# Patient Record
Sex: MALE | Race: WHITE | NOT HISPANIC OR LATINO | Employment: OTHER | URBAN - METROPOLITAN AREA
[De-identification: names, ages, dates, MRNs, and addresses within clinical notes are randomized per-mention and may not be internally consistent; named-entity substitution may affect disease eponyms.]

---

## 2017-09-08 PROBLEM — K21.9 GASTROESOPHAGEAL REFLUX DISEASE WITHOUT ESOPHAGITIS: Status: ACTIVE | Noted: 2017-09-08

## 2017-09-08 PROBLEM — M81.0 AGE-RELATED OSTEOPOROSIS WITHOUT CURRENT PATHOLOGICAL FRACTURE: Status: ACTIVE | Noted: 2017-09-08

## 2017-09-08 PROBLEM — H04.129 DRY EYE: Status: ACTIVE | Noted: 2017-09-08

## 2017-09-08 PROBLEM — E78.00 PURE HYPERCHOLESTEROLEMIA: Status: ACTIVE | Noted: 2017-09-08

## 2017-10-06 PROBLEM — R03.0 WHITE COAT SYNDROME WITHOUT DIAGNOSIS OF HYPERTENSION: Status: ACTIVE | Noted: 2017-10-06

## 2018-10-18 ENCOUNTER — APPOINTMENT (OUTPATIENT)
Dept: RADIOLOGY | Facility: HOSPITAL | Age: 68
End: 2018-10-18
Attending: FAMILY MEDICINE
Payer: MEDICARE

## 2018-10-18 DIAGNOSIS — M81.0 OSTEOPOROSIS, UNSPECIFIED OSTEOPOROSIS TYPE, UNSPECIFIED PATHOLOGICAL FRACTURE PRESENCE: ICD-10-CM

## 2018-10-18 PROCEDURE — 77080 DXA BONE DENSITY AXIAL: CPT | Mod: TC

## 2018-10-18 PROCEDURE — 77080 DXA BONE DENSITY AXIAL: CPT | Mod: 26,,, | Performed by: RADIOLOGY

## 2018-11-07 ENCOUNTER — OFFICE VISIT (OUTPATIENT)
Dept: RHEUMATOLOGY | Facility: CLINIC | Age: 68
End: 2018-11-07
Payer: MEDICARE

## 2018-11-07 VITALS — HEART RATE: 59 BPM | BODY MASS INDEX: 21.08 KG/M2 | HEIGHT: 74 IN | WEIGHT: 164.25 LBS

## 2018-11-07 DIAGNOSIS — M81.0 OSTEOPOROSIS, UNSPECIFIED OSTEOPOROSIS TYPE, UNSPECIFIED PATHOLOGICAL FRACTURE PRESENCE: Primary | ICD-10-CM

## 2018-11-07 PROCEDURE — 99999 PR PBB SHADOW E&M-EST. PATIENT-LVL III: CPT | Mod: PBBFAC,,, | Performed by: INTERNAL MEDICINE

## 2018-11-07 PROCEDURE — 99204 OFFICE O/P NEW MOD 45 MIN: CPT | Mod: S$GLB,,, | Performed by: INTERNAL MEDICINE

## 2018-11-07 NOTE — PATIENT INSTRUCTIONS
Osteoporosis: Understanding Bone Loss     A balanced system keeps building and resorbing bone.   The body has a natural system for maintaining bone. Understanding this system can help you learn how to maintain your bones.  A balanced system supports the body  The body is always losing (resorbing) and making bone. This process is called remodeling. Bone-resorbing cells take bone apart. They do this so the minerals can be used to repair an injury or make new bone. Bone-making cells form new bone using calcium and other minerals. These minerals come from the food you eat. When this bone-making system is in balance, the same amount of bone is built and resorbed.        An unbalanced system cant give support     An unblalanced system builds too little bone and resorbs too much.   Changes in hormone levels, activity, medications, or diet can affect the bone-making system. When the system gets out of balance, the amount of bone lost is greater than the amount of bone made. This can cause osteopenia (when bone starts to become less dense). Left untreated, bone loss gets worse, leading to osteoporosis. Weak bones cant support the body. In fact, they can fracture just from the weight of your body. This often happens in vertebrae (bones of the spine). When vertebrae fracture, parts of the spine compress. This causes the back to bend or hump over, and it can also cause back pain.  Date Last Reviewed: 10/11/2015  © 3701-7438 [employer]. [address] [place] [address]. All rights reserved. This information is not intended as a substitute for professional medical care. Always follow your healthcare professional's instructions.        Preventing Osteoporosis: Avoiding Bone Loss  Certain factors can speed up bone loss or decrease bone growth. For example, alcohol, cigarettes, and certain medicines reduce bone mass. Some foods make it hard for your body to absorb calcium.    Things to avoid  Here are  things to avoid to help prevent osteoporosis:  · Alcohol is toxic to bones. It is a major cause of bone loss. Heavy drinking can cause osteoporosis even if you have no other risk factors.  · Smoking reduces bone mass. Smoking may also interfere with estrogen levels and cause early menopause.  · Inactivity makes your bones lose strength and become thinner. Over time, thin bones may break. Women who aren't active are at a high risk for osteoporosis.  · Certain medicines, such as cortisone, increase bone loss. They also decrease bone growth. Ask your healthcare provider about any side effects of your medicines, and how to prevent them.  · Protein-rich or salty foods eaten in large amounts may deplete calcium.  · Caffeine increases calcium loss. People who drink a lot of coffee, tea, or valorie lose more calcium than those who don't.  Date Last Reviewed: 10/17/2015  © 9796-2181 [employer]. [address] [place] [address]. All rights reserved. This information is not intended as a substitute for professional medical care. Always follow your healthcare professional's instructions.        Preventing Osteoporosis: Meeting Your Calcium Needs    Your body needs calcium to build and repair bones. But it can't make calcium on its own. That's why it's important to eat calcium-rich foods. Some foods are naturally rich in calcium. Others have calcium added (fortified). It's best to get calcium from the foods you eat. But if you can't get enough, you may want to take calcium supplements. To meet your daily calcium needs, try the foods listed below.  Dairy Fish & beans Other sources      Source   Calcium (mg) per serving   Source   Calcium (mg) per serving   Source   Calcium (mg) per serving      Low-fat yogurt, plain   415 mg/8 oz.   Sardines, Atlantic, canned, with bones   351 mg/3 oz.   Oatmeal, instant, fortified   215 mg/1 cup   Nonfat milk   302 mg/1 cup   Buckner, sockeye, canned, with bones   239 mg/3  oz.   Tofu made with calcium sulfate   204 mg/3 oz.   Low-fat milk   297 mg/1 cup   Soybeans, fresh, boiled   131 mg/1/2 cup   Collards   179 mg/1/2 cup   Swiss cheese   272 mg/1 oz.   White beans, cooked   81 mg/1/2 cup   English muffin, whole wheat   175 mg/1 muffin   Cheddar cheese   205 mg/1 oz.   Navy beans, cooked   79 mg/1/2 cup   Kale   90 mg/1/2 cup   Ice cream strawberry   79 mg/1/2 cup           Orange, navel   56 mg/1 medium   Note: Calcium levels may vary depending on brand and size.  Daily calcium needs  14-18 years old: 1,300 mg  19-30 years old: 1,000 mg  31-50 years old: 1,000 mg  51-70 years old, women: 1,200 mg  51-70 years old, men: 1,000 mg  Pregnant or nursin-28 years old: 1,300 mg, 19-50 years old: 1,000 mg  Older than 70 (women and men): 1,200 mg   Date Last Reviewed: 10/17/2015  © 0450-6373 [employer]. [address] [place] [address]. All rights reserved. This information is not intended as a substitute for professional medical care. Always follow your healthcare professional's instructions.        Living with Osteoporosis: Preventing Fractures  If you have osteoporosis, you can do a lot to reduce its effect on your life. Knowing how to prevent fractures and spinal curvature can help you live more comfortably and safely with this disease.    Reducing your risk for fractures  The most common fracture sites in people with osteoporosis are the wrist, spine, and hip. These fractures are often caused by accidents and falls. All fractures are painful and may limit what you can do. But hip fractures are very serious. They need surgery, and it can take months to recover. To reduce your risk for fractures:  · Get regular exercise. Try walking, swimming, or weight training.  · Eat foods that are rich in calcium, or take calcium supplements.  · Make your home safe to help avoid accidents.  · Take extra precautions not to fall in risky areas, such as icy  sidewalks.  Understanding spinal fractures  Your spine is made up of many bones called vertebrae. Osteoporosis can cause the vertebrae in your spine to collapse. As a result, your upper back may arch forward, creating a curvature. Spine fractures may also result from back strain and bad posture. You will also lose height. Your lower spine must then adjust to keep your body balanced. This can cause back pain. To prevent or lessen these spinal changes:  · Practice good posture.  · Use proper techniques if you need to lift heavy objects.  · Do back exercises to help your posture.  · Lie on your back when you have pain.  · Ask your healthcare provider about these and other ways to help your spine.  Date Last Reviewed: 10/17/2015  © 6484-8324 [employer]. [address] [place] [address], [place] [address]. All rights reserved. This information is not intended as a substitute for professional medical care. Always follow your healthcare professional's instructions.        Living with Osteoporosis: Regular Exercise  If you have osteoporosis, exercise is vital for your health. It can prevent bone fractures and spine changes. It will slow bone loss. Exercise will strengthen your body. It can also be fun. A variety of exercises is best. See below for exercises that can help you. Before you start, though, talk to your healthcare provider to be sure these exercises are right for you.    Resistance exercises. These build muscle strength and maintain bone mass. They also make you less prone to injury. Exercises include lifting small weights, doing push-ups and sit-ups, using elastic exercise bands, and using weight machines.  Weight-bearing activities. These help your whole body. They also help you maintain bone mass. Activities include walking, dancing, and housework.  Nonweight-bearing exercises. These help prevent back strain and pain. They do this by building the trunk and leg muscles. Exercises that help with flexibility  can prevent falls. Examples include swimming, water exercise, and stretching.  Staying safe  Here are tips to stay safe:   · Always check with your healthcare provider before starting any new exercise program.  · Use weights only as instructed.  · Stop any exercise that causes pain.   Date Last Reviewed: 10/17/2015  © 8951-4419 [employer]. [address] [place] [address], [place] [address]. All rights reserved. This information is not intended as a substitute for professional medical care. Always follow your healthcare professional's instructions.        Denosumab injection  What is this medicine?  DENOSUMAB (den oh ayleen mab) slows bone breakdown. Prolia is used to treat osteoporosis in women after menopause and in men. Xgeva is used to prevent bone fractures and other bone problems caused by cancer bone metastases. Xgeva is also used to treat giant cell tumor of the bone.  How should I use this medicine?  This medicine is for injection under the skin. It is given by a health care professional in a hospital or clinic setting.  If you are getting Prolia, a special MedGuide will be given to you by the pharmacist with each prescription and refill. Be sure to read this information carefully each time.  For Prolia, talk to your pediatrician regarding the use of this medicine in children. Special care may be needed. For Xgeva, talk to your pediatrician regarding the use of this medicine in children. While this drug may be prescribed for children as young as 13 years for selected conditions, precautions do apply.  What side effects may I notice from receiving this medicine?  Side effects that you should report to your doctor or health care professional as soon as possible:  · allergic reactions like skin rash, itching or hives, swelling of the face, lips, or tongue  · breathing problems  · chest pain  · fast, irregular heartbeat  · feeling faint or lightheaded, falls  · fever, chills, or any other sign of  infection  · muscle spasms, tightening, or twitches  · numbness or tingling  · skin blisters or bumps, or is dry, peels, or red  · slow healing or unexplained pain in the mouth or jaw  · unusual bleeding or bruising  Side effects that usually do not require medical attention (Report these to your doctor or health care professional if they continue or are bothersome.):  · muscle pain  · stomach upset, gas  What may interact with this medicine?  Do not take this medicine with any of the following medications:  · other medicines containing denosumab  This medicine may also interact with the following medications:  · medicines that suppress the immune system  · medicines that treat cancer  · steroid medicines like prednisone or cortisone  What if I miss a dose?  It is important not to miss your dose. Call your doctor or health care professional if you are unable to keep an appointment.  Where should I keep my medicine?  This medicine is only given in a clinic, doctor's office, or other health care setting and will not be stored at home.  What should I tell my health care provider before I take this medicine?  They need to know if you have any of these conditions:  · dental disease  · eczema  · infection or history of infections  · kidney disease or on dialysis  · low blood calcium or vitamin D  · malabsorption syndrome  · scheduled to have surgery or tooth extraction  · taking medicine that contains denosumab  · thyroid or parathyroid disease  · an unusual reaction to denosumab, other medicines, foods, dyes, or preservatives  · pregnant or trying to get pregnant  · breast-feeding  What should I watch for while using this medicine?  Visit your doctor or health care professional for regular checks on your progress. Your doctor or health care professional may order blood tests and other tests to see how you are doing.  Call your doctor or health care professional if you get a cold or other infection while receiving this  medicine. Do not treat yourself. This medicine may decrease your body's ability to fight infection.  You should make sure you get enough calcium and vitamin D while you are taking this medicine, unless your doctor tells you not to. Discuss the foods you eat and the vitamins you take with your health care professional.  See your dentist regularly. Brush and floss your teeth as directed. Before you have any dental work done, tell your dentist you are receiving this medicine.  Do not become pregnant while taking this medicine or for 5 months after stopping it. Women should inform their doctor if they wish to become pregnant or think they might be pregnant. There is a potential for serious side effects to an unborn child. Talk to your health care professional or pharmacist for more information.  NOTE:This sheet is a summary. It may not cover all possible information. If you have questions about this medicine, talk to your doctor, pharmacist, or health care provider. Copyright© 2017 [employer]

## 2018-11-07 NOTE — LETTER
November 7, 2018      Leonardo Khan MD  [address]  [address]  [place] [address]           [facility] - Rheumatology  [address]  [place] [address]  Phone: [phone]  Fax: [phone]          Patient: Brett Nugent   MR Number: [id number]   YOB: 1950   Date of Visit: 11/7/2018       Dear Dr. Leonardo Khan:    Thank you for referring Brett Nugent to me for evaluation. Attached you will find relevant portions of my assessment and plan of care.    If you have questions, please do not hesitate to call me. I look forward to following Brett Nugent along with you.    Sincerely,    Nicholas Brunson MD    Enclosure  CC:  No Recipients    If you would like to receive this communication electronically, please contact [email or web address] or [phone] to request more information on [employer] access.    For providers and/or their staff who would like to refer a patient to [facility], please contact us through our one-stop-shop provider referral line, [facility] , at [phone].    If you feel you have received this communication in error or would no longer like to receive these types of communications, please e-mail [email or web address]

## 2018-11-07 NOTE — PROGRESS NOTES
RHEUMATOLOGY OUTPATIENT CLINIC NOTE    11/7/2018    Attending Rheumatologist: Nicholas Brunson  Primary Care Provider: Leonardo Khan MD   Physician Requesting Consultation: Leonardo Khan MD  [address]  [address]  [place] [address]  Chief Complaint/Reason For Consultation:  Osteoporosis      Subjective:       HPI  Brett Nugent is a 68 y.o. White male with osteoporosis refer for therapeutic recommendations.  Patient was noted to have osteoporosis during general health screening DEXA back in 2014.  Endorses being on by antiresorptive therapy with Fosamax ever since.  Patient was noted to have a statistically significant decrease in his T scores on on follow-up DXA study done on October 2018.  Voices no acute complaints today.  Denies any episodes of fragility fracture, frequent falls, or back pain with alarm features.  Currently not planned for any dental procedures.    Review of Systems   Constitutional: Negative for fever and weight loss.   HENT:        Denies jaw pain.   Respiratory: Negative for cough and shortness of breath.    Cardiovascular: Negative for chest pain and leg swelling.   Gastrointestinal: Negative for abdominal pain and blood in stool.   Genitourinary: Negative for dysuria and urgency.   Musculoskeletal: Negative for falls and joint pain.   Skin: Negative for rash.   Neurological: Negative for tingling, focal weakness and weakness.   Psychiatric/Behavioral: Negative for substance abuse.       Chronic comorbid conditions affecting medical decision making today:  Past Medical History:   Diagnosis Date    GERD (gastroesophageal reflux disease)     Hyperlipidemia      Past Surgical History:   Procedure Laterality Date    PROSTATE SURGERY      SINUS SURGERY       History reviewed. No pertinent family history.  Social History     Substance and Sexual Activity   Alcohol Use No     Social History     Tobacco Use   Smoking Status Never Smoker   Smokeless Tobacco Never Used     Social  History     Substance and Sexual Activity   Drug Use No       Current Outpatient Medications:     aspirin (ECOTRIN) 81 MG EC tablet, Take 81 mg by mouth once daily., Disp: , Rfl:     cycloSPORINE (RESTASIS) 0.05 % ophthalmic emulsion, Place 1 drop into both eyes 2 (two) times daily., Disp: , Rfl:     esomeprazole (NEXIUM) 40 MG capsule, Take 1 capsule (40 mg total) by mouth once daily., Disp: 30 capsule, Rfl: 11    ORTHO DF 3,775 unit- 1 mg Cap, TAKE ONE CAPSULE BY MOUTH TWICE DAILY, Disp: 60 capsule, Rfl: 5    simvastatin (ZOCOR) 40 MG tablet, Take 1 tablet (40 mg total) by mouth every evening., Disp: 30 tablet, Rfl: 11    Current Facility-Administered Medications:     denosumab (PROLIA) injection 60 mg, 60 mg, Subcutaneous, Q6 Months, Nicholas Brunson MD       Objective:         Vitals:    11/07/18 1313   Pulse: (!) 59     Physical Exam   Nursing note and vitals reviewed.  Constitutional: He is oriented to person, place, and time and well-developed, well-nourished, and in no distress. No distress.   HENT:   Head: Normocephalic.   Eyes: Conjunctivae are normal. Pupils are equal, round, and reactive to light.   Neck: Normal range of motion.   Cardiovascular: Normal rate.    Pulmonary/Chest: Effort normal. No respiratory distress.   Abdominal: Soft. He exhibits no distension.   Neurological: He is alert and oriented to person, place, and time. Gait normal.   Skin: No rash noted.     Musculoskeletal: He exhibits no edema, tenderness or deformity.   Step up and go test less than 12 sec.       Reviewed old and all outside pertinent medical records available.    All lab results personally reviewed and interpreted by me.  Lab Results   Component Value Date    WBC 6.3 10/12/2018    HGB 14.4 10/12/2018    HCT 45.0 10/12/2018    MCV 94 10/12/2018    MCH 30.0 10/12/2018    MCHC 32.0 10/12/2018    RDW 14.2 10/12/2018     10/12/2018    MPV 9.6 05/26/2006    NEUTROABS 4.2 10/12/2018       Lab Results   Component Value  Date     10/12/2018    K 4.5 10/12/2018     10/12/2018    CO2 26 10/12/2018    GLU 71 10/12/2018    BUN 26 10/12/2018    CALCIUM 9.4 10/12/2018    PROT 6.6 10/12/2018    ALBUMIN 4.2 10/12/2018    BILITOT 0.3 10/12/2018    AST 12 10/12/2018    ALKPHOS 46 10/12/2018    ALT 13 10/12/2018       No results found for: COLORU, APPEARANCEUA, SPECGRAV, PHUR, PROTEINUA, GLUCOSEU, KETONESU, BLOODU, LEUKOCYTESUR, NITRITE, UROBILINOGEN    No results found for: CRP    No results found for: SEDRATE, ERYTHROCYTES    No results found for: JAYLIN, RF, SEDRATE    No components found for: 25OHVITDTOT, 93XQXZYA3, 38FYYPUX8, METHODNOTE    No results found for: URICACID    No components found for: TSPOTTB    Rheum Labs:  n/a     Infectious Labs:  HCV: NR     Imaging:  All imaging reviewed and independently  interpreted by me.    DEXA scan  April 2014 October 2018  FN: -2.4 -2.6  LS: -1.6 -4.5     ASSESSMENT / PLAN:     Brett Nugent is a 68 y.o. White male with:      1. Osteoporosis, unspecified osteoporosis type, unspecified pathological fracture presence  - At high risk of fragility fracture.  - reports being on antiresorptive medications Fosamax for several years.  - recommend to continue with alternate antiresorptive medication with Prolia q/6months  - clinical significance side effects of therapy discussed in detail.  - continue with calcium vitamin-D supplementation  - Comprehensive metabolic panel; Future  - Prior Authorization Order    2. Other specified counseling  - Limitation of alcohol consumption.  - Regular exercise:  Aerobic and resistance.  - Medication counseling provided.   - Discussed and verbalized understanding concerning the adverse effects of therapy particularly risk of jaw osteonecrosis   - Avoid immobility, fall prevention        Follow-up in about 6 months (around 5/7/2019).    Method of contact with patient concerns: [facility]      Nicholas Brunson M.D.  Rheumatology Department    [facility] [place] [address], [place] [address]  Phone: [phone]  Fax: [phone]

## 2018-11-28 ENCOUNTER — LAB VISIT (OUTPATIENT)
Dept: LAB | Facility: HOSPITAL | Age: 68
End: 2018-11-28
Attending: INTERNAL MEDICINE
Payer: MEDICARE

## 2018-11-28 ENCOUNTER — CLINICAL SUPPORT (OUTPATIENT)
Dept: RHEUMATOLOGY | Facility: CLINIC | Age: 68
End: 2018-11-28
Payer: MEDICARE

## 2018-11-28 DIAGNOSIS — M81.0 AGE-RELATED OSTEOPOROSIS WITHOUT CURRENT PATHOLOGICAL FRACTURE: Primary | ICD-10-CM

## 2018-11-28 DIAGNOSIS — M81.0 OSTEOPOROSIS, UNSPECIFIED OSTEOPOROSIS TYPE, UNSPECIFIED PATHOLOGICAL FRACTURE PRESENCE: ICD-10-CM

## 2018-11-28 LAB
ALBUMIN SERPL BCP-MCNC: 3.7 G/DL
ALP SERPL-CCNC: 45 U/L
ALT SERPL W/O P-5'-P-CCNC: 31 U/L
ANION GAP SERPL CALC-SCNC: 9 MMOL/L
AST SERPL-CCNC: 23 U/L
BILIRUB SERPL-MCNC: 0.5 MG/DL
BUN SERPL-MCNC: 27 MG/DL
CALCIUM SERPL-MCNC: 9.5 MG/DL
CHLORIDE SERPL-SCNC: 108 MMOL/L
CO2 SERPL-SCNC: 27 MMOL/L
CREAT SERPL-MCNC: 1 MG/DL
EST. GFR  (AFRICAN AMERICAN): >60 ML/MIN/1.73 M^2
EST. GFR  (NON AFRICAN AMERICAN): >60 ML/MIN/1.73 M^2
GLUCOSE SERPL-MCNC: 63 MG/DL
POTASSIUM SERPL-SCNC: 3.8 MMOL/L
PROT SERPL-MCNC: 6.8 G/DL
SODIUM SERPL-SCNC: 144 MMOL/L

## 2018-11-28 PROCEDURE — 99999 PR PBB SHADOW E&M-EST. PATIENT-LVL I: CPT | Mod: PBBFAC,,,

## 2018-11-28 PROCEDURE — 96372 THER/PROPH/DIAG INJ SC/IM: CPT | Mod: S$GLB,,, | Performed by: INTERNAL MEDICINE

## 2018-11-28 PROCEDURE — 80053 COMPREHEN METABOLIC PANEL: CPT | Mod: PO

## 2018-11-28 PROCEDURE — 36415 COLL VENOUS BLD VENIPUNCTURE: CPT | Mod: PO

## 2018-11-28 NOTE — PROGRESS NOTES
Administered 1 cc Prolia 60mg/cc  to LLQ of abdomen. Pt tolerated well. No acute reaction noted to site. Pt instructed on S/S to report. Advised patient to wait in lobby 15 minutes after receiving injection to monitor for any reactions. Pt verbalized understanding.       Calcium: 9.5  Creatinine: 1.0  Lot: [id number]  Exp: 01/21

## 2019-01-28 PROBLEM — H55.00 NYSTAGMUS: Status: ACTIVE | Noted: 2019-01-28

## 2019-01-29 ENCOUNTER — HOSPITAL ENCOUNTER (OUTPATIENT)
Dept: RADIOLOGY | Facility: HOSPITAL | Age: 69
Discharge: HOME | End: 2019-01-29
Attending: FAMILY MEDICINE
Payer: MEDICARE

## 2019-01-29 DIAGNOSIS — R29.818 FOCAL NEUROLOGICAL DEFICIT: ICD-10-CM

## 2019-01-29 PROCEDURE — 70450 CT HEAD/BRAIN W/O DYE: CPT | Mod: 26,,, | Performed by: RADIOLOGY

## 2019-01-29 PROCEDURE — 70450 CT HEAD/BRAIN W/O DYE: CPT | Mod: TC

## 2019-01-29 PROCEDURE — 70450 CT HEAD WITHOUT CONTRAST: ICD-10-PCS | Mod: 26,,, | Performed by: RADIOLOGY

## 2019-02-07 ENCOUNTER — PATIENT MESSAGE (OUTPATIENT)
Dept: NEUROLOGY | Facility: CLINIC | Age: 69
End: 2019-02-07

## 2019-02-20 ENCOUNTER — HOSPITAL ENCOUNTER (OUTPATIENT)
Facility: HOSPITAL | Age: 69
Discharge: HOME HEALTH | End: 2019-02-22
Attending: EMERGENCY MEDICINE | Admitting: INTERNAL MEDICINE
Payer: MEDICARE

## 2019-02-20 DIAGNOSIS — M50.30 DEGENERATIVE DISC DISEASE, CERVICAL: Primary | ICD-10-CM

## 2019-02-20 DIAGNOSIS — R53.1 WEAKNESS: ICD-10-CM

## 2019-02-20 DIAGNOSIS — G45.9 TIA (TRANSIENT ISCHEMIC ATTACK): ICD-10-CM

## 2019-02-20 DIAGNOSIS — R53.1 RIGHT SIDED WEAKNESS: ICD-10-CM

## 2019-02-20 PROBLEM — L03.012 PARONYCHIA OF FINGER, LEFT: Status: ACTIVE | Noted: 2019-02-20

## 2019-02-20 PROBLEM — M54.50 LEFT LOW BACK PAIN: Status: ACTIVE | Noted: 2019-02-20

## 2019-02-20 LAB
ALBUMIN SERPL BCP-MCNC: 3.8 G/DL
ALP SERPL-CCNC: 51 U/L
ALT SERPL W/O P-5'-P-CCNC: 12 U/L
ANION GAP SERPL CALC-SCNC: 11 MMOL/L
APTT BLDCRRT: 26.2 SEC
AST SERPL-CCNC: 17 U/L
BASOPHILS # BLD AUTO: 0.02 K/UL
BASOPHILS NFR BLD: 0.2 %
BILIRUB SERPL-MCNC: 0.6 MG/DL
BILIRUB UR QL STRIP: NEGATIVE
BUN SERPL-MCNC: 29 MG/DL
CALCIUM SERPL-MCNC: 9.9 MG/DL
CHLORIDE SERPL-SCNC: 106 MMOL/L
CHOLEST SERPL-MCNC: 183 MG/DL
CHOLEST/HDLC SERPL: 2.7 {RATIO}
CLARITY UR: CLEAR
CO2 SERPL-SCNC: 27 MMOL/L
COLOR UR: YELLOW
CREAT SERPL-MCNC: 0.8 MG/DL
DIASTOLIC DYSFUNCTION: NO
DIFFERENTIAL METHOD: ABNORMAL
EOSINOPHIL # BLD AUTO: 0.1 K/UL
EOSINOPHIL NFR BLD: 0.9 %
ERYTHROCYTE [DISTWIDTH] IN BLOOD BY AUTOMATED COUNT: 14.3 %
EST. GFR  (AFRICAN AMERICAN): >60 ML/MIN/1.73 M^2
EST. GFR  (NON AFRICAN AMERICAN): >60 ML/MIN/1.73 M^2
ESTIMATED PA SYSTOLIC PRESSURE: 32.38
GLUCOSE SERPL-MCNC: 86 MG/DL
GLUCOSE UR QL STRIP: NEGATIVE
HCT VFR BLD AUTO: 45.6 %
HDLC SERPL-MCNC: 69 MG/DL
HDLC SERPL: 37.7 %
HGB BLD-MCNC: 14.8 G/DL
HGB UR QL STRIP: NEGATIVE
INR PPP: 1
KETONES UR QL STRIP: NEGATIVE
LDLC SERPL CALC-MCNC: 95.8 MG/DL
LEUKOCYTE ESTERASE UR QL STRIP: NEGATIVE
LYMPHOCYTES # BLD AUTO: 1.5 K/UL
LYMPHOCYTES NFR BLD: 13.6 %
MCH RBC QN AUTO: 29.7 PG
MCHC RBC AUTO-ENTMCNC: 32.5 G/DL
MCV RBC AUTO: 91 FL
MONOCYTES # BLD AUTO: 0.6 K/UL
MONOCYTES NFR BLD: 5.5 %
NEUTROPHILS # BLD AUTO: 8.6 K/UL
NEUTROPHILS NFR BLD: 79.8 %
NITRITE UR QL STRIP: NEGATIVE
NONHDLC SERPL-MCNC: 114 MG/DL
PH UR STRIP: 7 [PH] (ref 5–8)
PLATELET # BLD AUTO: 182 K/UL
PMV BLD AUTO: 9.4 FL
POTASSIUM SERPL-SCNC: 4.3 MMOL/L
PROT SERPL-MCNC: 7.1 G/DL
PROT UR QL STRIP: NEGATIVE
PROTHROMBIN TIME: 10.5 SEC
RBC # BLD AUTO: 4.99 M/UL
RETIRED EF AND QEF - SEE NOTES: 60 (ref 55–65)
SODIUM SERPL-SCNC: 144 MMOL/L
SP GR UR STRIP: 1.01 (ref 1–1.03)
TRIGL SERPL-MCNC: 91 MG/DL
TROPONIN I SERPL DL<=0.01 NG/ML-MCNC: <0.006 NG/ML
URN SPEC COLLECT METH UR: NORMAL
UROBILINOGEN UR STRIP-ACNC: NEGATIVE EU/DL
WBC # BLD AUTO: 10.77 K/UL

## 2019-02-20 PROCEDURE — 63600175 PHARM REV CODE 636 W HCPCS: Performed by: PHYSICIAN ASSISTANT

## 2019-02-20 PROCEDURE — 96372 THER/PROPH/DIAG INJ SC/IM: CPT | Mod: 59 | Performed by: EMERGENCY MEDICINE

## 2019-02-20 PROCEDURE — 93306 TTE W/DOPPLER COMPLETE: CPT

## 2019-02-20 PROCEDURE — 93005 ELECTROCARDIOGRAM TRACING: CPT

## 2019-02-20 PROCEDURE — 99285 EMERGENCY DEPT VISIT HI MDM: CPT | Mod: 25

## 2019-02-20 PROCEDURE — 86803 HEPATITIS C AB TEST: CPT

## 2019-02-20 PROCEDURE — 85025 COMPLETE CBC W/AUTO DIFF WBC: CPT

## 2019-02-20 PROCEDURE — 80053 COMPREHEN METABOLIC PANEL: CPT

## 2019-02-20 PROCEDURE — 97162 PT EVAL MOD COMPLEX 30 MIN: CPT | Performed by: PHYSICAL THERAPIST

## 2019-02-20 PROCEDURE — 85610 PROTHROMBIN TIME: CPT

## 2019-02-20 PROCEDURE — 85730 THROMBOPLASTIN TIME PARTIAL: CPT

## 2019-02-20 PROCEDURE — 80061 LIPID PANEL: CPT

## 2019-02-20 PROCEDURE — G0378 HOSPITAL OBSERVATION PER HR: HCPCS

## 2019-02-20 PROCEDURE — 93306 TTE W/DOPPLER COMPLETE: CPT | Mod: 26,,, | Performed by: INTERNAL MEDICINE

## 2019-02-20 PROCEDURE — 36415 COLL VENOUS BLD VENIPUNCTURE: CPT

## 2019-02-20 PROCEDURE — 81003 URINALYSIS AUTO W/O SCOPE: CPT

## 2019-02-20 PROCEDURE — 97166 OT EVAL MOD COMPLEX 45 MIN: CPT | Performed by: PHYSICAL THERAPIST

## 2019-02-20 PROCEDURE — 84484 ASSAY OF TROPONIN QUANT: CPT

## 2019-02-20 PROCEDURE — 93010 EKG 12-LEAD: ICD-10-PCS | Mod: ,,, | Performed by: INTERNAL MEDICINE

## 2019-02-20 PROCEDURE — 93010 ELECTROCARDIOGRAM REPORT: CPT | Mod: ,,, | Performed by: INTERNAL MEDICINE

## 2019-02-20 PROCEDURE — 93306 2D ECHO WITH COLOR FLOW DOPPLER: ICD-10-PCS | Mod: 26,,, | Performed by: INTERNAL MEDICINE

## 2019-02-20 PROCEDURE — 25000003 PHARM REV CODE 250: Performed by: PHYSICIAN ASSISTANT

## 2019-02-20 PROCEDURE — 25000003 PHARM REV CODE 250: Performed by: EMERGENCY MEDICINE

## 2019-02-20 PROCEDURE — 96374 THER/PROPH/DIAG INJ IV PUSH: CPT | Mod: 59 | Performed by: EMERGENCY MEDICINE

## 2019-02-20 RX ORDER — ALPRAZOLAM 0.25 MG/1
0.25 TABLET ORAL
Status: DISCONTINUED | OUTPATIENT
Start: 2019-02-20 | End: 2019-02-22 | Stop reason: HOSPADM

## 2019-02-20 RX ORDER — SIMVASTATIN 20 MG/1
40 TABLET, FILM COATED ORAL NIGHTLY
Status: DISCONTINUED | OUTPATIENT
Start: 2019-02-21 | End: 2019-02-22 | Stop reason: HOSPADM

## 2019-02-20 RX ORDER — HYDROCODONE BITARTRATE AND ACETAMINOPHEN 5; 325 MG/1; MG/1
1 TABLET ORAL EVERY 6 HOURS PRN
Status: DISCONTINUED | OUTPATIENT
Start: 2019-02-20 | End: 2019-02-22 | Stop reason: HOSPADM

## 2019-02-20 RX ORDER — ASPIRIN 81 MG/1
81 TABLET ORAL DAILY
Status: DISCONTINUED | OUTPATIENT
Start: 2019-02-21 | End: 2019-02-22 | Stop reason: HOSPADM

## 2019-02-20 RX ORDER — ASPIRIN 81 MG/1
81 TABLET ORAL DAILY
Status: DISCONTINUED | OUTPATIENT
Start: 2019-02-20 | End: 2019-02-20

## 2019-02-20 RX ORDER — ASPIRIN 325 MG
325 TABLET ORAL DAILY
Status: DISCONTINUED | OUTPATIENT
Start: 2019-02-20 | End: 2019-02-20

## 2019-02-20 RX ORDER — SODIUM CHLORIDE 0.9 % (FLUSH) 0.9 %
5 SYRINGE (ML) INJECTION
Status: DISCONTINUED | OUTPATIENT
Start: 2019-02-20 | End: 2019-02-22 | Stop reason: HOSPADM

## 2019-02-20 RX ORDER — ONDANSETRON 8 MG/1
8 TABLET, ORALLY DISINTEGRATING ORAL EVERY 8 HOURS PRN
Status: DISCONTINUED | OUTPATIENT
Start: 2019-02-20 | End: 2019-02-22 | Stop reason: HOSPADM

## 2019-02-20 RX ORDER — PANTOPRAZOLE SODIUM 40 MG/1
40 TABLET, DELAYED RELEASE ORAL DAILY
Status: DISCONTINUED | OUTPATIENT
Start: 2019-02-20 | End: 2019-02-20

## 2019-02-20 RX ORDER — CEPHALEXIN 500 MG/1
500 CAPSULE ORAL EVERY 8 HOURS
Status: DISCONTINUED | OUTPATIENT
Start: 2019-02-20 | End: 2019-02-22 | Stop reason: HOSPADM

## 2019-02-20 RX ORDER — ENOXAPARIN SODIUM 100 MG/ML
40 INJECTION SUBCUTANEOUS EVERY 24 HOURS
Status: DISCONTINUED | OUTPATIENT
Start: 2019-02-20 | End: 2019-02-22 | Stop reason: HOSPADM

## 2019-02-20 RX ORDER — ACETAMINOPHEN 325 MG/1
650 TABLET ORAL EVERY 6 HOURS PRN
Status: DISCONTINUED | OUTPATIENT
Start: 2019-02-20 | End: 2019-02-22 | Stop reason: HOSPADM

## 2019-02-20 RX ORDER — PANTOPRAZOLE SODIUM 40 MG/1
40 TABLET, DELAYED RELEASE ORAL DAILY
Status: DISCONTINUED | OUTPATIENT
Start: 2019-02-21 | End: 2019-02-22 | Stop reason: HOSPADM

## 2019-02-20 RX ADMIN — CEPHALEXIN 500 MG: 500 CAPSULE ORAL at 03:02

## 2019-02-20 RX ADMIN — ASPIRIN 325 MG ORAL TABLET 325 MG: 325 PILL ORAL at 11:02

## 2019-02-20 RX ADMIN — LORAZEPAM 0.25 MG: 2 INJECTION INTRAMUSCULAR; INTRAVENOUS at 05:02

## 2019-02-20 RX ADMIN — ENOXAPARIN SODIUM 40 MG: 100 INJECTION SUBCUTANEOUS at 04:02

## 2019-02-20 RX ADMIN — CEPHALEXIN 500 MG: 500 CAPSULE ORAL at 09:02

## 2019-02-20 NOTE — PT/OT/SLP EVAL
Physical Therapy Evaluation    Patient Name:  Brett Nugent   MRN:  [id number]    Recommendations:     Discharge Recommendations:  other (see comments)(Home Health)vs Outpatient   Discharge Equipment Recommendations: walker, rolling   Barriers to discharge: None    Assessment:     Brett Nugent is a 68 y.o. male admitted with a medical diagnosis of Right sided weakness.  He presents with the following impairments/functional limitations:  weakness, impaired endurance, gait instability, impaired functional mobilty, impaired self care skills, impaired balance, decreased lower extremity function, decreased upper extremity function, decreased coordination, decreased safety awareness, pain.    Rehab Prognosis: Good; patient would benefit from acute skilled PT services to address these deficits and reach maximum level of function.    Recent Surgery: * No surgery found *      Plan:     During this hospitalization, patient to be seen 5 x/week(Pt to be seen a minimum of 5 out of 7 days a week for skilled PT) to address the identified rehab impairments via gait training, therapeutic activities, therapeutic exercises and progress toward the following goals:    · Plan of Care Expires:  02/27/19    Subjective     Chief Complaint: (R) side weakness  Patient/Family Comments/goals: to be as independent as possible  Pain/Comfort:  · Pain Rating 1: 3/10  · Location - Side 1: Bilateral  · Location 1: back  · Pain Addressed 1: Reposition, Distraction  · Pain Rating Post-Intervention 1: 3/10    Patients cultural, spiritual, Methodist conflicts given the current situation: no    Living Environment:  Lives with wife in 1 story house with no steps.  Prior to admission, patients level of function was (I) with ADLs and has started to ambulate with SPC for the past 3 weeks. Still drives and substitute teaches.  Equipment used at home: cane, straight.  DME owned (not currently used): none.  Upon discharge, patient will have assistance from  wife.    Objective:     Communicated with Nurse Galicia and [facility] chart review prior to session.  Patient found supine in bed telemetry, pulse ox (continuous), peripheral IV, blood pressure cuff  upon PT entry to room.    General Precautions: Standard, fall   Orthopedic Precautions:N/A   Braces: N/A     Exams:  · Cognitive Exam:  Patient is oriented to Person, Place, Time and Situation  · Postural Exam:  Patient presented with the following abnormalities:    · -       Rounded shoulders  · -       Forward head  · Sensation:    · -       Impaired  numbness/ tingling in (R) LE   · RLE ROM: WFL  · RLE Strength: 3+/5 grossly  · LLE ROM: WNL  · LLE Strength: WNL    Functional Mobility:  · Bed Mobility:     · Rolling Left:  stand by assistance  · Rolling Right: stand by assistance  · Scooting: stand by assistance  · Supine to Sit: minimum assistance  · Transfers:     · Sit to Stand:  minimum assistance with rolling walker  · Bed to Chair: minimum assistance with  rolling walker  using  Step Transfer  · Gait: ~50 ft using RW with Min A  · Balance: Fair- dynamic balance     PT Eval completed as listed above. Pt and wife educated in role of PT and POC.     AM-PAC 6 CLICK MOBILITY  Total Score:16     Patient left sitting on side of bed set up to eat lunch with all lines intact, call button in reach, Nurse Frida notified and wife present.    GOALS:   Multidisciplinary Problems     Physical Therapy Goals        Problem: Physical Therapy Goal    Goal Priority Disciplines Outcome Goal Variances Interventions   Physical Therapy Goal     PT, PT/OT      Description:  LTGs to be met by 2/27/19  1. Pt will perform bed mobility with Mod I  2. Pt will perform sit<>stand t/fs with Supervision  3. Pt will ambulate 200ft with/ without AD with Supervision  4. Pt will demo Good dynamic balance during ambulation                    History:     Past Medical History:   Diagnosis Date    GERD (gastroesophageal reflux disease)     Hyperlipidemia      Osteoporosis        Past Surgical History:   Procedure Laterality Date    PROSTATE SURGERY      SINUS SURGERY         Time Tracking:     PT Received On: 02/20/19  PT Start Time: 1145     PT Stop Time: 1200  PT Total Time (min): 15 min     Billable Minutes: Evaluation 15 min      Anabela Pedraza, PT/OT  02/20/2019

## 2019-02-20 NOTE — ASSESSMENT & PLAN NOTE
-Based on clinical course, unlikely related to an acute neurologic event. However, will check MRI to rule out.   -Risk stratify with carotid US, 2D echocardiogram and lipid panel.   -Continue ASA and statin.   -Neurochecks.   -PT and OT evaluations.

## 2019-02-20 NOTE — H&P
[facility] Hospital Medicine  History & Physical    Patient Name: Brett Nugent  MRN: [id number]  Admission Date: 2/20/2019  Attending Physician: Rodger Ellington MD   Primary Care Provider: Leonardo Khan MD         Patient information was obtained from patient, past medical records and ER records.     Subjective:     Principal Problem:Right sided weakness    Chief Complaint:   Chief Complaint   Patient presents with    Extremity Weakness     fall 1 month ago & last night- +weakness +back pain w/ numbness to BLE        HPI: Brett Nugent is a 68 year old male with hyperlipidemia who presented to the ED after a fall at home last night. The patient has multiple complaints including right sided weakness, right sided numbness and decreased right upper extremity fine motor skills that began approximately 1 month ago. In the last two weeks, the patient also notes left lumbar spine pain with associated left lower extremity pain. Additionally, he reports several months of balance difficulties. Despite all of these complaints, he has only experienced 3 falls within the last month. He denies confusion, changes in speech, difficulty swallowing as well as bowel and bladder incontinence. Code status was discussed with the patient and his wife. He is a DNR. His wife, Laura Nugent, is his surrogate medical decision maker.     Past Medical History:   Diagnosis Date    GERD (gastroesophageal reflux disease)     Hyperlipidemia     Osteoporosis        Past Surgical History:   Procedure Laterality Date    PROSTATE SURGERY      SINUS SURGERY         Review of patient's allergies indicates:   Allergen Reactions    Tetanus vaccines and toxoid Other (See Comments)     Always told he had a reaction to it       Current Facility-Administered Medications on File Prior to Encounter   Medication    denosumab (PROLIA) injection 60 mg     Current Outpatient Medications on File Prior to Encounter   Medication Sig     aspirin (ECOTRIN) 81 MG EC tablet Take 81 mg by mouth once daily.    cephALEXin (KEFLEX) 500 MG capsule Take 1 capsule (500 mg total) by mouth every 8 (eight) hours. for 10 days    cycloSPORINE (RESTASIS) 0.05 % ophthalmic emulsion Place 1 drop into both eyes 2 (two) times daily.    esomeprazole (NEXIUM) 40 MG capsule Take 1 capsule (40 mg total) by mouth once daily.    ORTHO DF 3,775 unit- 1 mg Cap TAKE ONE CAPSULE BY MOUTH TWICE DAILY    simvastatin (ZOCOR) 40 MG tablet Take 1 tablet (40 mg total) by mouth every evening.     Family History     None        Tobacco Use    Smoking status: Never Smoker    Smokeless tobacco: Never Used   Substance and Sexual Activity    Alcohol use: No    Drug use: No    Sexual activity: Yes     Partners: Female     Review of Systems   Constitutional: Negative for appetite change, chills, diaphoresis, fatigue and fever.   HENT: Negative for congestion, ear pain, mouth sores, sore throat and trouble swallowing.    Eyes: Negative for pain and visual disturbance.   Respiratory: Negative for cough, chest tightness and shortness of breath.    Cardiovascular: Negative for chest pain, palpitations and leg swelling.   Gastrointestinal: Negative for abdominal pain, constipation, diarrhea and nausea.   Endocrine: Negative for cold intolerance, heat intolerance, polydipsia and polyuria.   Genitourinary: Negative for dysuria, frequency and hematuria.   Musculoskeletal: Positive for back pain (lower left). Negative for arthralgias, myalgias and neck pain.   Skin: Negative for pallor, rash and wound.   Allergic/Immunologic: Negative for environmental allergies and immunocompromised state.   Neurological: Positive for weakness (right upper and lower extremity) and numbness. Negative for dizziness, seizures, syncope and headaches.        Positive for falls and changes in equilibrium.    Hematological: Negative for adenopathy. Does not bruise/bleed easily.   Psychiatric/Behavioral: Negative  for agitation, confusion and sleep disturbance.     Objective:     Vital Signs (Most Recent):  Temp: 98.7 °F (37.1 °C) (02/20/19 0905)  Pulse: 82 (02/20/19 1206)  Resp: 20 (02/20/19 1206)  BP: (!) 142/81 (02/20/19 1206)  SpO2: 97 % (02/20/19 1206) Vital Signs (24h Range):  Temp:  [98.7 °F (37.1 °C)] 98.7 °F (37.1 °C)  Pulse:  [61-82] 82  Resp:  [14-20] 20  SpO2:  [96 %-98 %] 97 %  BP: (142-172)/(81-96) 142/81     Weight: 78.3 kg (172 lb 9.9 oz)  Body mass index is 22.16 kg/m².    Physical Exam   Constitutional: He is oriented to person, place, and time. He appears well-developed and well-nourished.   HENT:   Head: Normocephalic and atraumatic.   Eyes: Conjunctivae are normal.   Neck: Neck supple. No JVD present.   Cardiovascular: Normal rate, regular rhythm and normal heart sounds.   Pulmonary/Chest: Effort normal and breath sounds normal. He has no wheezes.   Abdominal: Soft. Bowel sounds are normal. He exhibits no distension. There is no tenderness.   Musculoskeletal: Normal range of motion.   Left 2nd distal finger deformity consistent with paronychia.    Neurological: He is alert and oriented to person, place, and time.   Slight decreased strength right upper extremity.    Skin: Skin is warm and dry. No rash noted.   Psychiatric: He has a normal mood and affect. His behavior is normal. Thought content normal.   Nursing note and vitals reviewed.          Significant Labs:   CBC:   Recent Labs   Lab 02/20/19  0934   WBC 10.77   HGB 14.8   HCT 45.6        CMP:   Recent Labs   Lab 02/20/19  0934      K 4.3      CO2 27   GLU 86   BUN 29*   CREATININE 0.8   CALCIUM 9.9   PROT 7.1   ALBUMIN 3.8   BILITOT 0.6   ALKPHOS 51*   AST 17   ALT 12   ANIONGAP 11   EGFRNONAA >60     All pertinent labs within the past 24 hours have been reviewed.    Significant Imaging: I have reviewed all pertinent imaging results/findings within the past 24 hours.   Imaging Results          US Carotid Bilateral (Final result)   Result time 02/20/19 13:05:14    Final result by Brett Whalen MD (02/20/19 13:05:14)                 Impression:      No significant stenosis.    **Categories of stenosis (0-15%, 16-49%, 50-69% and 70-99% ) are based on published criteria that have been internally validated.  Degree of stenosis is based on NASCET criteria and refers to the degree of luminal diameter narrowing as a percentage of the normal ICA distal to the stenosis and any area of post stenotic dilation.      Electronically signed by: Brett hWalen MD  Date:    02/20/2019  Time:    13:05             Narrative:    EXAMINATION:  US CAROTID BILATERAL    CLINICAL HISTORY:  right sided weakness;    COMPARISON:  None    FINDINGS:  Right common carotid:    Peak systolic velocity 84 cm/sec.    Right internal carotid artery: Mild plaque is seen in the bulb    Peak systolic velocity: 81 cm/sec.    IC/CC ratio:  0.96.    Left common carotid:    Peak systolic velocity 113 cm/sec.    Left internal carotid artery: Mild plaque is seen in the bulb    Peak systolic velocity 102 cm/sec.    IC/CC ratio 0.9.    Both vertebral arteries demonstrate antegrade flow.                               CT Head Without Contrast (Final result)  Result time 02/20/19 09:57:49    Final result by PAOLA Lewis Sr., MD (02/20/19 09:57:49)                 Impression:      1. There is no evidence of an acute ischemic event.  2. There is no intracranial hemorrhage.  3. The visualized portion of the paranasal sinuses is clear.  There are findings characteristic of a prior right maxillary antrostomy.  4. There is mild partial opacification of the right mastoid air cells.  All CT scans at this facility use dose modulation, iterative reconstruction, and/or weight base dosing when appropriate to reduce radiation dose when appropriate to reduce radiation dose to as low as reasonably achievable.      Electronically signed by: Domenic Lewis MD  Date:    02/20/2019  Time:    09:57              Narrative:    EXAMINATION:  CT HEAD WITHOUT CONTRAST    CLINICAL HISTORY:  Focal neuro deficit, new, fixed or worsening, >6 hours;    TECHNIQUE:  Standard brain CT protocol without IV contrast was performed.    COMPARISON:  01/29/2019    FINDINGS:  There is no evidence of an acute ischemic event.  There is no intracranial hemorrhage.  There is no skull fracture.  The visualized portion of the paranasal sinuses is clear.  There are findings characteristic of a prior right maxillary antrostomy.  There is mild partial opacification of the right mastoid air cells.                                  Assessment/Plan:     * Right sided weakness    -Based on clinical course, unlikely related to an acute neurologic event. However, will check MRI to rule out.   -Risk stratify with carotid US, 2D echocardiogram and lipid panel.   -Continue ASA and statin.   -Neurochecks.   -PT and OT evaluations.        Left low back pain    -No signs of cord compression.   -Check MRI to further evaluate.   -PT and OT evaluations.        Paronychia of finger, left    Continue Keflex.        Pure hypercholesterolemia    Continue statin.          VTE Risk Mitigation (From admission, onward)        Ordered     enoxaparin injection 40 mg  Daily      02/20/19 1323     IP VTE LOW RISK PATIENT  Once      02/20/19 1323             LUCILA Vera  Department of Hospital Medicine   [facility]

## 2019-02-20 NOTE — PLAN OF CARE
Sw met with patient and family at the bedside. Sw explained role/purpose of visit. Patient states he is independent with ADL's with the use of a cane and lives with spouse. Pt denies having HH at this time. Pt currently as a cane.  Patient denies any post hospital needs or services at this time.  Updated white board with 's name and number. Transitional Care Folder, Discharge Planning Begins on Admission pamphlet, [facility], Advance Directive information given to patient along with the contact information given.Instructed patient or family to call with any questions or concerns.    Transportation: spouse  Ph[facility]: [facility] [address]  DME: cane     02/20/19 0623   Discharge Assessment   Assessment Type Discharge Planning Assessment   Confirmed/corrected address and phone number on facesheet? Yes   Assessment information obtained from? Patient   Prior to hospitilization cognitive status: Alert/Oriented   Prior to hospitalization functional status: Independent;Assistive Equipment   Current cognitive status: Alert/Oriented   Current Functional Status: Independent   Lives With spouse   Able to Return to Prior Arrangements yes   Is patient able to care for self after discharge? Yes   Who are your caregiver(s) and their phone number(s)? Laura Iversonsoco; spouse [phone]   Equipment Currently Used at Home cane, straight   Does the patient have transportation home? Yes   Transportation Anticipated family or friend will provide   Does the patient receive services at the [facility]? No   Discharge Plan A Home with family   Discharge Plan B Home   Patient/Family in Agreement with Plan yes

## 2019-02-20 NOTE — ED NOTES
Pt lying in bed comfortably. AAO x 4. Resp even and unlabored with equal chest rise and fall. Skin warm and dry. 20G PIV noted to R AC. Flushes well, drg CDI. Pt reports mild lower back pain rated 3/10 at this time. Side rails up x 2. Call light within reach. Pt denies any needs or assist at this time.

## 2019-02-20 NOTE — ED PROVIDER NOTES
SCRIBE #1 NOTE: I, Verna Martinez, am scribing for, and in the presence of, Bernardino Jones Jr., MD. I have scribed the entire note.      History      Chief Complaint   Patient presents with    Extremity Weakness     fall 1 month ago & last night- +weakness +back pain w/ numbness to BLE       Review of patient's allergies indicates:   Allergen Reactions    Tetanus vaccines and toxoid Other (See Comments)     Always told he had a reaction to it        HPI   HPI    2/20/2019, 9:19 AM   History obtained from the patient      History of Present Illness: Brett Nugent is a 68 y.o. male patient who presents to the Emergency Department for BLE (R>L) weakness which onset gradually 1 month ago. Pt states sxs have been progressively worsening the past couple of weeks and causing frequent falls. Pt reports fall last night with increased RLE weakness. Symptoms are intermittent and moderate in severity. No mitigating or exacerbating factors reported. Associated sxs include intermittent RLE numbness/tinging and RUE weakness/numbness/tingling. Patient denies any CP, SOB, diaphoresis, palpitations, leg pain/swelling, dizziness, cough, n/v/d, abd pain, fever, chills, HA, neck pain, dysuria, hematuria, speech difficulty, facial asymmetry, and all other sxs at this time. No further complaints or concerns at this time.         Arrival mode: Ambulance Service    PCP: Leonardo Khan MD       Past Medical History:  Past Medical History:   Diagnosis Date    GERD (gastroesophageal reflux disease)     Hyperlipidemia     Osteoporosis        Past Surgical History:  Past Surgical History:   Procedure Laterality Date    PROSTATE SURGERY      SINUS SURGERY           Family History:  History reviewed. No pertinent family history.    Social History:  Social History     Tobacco Use    Smoking status: Never Smoker    Smokeless tobacco: Never Used   Substance and Sexual Activity    Alcohol use: No    Drug use: No    Sexual activity: Yes      Partners: Female       ROS   Review of Systems   Constitutional: Negative for chills, diaphoresis and fever.   HENT: Negative for sore throat.    Respiratory: Negative for cough and shortness of breath.    Cardiovascular: Negative for chest pain, palpitations and leg swelling.   Gastrointestinal: Negative for abdominal pain, diarrhea, nausea and vomiting.   Genitourinary: Negative for dysuria, frequency and hematuria.   Musculoskeletal: Negative for back pain and neck pain.   Skin: Negative for rash.   Neurological: Positive for weakness (BLE (R>L), RUE) and numbness (RLE/RUE). Negative for dizziness, facial asymmetry, speech difficulty and headaches.   Hematological: Does not bruise/bleed easily.   All other systems reviewed and are negative.      Physical Exam      Initial Vitals [02/20/19 0905]   BP Pulse Resp Temp SpO2   (!) 172/96 64 14 98.7 °F (37.1 °C) 98 %      MAP       --          Physical Exam  Nursing Notes and Vital Signs Reviewed.  Constitutional: Patient is in no acute distress. Well-developed and well-nourished.  Head: Atraumatic. Normocephalic.  Eyes: PERRL. EOM intact. Conjunctivae are not pale. No scleral icterus.  ENT: Mucous membranes are moist. Oropharynx is clear and symmetric.    Neck: Supple. Full ROM. No lymphadenopathy.  Cardiovascular: Regular rate. Regular rhythm. No murmurs, rubs, or gallops. Distal pulses are 2+ and symmetric.  Pulmonary/Chest: No respiratory distress. Clear to auscultation bilaterally. No wheezing or rales.  Abdominal: Soft and non-distended.  There is no tenderness.  No rebound, guarding, or rigidity.   Musculoskeletal: Moves all extremities. No obvious deformities. No edema. No calf tenderness.  Skin: Warm and dry.  Neurological: Patient is alert and oriented to person, place and time. Pupils ERRL and EOM normal. Cranial nerves II-XII are intact. Strength is full bilaterally; it is equal and 5/5 in bilateral upper and lower extremities. There is no pronator  "drift of outstretched arms. Light touch sense is intact. Speech is clear and normal. No acute focal neurological deficits noted.  Psychiatric: Normal affect. Good eye contact. Appropriate in content.    ED Course    Procedures  ED Vital Signs:  Vitals:    02/20/19 0905 02/20/19 0907 02/20/19 0923 02/20/19 0924   BP: (!) 172/96   (!) 167/88   Pulse: 64   61   Resp: 14 20    Temp: 98.7 °F (37.1 °C)      TempSrc: Oral      SpO2: 98%   97%   Weight:  78.3 kg (172 lb 9.9 oz)     Height:  6' 2" (1.88 m)      02/20/19 0945 02/20/19 1002 02/20/19 1102   BP:  (!) 159/85 (!) 147/85   Pulse: 66 80 64   Resp:  20 19   Temp:      TempSrc:      SpO2:  96% 97%   Weight:      Height:          Abnormal Lab Results:  Labs Reviewed   CBC W/ AUTO DIFFERENTIAL - Abnormal; Notable for the following components:       Result Value    Gran # (ANC) 8.6 (*)     Gran% 79.8 (*)     Lymph% 13.6 (*)     All other components within normal limits   COMPREHENSIVE METABOLIC PANEL - Abnormal; Notable for the following components:    BUN, Bld 29 (*)     Alkaline Phosphatase 51 (*)     All other components within normal limits   PROTIME-INR   APTT   URINALYSIS   TROPONIN I   LIPID PANEL   HEPATITIS C ANTIBODY        All Lab Results:  Results for orders placed or performed during the hospital encounter of 02/20/19   CBC auto differential   Result Value Ref Range    WBC 10.77 3.90 - 12.70 K/uL    RBC 4.99 4.60 - 6.20 M/uL    Hemoglobin 14.8 14.0 - 18.0 g/dL    Hematocrit 45.6 40.0 - 54.0 %    MCV 91 82 - 98 fL    MCH 29.7 27.0 - 31.0 pg    MCHC 32.5 32.0 - 36.0 g/dL    RDW 14.3 11.5 - 14.5 %    Platelets 182 150 - 350 K/uL    MPV 9.4 9.2 - 12.9 fL    Gran # (ANC) 8.6 (H) 1.8 - 7.7 K/uL    Lymph # 1.5 1.0 - 4.8 K/uL    Mono # 0.6 0.3 - 1.0 K/uL    Eos # 0.1 0.0 - 0.5 K/uL    Baso # 0.02 0.00 - 0.20 K/uL    Gran% 79.8 (H) 38.0 - 73.0 %    Lymph% 13.6 (L) 18.0 - 48.0 %    Mono% 5.5 4.0 - 15.0 %    Eosinophil% 0.9 0.0 - 8.0 %    Basophil% 0.2 0.0 - 1.9 %    " Differential Method Automated    Comprehensive metabolic panel   Result Value Ref Range    Sodium 144 136 - 145 mmol/L    Potassium 4.3 3.5 - 5.1 mmol/L    Chloride 106 95 - 110 mmol/L    CO2 27 23 - 29 mmol/L    Glucose 86 70 - 110 mg/dL    BUN, Bld 29 (H) 8 - 23 mg/dL    Creatinine 0.8 0.5 - 1.4 mg/dL    Calcium 9.9 8.7 - 10.5 mg/dL    Total Protein 7.1 6.0 - 8.4 g/dL    Albumin 3.8 3.5 - 5.2 g/dL    Total Bilirubin 0.6 0.1 - 1.0 mg/dL    Alkaline Phosphatase 51 (L) 55 - 135 U/L    AST 17 10 - 40 U/L    ALT 12 10 - 44 U/L    Anion Gap 11 8 - 16 mmol/L    eGFR if African American >60 >60 mL/min/1.73 m^2    eGFR if non African American >60 >60 mL/min/1.73 m^2   Protime-INR   Result Value Ref Range    Prothrombin Time 10.5 9.0 - 12.5 sec    INR 1.0 0.8 - 1.2   APTT   Result Value Ref Range    aPTT 26.2 21.0 - 32.0 sec   Urinalysis   Result Value Ref Range    Specimen UA Urine, Clean Catch     Color, UA Yellow Yellow, Straw, Kisha    Appearance, UA Clear Clear    pH, UA 7.0 5.0 - 8.0    Specific Gravity, UA 1.015 1.005 - 1.030    Protein, UA Negative Negative    Glucose, UA Negative Negative    Ketones, UA Negative Negative    Bilirubin (UA) Negative Negative    Occult Blood UA Negative Negative    Nitrite, UA Negative Negative    Urobilinogen, UA Negative <2.0 EU/dL    Leukocytes, UA Negative Negative   Troponin I   Result Value Ref Range    Troponin I <0.006 0.000 - 0.026 ng/mL         Imaging Results:  Imaging Results          CT Head Without Contrast (Final result)  Result time 02/20/19 09:57:49    Final result by PAOLA Lewis Sr., MD (02/20/19 09:57:49)                 Impression:      1. There is no evidence of an acute ischemic event.  2. There is no intracranial hemorrhage.  3. The visualized portion of the paranasal sinuses is clear.  There are findings characteristic of a prior right maxillary antrostomy.  4. There is mild partial opacification of the right mastoid air cells.  All CT scans at this  facility use dose modulation, iterative reconstruction, and/or weight base dosing when appropriate to reduce radiation dose when appropriate to reduce radiation dose to as low as reasonably achievable.      Electronically signed by: Domenic Lewis MD  Date:    02/20/2019  Time:    09:57             Narrative:    EXAMINATION:  CT HEAD WITHOUT CONTRAST    CLINICAL HISTORY:  Focal neuro deficit, new, fixed or worsening, >6 hours;    TECHNIQUE:  Standard brain CT protocol without IV contrast was performed.    COMPARISON:  01/29/2019    FINDINGS:  There is no evidence of an acute ischemic event.  There is no intracranial hemorrhage.  There is no skull fracture.  The visualized portion of the paranasal sinuses is clear.  There are findings characteristic of a prior right maxillary antrostomy.  There is mild partial opacification of the right mastoid air cells.                               The EKG was ordered, reviewed, and independently interpreted by the ED provider.  Interpretation time: 0925  Rate: 61 BPM  Rhythm: normal sinus rhythm  Interpretation: No acute ST changes. No STEMI.             The Emergency Provider reviewed the vital signs and test results, which are outlined above.    ED Discussion     10:43 AM: Discussed case with LUCILA Vera ([facility]). Dr. Ellington agrees with current care and management of pt and accepts admission.   Admitting Service: [facility]   Admitting Physician: Dr. Ellington  Admit to: Obs-Tele    10:49 AM: Re-evaluated pt. I have discussed test results, shared treatment plan, and the need for admission with patient and family at bedside. Pt and family express understanding at this time and agree with all information. All questions answered. Pt and family have no further questions or concerns at this time. Pt is ready for admit.      ED Medication(s):  Medications - No data to display          Medical Decision Making    Medical Decision Making:   Clinical Tests:    Lab Tests: Ordered and Reviewed  Radiological Study: Ordered and Reviewed  Medical Tests: Ordered and Reviewed           Scribe Attestation:   Scribe #1: I performed the above scribed service and the documentation accurately describes the services I performed. I attest to the accuracy of the note.    Attending:   Physician Attestation Statement for Scribe #1: I, Bernardino Jones Jr., MD, personally performed the services described in this documentation, as scribed by Verna Martinez, in my presence, and it is both accurate and complete.          Clinical Impression       ICD-10-CM ICD-9-CM   1. TIA (transient ischemic attack) G45.9 435.9   2. Weakness R53.1 780.79   3. Right sided weakness R53.1 728.87       Disposition:   Disposition: Placed in Observation  Condition: Fair         Bernardino Jones Jr., MD  02/20/19 8711

## 2019-02-20 NOTE — PLAN OF CARE
Problem: Adult Inpatient Plan of Care  Goal: Patient-Specific Goal (Individualization)  Outcome: Ongoing (interventions implemented as appropriate)  Patient awake and alert, in NAD. Patient had uneventful shift. VS stable. Patient denies pain or SOB. Patient on telemetry, NSR. Patient ambulates with stand by assistance. Fall precautions in place. Bed locked and in lowest position. Yellow fall risk band and non-skid socks on patient. Patient free from fall/injury. Plan of care reviewed with patient. All questions answered. Will continue to monitor.

## 2019-02-20 NOTE — ED NOTES
Spoke with Tata on telemetry regarding pt transport upstairs. She stated she spoke with the charge nurse and they were unaware they were getting this pt and that instead of going to room 223 they would be going to 248. She then stated she was transferring me to the nurse. The nurse Ute stated that she was at lunch and she just got to the cafeteria that she needed at least 20 more minutes to be ready. Charge nurse, Jodie ELDRIDGE informed.

## 2019-02-20 NOTE — SUBJECTIVE & OBJECTIVE
Past Medical History:   Diagnosis Date    GERD (gastroesophageal reflux disease)     Hyperlipidemia     Osteoporosis        Past Surgical History:   Procedure Laterality Date    PROSTATE SURGERY      SINUS SURGERY         Review of patient's allergies indicates:   Allergen Reactions    Tetanus vaccines and toxoid Other (See Comments)     Always told he had a reaction to it       Current Facility-Administered Medications on File Prior to Encounter   Medication    denosumab (PROLIA) injection 60 mg     Current Outpatient Medications on File Prior to Encounter   Medication Sig    aspirin (ECOTRIN) 81 MG EC tablet Take 81 mg by mouth once daily.    cephALEXin (KEFLEX) 500 MG capsule Take 1 capsule (500 mg total) by mouth every 8 (eight) hours. for 10 days    cycloSPORINE (RESTASIS) 0.05 % ophthalmic emulsion Place 1 drop into both eyes 2 (two) times daily.    esomeprazole (NEXIUM) 40 MG capsule Take 1 capsule (40 mg total) by mouth once daily.    ORTHO DF 3,775 unit- 1 mg Cap TAKE ONE CAPSULE BY MOUTH TWICE DAILY    simvastatin (ZOCOR) 40 MG tablet Take 1 tablet (40 mg total) by mouth every evening.     Family History     None        Tobacco Use    Smoking status: Never Smoker    Smokeless tobacco: Never Used   Substance and Sexual Activity    Alcohol use: No    Drug use: No    Sexual activity: Yes     Partners: Female     Review of Systems   Constitutional: Negative for appetite change, chills, diaphoresis, fatigue and fever.   HENT: Negative for congestion, ear pain, mouth sores, sore throat and trouble swallowing.    Eyes: Negative for pain and visual disturbance.   Respiratory: Negative for cough, chest tightness and shortness of breath.    Cardiovascular: Negative for chest pain, palpitations and leg swelling.   Gastrointestinal: Negative for abdominal pain, constipation, diarrhea and nausea.   Endocrine: Negative for cold intolerance, heat intolerance, polydipsia and polyuria.   Genitourinary:  Negative for dysuria, frequency and hematuria.   Musculoskeletal: Positive for back pain (lower left). Negative for arthralgias, myalgias and neck pain.   Skin: Negative for pallor, rash and wound.   Allergic/Immunologic: Negative for environmental allergies and immunocompromised state.   Neurological: Positive for weakness (right upper and lower extremity) and numbness. Negative for dizziness, seizures, syncope and headaches.        Positive for falls and changes in equilibrium.    Hematological: Negative for adenopathy. Does not bruise/bleed easily.   Psychiatric/Behavioral: Negative for agitation, confusion and sleep disturbance.     Objective:     Vital Signs (Most Recent):  Temp: 98.7 °F (37.1 °C) (02/20/19 0905)  Pulse: 82 (02/20/19 1206)  Resp: 20 (02/20/19 1206)  BP: (!) 142/81 (02/20/19 1206)  SpO2: 97 % (02/20/19 1206) Vital Signs (24h Range):  Temp:  [98.7 °F (37.1 °C)] 98.7 °F (37.1 °C)  Pulse:  [61-82] 82  Resp:  [14-20] 20  SpO2:  [96 %-98 %] 97 %  BP: (142-172)/(81-96) 142/81     Weight: 78.3 kg (172 lb 9.9 oz)  Body mass index is 22.16 kg/m².    Physical Exam   Constitutional: He is oriented to person, place, and time. He appears well-developed and well-nourished.   HENT:   Head: Normocephalic and atraumatic.   Eyes: Conjunctivae are normal.   Neck: Neck supple. No JVD present.   Cardiovascular: Normal rate, regular rhythm and normal heart sounds.   Pulmonary/Chest: Effort normal and breath sounds normal. He has no wheezes.   Abdominal: Soft. Bowel sounds are normal. He exhibits no distension. There is no tenderness.   Musculoskeletal: Normal range of motion.   Left 2nd distal finger deformity consistent with paronychia.    Neurological: He is alert and oriented to person, place, and time.   Slight decreased strength right upper extremity.    Skin: Skin is warm and dry. No rash noted.   Psychiatric: He has a normal mood and affect. His behavior is normal. Thought content normal.   Nursing note and  vitals reviewed.          Significant Labs:   CBC:   Recent Labs   Lab 02/20/19  0934   WBC 10.77   HGB 14.8   HCT 45.6        CMP:   Recent Labs   Lab 02/20/19  0934      K 4.3      CO2 27   GLU 86   BUN 29*   CREATININE 0.8   CALCIUM 9.9   PROT 7.1   ALBUMIN 3.8   BILITOT 0.6   ALKPHOS 51*   AST 17   ALT 12   ANIONGAP 11   EGFRNONAA >60     All pertinent labs within the past 24 hours have been reviewed.    Significant Imaging: I have reviewed all pertinent imaging results/findings within the past 24 hours.   Imaging Results          US Carotid Bilateral (Final result)  Result time 02/20/19 13:05:14    Final result by Brett Whalen MD (02/20/19 13:05:14)                 Impression:      No significant stenosis.    **Categories of stenosis (0-15%, 16-49%, 50-69% and 70-99% ) are based on published criteria that have been internally validated.  Degree of stenosis is based on NASCET criteria and refers to the degree of luminal diameter narrowing as a percentage of the normal ICA distal to the stenosis and any area of post stenotic dilation.      Electronically signed by: Brett Whalen MD  Date:    02/20/2019  Time:    13:05             Narrative:    EXAMINATION:  US CAROTID BILATERAL    CLINICAL HISTORY:  right sided weakness;    COMPARISON:  None    FINDINGS:  Right common carotid:    Peak systolic velocity 84 cm/sec.    Right internal carotid artery: Mild plaque is seen in the bulb    Peak systolic velocity: 81 cm/sec.    IC/CC ratio:  0.96.    Left common carotid:    Peak systolic velocity 113 cm/sec.    Left internal carotid artery: Mild plaque is seen in the bulb    Peak systolic velocity 102 cm/sec.    IC/CC ratio 0.9.    Both vertebral arteries demonstrate antegrade flow.                               CT Head Without Contrast (Final result)  Result time 02/20/19 09:57:49    Final result by PAOLA Lewis Sr., MD (02/20/19 09:57:49)                 Impression:      1. There is no evidence  of an acute ischemic event.  2. There is no intracranial hemorrhage.  3. The visualized portion of the paranasal sinuses is clear.  There are findings characteristic of a prior right maxillary antrostomy.  4. There is mild partial opacification of the right mastoid air cells.  All CT scans at this facility use dose modulation, iterative reconstruction, and/or weight base dosing when appropriate to reduce radiation dose when appropriate to reduce radiation dose to as low as reasonably achievable.      Electronically signed by: Domenic Lewis MD  Date:    02/20/2019  Time:    09:57             Narrative:    EXAMINATION:  CT HEAD WITHOUT CONTRAST    CLINICAL HISTORY:  Focal neuro deficit, new, fixed or worsening, >6 hours;    TECHNIQUE:  Standard brain CT protocol without IV contrast was performed.    COMPARISON:  01/29/2019    FINDINGS:  There is no evidence of an acute ischemic event.  There is no intracranial hemorrhage.  There is no skull fracture.  The visualized portion of the paranasal sinuses is clear.  There are findings characteristic of a prior right maxillary antrostomy.  There is mild partial opacification of the right mastoid air cells.

## 2019-02-20 NOTE — HPI
Brett Nugent is a 68 year old male with hyperlipidemia who presented to the ED after a fall at home last night. The patient has multiple complaints including right sided weakness, right sided numbness and decreased right upper extremity fine motor skills that began approximately 1 month ago. In the last two weeks, the patient also notes left lumbar spine pain with associated left lower extremity pain. Additionally, he reports several months of balance difficulties. Despite all of these complaints, he has only experienced 3 falls within the last month. He denies confusion, changes in speech, difficulty swallowing as well as bowel and bladder incontinence. Code status was discussed with the patient and his wife. He is a DNR. His wife, Laura Nugent, is his surrogate medical decision maker.

## 2019-02-20 NOTE — PT/OT/SLP EVAL
Occupational Therapy   Evaluation    OT Eval completed per order from Dr. Armas.    Name: Brett Nugent  MRN: [id number]  Admitting Diagnosis:  Right sided weakness      Recommendations:     Discharge Recommendations: (Home health vs outpatient)  Discharge Equipment Recommendations:  walker, rolling  Barriers to discharge:  None    Assessment:     Brett Nugent is a 68 y.o. male with a medical diagnosis of Right sided weakness.  He presents with: weakness, impaired endurance, gait instability, impaired functional mobilty, impaired self care skills, impaired balance, decreased lower extremity function, decreased upper extremity function, decreased coordination, decreased safety awareness, pain.      Rehab Prognosis: Good; patient would benefit from acute skilled OT services to address these deficits and reach maximum level of function.       Plan:     Patient to be seen 3 x/week(Pt to be seen a minimum of 3 out of 7 days a week for skilled OT) to address the above listed problems via self-care/home management, therapeutic activities, therapeutic exercises  · Plan of Care Expires: 02/27/19  · Plan of Care Reviewed with: patient, spouse    Subjective     Chief Complaint: (R) sided weakness  Patient/Family Comments/goals: to be as independent as possible    Occupational Profile:  Living Environment: lives with wife in 1 story house with no steps  Previous level of function: (I) with ADLs and started using SPC for ambulation ~3weeks ago, (I) prior   Roles and Routines: Drives and wors as a   Equipment Used at Home:  cane, straight  Assistance upon Discharge: wife    Pain/Comfort:  · Pain Rating 1: 3/10  · Location - Side 1: Bilateral  · Location 1: back  · Pain Addressed 1: Reposition, Distraction  · Pain Rating Post-Intervention 1: 3/10    Patients cultural, spiritual, Yarsanism conflicts given the current situation: no    Objective:     Communicated with: Nurse Galicia and [facility] chart review prior to  session.  Patient found supine with blood pressure cuff, pulse ox (continuous), telemetry, peripheral IV upon OT entry to room.    General Precautions: Standard, fall   Orthopedic Precautions:N/A   Braces: N/A     Occupational Performance:    Bed Mobility:    · Patient completed Rolling/Turning to Left with  stand by assistance  · Patient completed Rolling/Turning to Right with stand by assistance  · Patient completed Scooting/Bridging with stand by assistance  · Patient completed Supine to Sit with minimum assistance    Functional Mobility/Transfers:  · Patient completed Sit <> Stand Transfer with minimum assistance  with  rolling walker   · Patient completed Bed <> Chair Transfer using Step Transfer technique with minimum assistance with rolling walker  · Functional Mobility: ~50ft using RW with Min A    Activities of Daily Living:  · Feeding:  modified independence .  · Upper Body Dressing: minimum assistance .  · Lower Body Dressing: minimum assistance .    Cognitive/Visual Perceptual:  Cognitive/Psychosocial Skills:     -       Oriented to: Person, Place, Time and Situation   -       Follows Commands/attention:Follows multistep  commands  -       Communication: clear/fluent  -       Memory: No Deficits noted  -       Safety awareness/insight to disability: impaired   Visual/Perceptual:      -Intact .    Physical Exam:  Postural examination/scapula alignment:    -       Rounded shoulders  -       Forward head  Sensation:    -       Impaired  numbness/tingling in (R) LE  Dominant hand:    -       left  Upper Extremity Range of Motion:     -       Right Upper Extremity: decreased at shoulder  -       Left Upper Extremity: WNL  Upper Extremity Strength:    -       Right Upper Extremity: 4/5 grossly  -       Left Upper Extremity: WNL   Strength:    -       Right Upper Extremity: decreased  -       Left Upper Extremity: WNL    AMPAC 6 Click ADL:  AMPAC Total Score: 20    Treatment & Education:  OT eval completed  as listed above. Pt and wife educated in role of OT and POC.   Education:    Patient left sitting EOB and set up to eat lunch with all lines intact, call button in reach, Nurse Frida notified and wife present    GOALS:   Multidisciplinary Problems     Occupational Therapy Goals        Problem: Occupational Therapy Goal    Goal Priority Disciplines Outcome Interventions   Occupational Therapy Goal     OT, PT/OT     Description:  LTGs to be met by 2/27/19  1. Pt will perform bed mobility with Mod I  2. Pt will perform commode t/fs with Supervision  3. Pt will perform LE dressing with Supervision  4. Pt tolerate (B) UE ROM exercises 1 x 20 reps                    History:     Past Medical History:   Diagnosis Date    GERD (gastroesophageal reflux disease)     Hyperlipidemia     Osteoporosis        Past Surgical History:   Procedure Laterality Date    PROSTATE SURGERY      SINUS SURGERY         Time Tracking:     OT Date of Treatment: 02/20/19  OT Start Time: 1200  OT Stop Time: 1215  OT Total Time (min): 15 min    Billable Minutes:Evaluation 15 min    Anabela Pedraza, PT/OT  2/20/2019

## 2019-02-20 NOTE — PLAN OF CARE
02/20/19 1604   BUTTS Message   Medicare Outpatient and Observation Notification regarding financial responsibility Given to patient/caregiver;Explained to patient/caregiver;Signed/date by patient/caregiver   Date BUTTS was signed 02/20/19   Time BUTTS was signed 160

## 2019-02-21 PROBLEM — M48.061 LUMBAR STENOSIS: Status: ACTIVE | Noted: 2019-02-20

## 2019-02-21 LAB
ANION GAP SERPL CALC-SCNC: 7 MMOL/L
BASOPHILS # BLD AUTO: 0.01 K/UL
BASOPHILS NFR BLD: 0.1 %
BUN SERPL-MCNC: 23 MG/DL
CALCIUM SERPL-MCNC: 8.7 MG/DL
CHLORIDE SERPL-SCNC: 107 MMOL/L
CO2 SERPL-SCNC: 27 MMOL/L
CREAT SERPL-MCNC: 0.8 MG/DL
DIFFERENTIAL METHOD: NORMAL
EOSINOPHIL # BLD AUTO: 0.1 K/UL
EOSINOPHIL NFR BLD: 1.6 %
ERYTHROCYTE [DISTWIDTH] IN BLOOD BY AUTOMATED COUNT: 14.4 %
EST. GFR  (AFRICAN AMERICAN): >60 ML/MIN/1.73 M^2
EST. GFR  (NON AFRICAN AMERICAN): >60 ML/MIN/1.73 M^2
GLUCOSE SERPL-MCNC: 95 MG/DL
HCT VFR BLD AUTO: 43.6 %
HCV AB SERPL QL IA: NEGATIVE
HGB BLD-MCNC: 14 G/DL
LYMPHOCYTES # BLD AUTO: 1.4 K/UL
LYMPHOCYTES NFR BLD: 20.1 %
MAGNESIUM SERPL-MCNC: 2 MG/DL
MCH RBC QN AUTO: 29.5 PG
MCHC RBC AUTO-ENTMCNC: 32.1 G/DL
MCV RBC AUTO: 92 FL
MONOCYTES # BLD AUTO: 0.5 K/UL
MONOCYTES NFR BLD: 7.5 %
NEUTROPHILS # BLD AUTO: 4.8 K/UL
NEUTROPHILS NFR BLD: 70.7 %
PHOSPHATE SERPL-MCNC: 3.2 MG/DL
PLATELET # BLD AUTO: 181 K/UL
PMV BLD AUTO: 9.7 FL
POTASSIUM SERPL-SCNC: 4.1 MMOL/L
RBC # BLD AUTO: 4.74 M/UL
SODIUM SERPL-SCNC: 141 MMOL/L
WBC # BLD AUTO: 6.83 K/UL

## 2019-02-21 PROCEDURE — 99204 PR OFFICE/OUTPT VISIT, NEW, LEVL IV, 45-59 MIN: ICD-10-PCS | Mod: ,,, | Performed by: NEUROLOGICAL SURGERY

## 2019-02-21 PROCEDURE — 99204 OFFICE O/P NEW MOD 45 MIN: CPT | Mod: ,,, | Performed by: NEUROLOGICAL SURGERY

## 2019-02-21 PROCEDURE — 83735 ASSAY OF MAGNESIUM: CPT

## 2019-02-21 PROCEDURE — 97110 THERAPEUTIC EXERCISES: CPT

## 2019-02-21 PROCEDURE — 97530 THERAPEUTIC ACTIVITIES: CPT

## 2019-02-21 PROCEDURE — 63600175 PHARM REV CODE 636 W HCPCS: Performed by: PHYSICIAN ASSISTANT

## 2019-02-21 PROCEDURE — 97116 GAIT TRAINING THERAPY: CPT | Mod: 59

## 2019-02-21 PROCEDURE — 25000003 PHARM REV CODE 250: Performed by: PHYSICIAN ASSISTANT

## 2019-02-21 PROCEDURE — 80048 BASIC METABOLIC PNL TOTAL CA: CPT

## 2019-02-21 PROCEDURE — 96372 THER/PROPH/DIAG INJ SC/IM: CPT | Mod: 59 | Performed by: EMERGENCY MEDICINE

## 2019-02-21 PROCEDURE — 85025 COMPLETE CBC W/AUTO DIFF WBC: CPT

## 2019-02-21 PROCEDURE — 36415 COLL VENOUS BLD VENIPUNCTURE: CPT

## 2019-02-21 PROCEDURE — G0378 HOSPITAL OBSERVATION PER HR: HCPCS

## 2019-02-21 PROCEDURE — 25000003 PHARM REV CODE 250: Performed by: INTERNAL MEDICINE

## 2019-02-21 PROCEDURE — 84100 ASSAY OF PHOSPHORUS: CPT

## 2019-02-21 RX ORDER — ALPRAZOLAM 0.5 MG/1
0.5 TABLET ORAL ONCE
Status: COMPLETED | OUTPATIENT
Start: 2019-02-21 | End: 2019-02-21

## 2019-02-21 RX ADMIN — ENOXAPARIN SODIUM 40 MG: 100 INJECTION SUBCUTANEOUS at 04:02

## 2019-02-21 RX ADMIN — SIMVASTATIN 40 MG: 20 TABLET, FILM COATED ORAL at 09:02

## 2019-02-21 RX ADMIN — ASPIRIN 81 MG: 81 TABLET, COATED ORAL at 10:02

## 2019-02-21 RX ADMIN — ALPRAZOLAM 0.5 MG: 0.5 TABLET ORAL at 04:02

## 2019-02-21 RX ADMIN — PANTOPRAZOLE SODIUM 40 MG: 40 TABLET, DELAYED RELEASE ORAL at 10:02

## 2019-02-21 RX ADMIN — CEPHALEXIN 500 MG: 500 CAPSULE ORAL at 09:02

## 2019-02-21 RX ADMIN — CEPHALEXIN 500 MG: 500 CAPSULE ORAL at 05:02

## 2019-02-21 RX ADMIN — CEPHALEXIN 500 MG: 500 CAPSULE ORAL at 01:02

## 2019-02-21 NOTE — CONSULTS
[facility] -   Neurosurgery  Consult Note    Inpatient consult to Neurosurgery  Consult performed by: Helio Wolfe MD  Consult ordered by: LUCILA Vera        Subjective:     Chief Complaint/Reason for Admission: Right Sided Weakness    History of Present Illness: Brett Nugent is a 68 year old male with HLD who presented to the ED after a fall at home 2 days ago. The patient has multiple complaints including right sided weakness and right sided numbness of both the right upper and lower extremity. Patient states he has experienced episodes of his right leg buckling underneath him which have led to his falls. Patient also complains of decreased right upper extremity fine motor skills that began approximately 1 month ago.  In the last four weeks, the patient also notes left lumbar spine pain with associated left lower extremity pain. Patient states he gets shooting pain with weightbearing on left lower extremity. Additionally, he reports several months of balance difficulties.  He does endorse past knowledge of scoliosis of the spine. He denies confusion, changes in speech, difficulty swallowing as well as bowel and bladder incontinence. Patient was admitted for possible TIA. MRI Brain showed no acute inciting factors for focal weakness. MRI Lumbar Spine shows L4-5 spondylolisthesis and foraminal stenosis at L2-3, L3-4, and L4-5. Hospital Medicine placed a Neurosurgery consult at that time.          Facility-Administered Medications Prior to Admission   Medication    denosumab (PROLIA) injection 60 mg     PTA Medications   Medication Sig    aspirin (ECOTRIN) 81 MG EC tablet Take 81 mg by mouth once daily.    cephALEXin (KEFLEX) 500 MG capsule Take 1 capsule (500 mg total) by mouth every 8 (eight) hours. for 10 days    cycloSPORINE (RESTASIS) 0.05 % ophthalmic emulsion Place 1 drop into both eyes 2 (two) times daily.    esomeprazole (NEXIUM) 40 MG capsule Take 1 capsule (40 mg total) by  mouth once daily.    ORTHO DF 3,775 unit- 1 mg Cap TAKE ONE CAPSULE BY MOUTH TWICE DAILY    simvastatin (ZOCOR) 40 MG tablet Take 1 tablet (40 mg total) by mouth every evening.       Review of patient's allergies indicates:   Allergen Reactions    Tetanus vaccines and toxoid Other (See Comments)     Always told he had a reaction to it       Past Medical History:   Diagnosis Date    GERD (gastroesophageal reflux disease)     Hyperlipidemia     Osteoporosis      Past Surgical History:   Procedure Laterality Date    PROSTATE SURGERY      SINUS SURGERY       Family History     None        Tobacco Use    Smoking status: Never Smoker    Smokeless tobacco: Never Used   Substance and Sexual Activity    Alcohol use: No    Drug use: No    Sexual activity: Yes     Partners: Female     Review of Systems   Constitutional: Negative for chills, fatigue and fever.   Respiratory: Negative for cough, shortness of breath and stridor.    Cardiovascular: Negative for chest pain, palpitations and leg swelling.   Gastrointestinal: Negative for abdominal pain.   Genitourinary: Negative for dysuria and enuresis.   Musculoskeletal: Positive for gait problem and neck pain. Negative for joint swelling.   Neurological: Positive for weakness and numbness. Negative for syncope.     Objective:     Weight: 78.3 kg (172 lb 9.9 oz)  Body mass index is 22.16 kg/m².  Vital Signs (Most Recent):  Temp: 98 °F (36.7 °C) (02/21/19 1116)  Pulse: 70 (02/21/19 1116)  Resp: 18 (02/21/19 1116)  BP: 124/80 (02/21/19 1116)  SpO2: 97 % (02/21/19 1116) Vital Signs (24h Range):  Temp:  [97.7 °F (36.5 °C)-99 °F (37.2 °C)] 98 °F (36.7 °C)  Pulse:  [56-82] 70  Resp:  [15-20] 18  SpO2:  [94 %-98 %] 97 %  BP: (122-157)/(72-84) 124/80                           Physical Exam:  Nursing note and vitals reviewed.    Constitutional: He appears well-developed and well-nourished. He is not diaphoretic. No distress.     Eyes: Pupils are equal, round, and reactive to  light. EOM are normal.     Cardiovascular: Normal pulses, intact distal pulses, normal distal pulses, intact carotid pulses, normal carotid pulses and no edema.     Abdominal: Soft.     Skin: Skin displays no rash on trunk.     Psych/Behavior: He is alert. He is oriented to person, place, and time. He has a normal mood and affect.     Musculoskeletal: Gait is abnormal.        Neck: Range of motion is limited. There is tenderness. Muscle strength is 4/5. Tone is normal.        Back: Range of motion is limited. There is tenderness. Muscle strength is 4/5. Tone is normal.        Right Upper Extremities: Range of motion is full. There is no tenderness. Muscle strength is 4/5. Tone is normal.        Left Upper Extremities: Range of motion is full. There is no tenderness. Muscle strength is 5/5. Tone is normal.       Right Lower Extremities: Range of motion is full. There is no tenderness. Muscle strength is 4/5. Tone is normal.        Left Lower Extremities: Range of motion is full. There is tenderness. Muscle strength is 5/5. Tone is normal.     Neurological:        Coordination: He has a normal Romberg Test, normal finger to nose coordination, normal heel to shin coordination and normal tandem walking coordination.        Sensory: There is sensory deficit in the trunk. Sensory deficit is located Right-sided upper and lower. There is sensory deficit in the extremities.        DTRs: DTRs are DTRS NORMAL AND SYMMETRICnormal and symmetric. Tricep reflexes are 2+ on the right side and 2+ on the left side. Bicep reflexes are 2+ on the right side and 2+ on the left side. Brachioradialis reflexes are 2+ on the right side and 2+ on the left side. Patellar reflexes are 2+ on the right side and 2+ on the left side. Achilles reflexes are 2+ on the right side and 2+ on the left side. He displays no Babinski's sign on the right side. He displays no Babinski's sign on the left side.        Cranial nerves: Cranial nerve(s) II, III,  IV, V, VI, VII, VIII, IX, X, XI and XII are intact.       Significant Labs:  Recent Labs   Lab 02/20/19  0934 02/21/19  0359   GLU 86 95    141   K 4.3 4.1    107   CO2 27 27   BUN 29* 23   CREATININE 0.8 0.8   CALCIUM 9.9 8.7   MG  --  2.0     Recent Labs   Lab 02/20/19  0934 02/21/19  0359   WBC 10.77 6.83   HGB 14.8 14.0   HCT 45.6 43.6    181     Recent Labs   Lab 02/20/19 0934   INR 1.0   APTT 26.2     Microbiology Results (last 7 days)     ** No results found for the last 168 hours. **        CMP:   Recent Labs   Lab 02/20/19  0934 02/21/19  0359   GLU 86 95   CALCIUM 9.9 8.7   ALBUMIN 3.8  --    PROT 7.1  --     141   K 4.3 4.1   CO2 27 27    107   BUN 29* 23   CREATININE 0.8 0.8   ALKPHOS 51*  --    ALT 12  --    AST 17  --    BILITOT 0.6  --      All pertinent labs from the last 24 hours have been reviewed.  Significant Diagnostics:  MRI: Mri Brain Without Contrast    Result Date: 2/20/2019  No acute abnormality.  Chronic changes of small vessel disease are noted.  Some patchy fluid in the right mastoid air cells is present. Electronically signed by: Amaury Ortiz MD Date:    02/20/2019 Time:    19:00    MRI Lumbar Spine:   Thoracolumbar scoliosis.  L4-5 spondylolisthesis.  Multilevel degenerative disc disease with facet arthritis resulting in central and high-grade foraminal stenosis as described above in detail.  L2-3, L3-4 and L4-5 foraminal stenosis with possible nerve root impingement as above.      Assessment/Plan:     Active Diagnoses:    Diagnosis Date Noted POA    PRINCIPAL PROBLEM:  Right sided weakness [R53.1] 02/20/2019 Yes    Left low back pain [M54.5] 02/20/2019 Yes    Paronychia of finger, left [L03.012] 02/20/2019 Yes    Pure hypercholesterolemia [E78.00] 09/08/2017 Yes      Problems Resolved During this Admission:     MRI Cervical Spine for RUE symptoms  Alprazolam 0.5 mg for MRI Claustrophobia    Thank you for your consult. I will follow-up with  patient. Please contact us if you have any additional questions.    Kathy Elizabeth PA-C  Neurosurgery  [facility]

## 2019-02-21 NOTE — PLAN OF CARE
Problem: Adult Inpatient Plan of Care  Goal: Plan of Care Review  Outcome: Ongoing (interventions implemented as appropriate)  Dx nt. Possible tia  poc instructed on dbc 10 x q1h wa. Instructed on turning q2h. Instructed on mri. Instructed on fall risk and precautions. Instructed to call for assist oob. No distress noted. Awaiting mri. Neurosurgery conulted.

## 2019-02-21 NOTE — HOSPITAL COURSE
The patient was placed in Observation for complaints of an approximate 4 week history of right sided weakness, right sided numbness, decreased right upper extremity fine motor skills and falls as well as a 2 week history of primarily left sided low back pain. On 2/21/19, the patient reported that the pain sometimes travels to his right side. He denies symptoms of cord compression including incontinence and saddle anesthesia. CT scan of the head, MRI of the brain, carotid US and echocardiogram were essentially negative. However, MRI of the lumbar spine was significant for multilevel degenerative disc disease with facet arthritis resulting in central and high-grade foraminal stenosis of L2-3, L3-4 and L4-5 with possible nerve root impingement. Neurosurgery was consulted and recommended further imaging with MRI of the cervical spine. The patient was evaluated by PT and OT who recommended a rolling walker and outpatient PT and OT.  Case reviewed with Dr Wolfe, CT of cervical spine and Chest X ray order for today. MRI of cervical spine shows stenosis with cord signal change its subsequent myelomalacia at C3-4, C4-5, stenosis at 5 6 as well,  retrolisthesis at C3-4. Due to pt weakness, Dr Wolfe recommend surgical intervention. He will follow up on Thursday February 28th for surgery. Reviewed follow up surgery with patient and wife. Patient was seen, examined, and deemed suitable for discharge.

## 2019-02-21 NOTE — PT/OT/SLP PROGRESS
Physical Therapy  Treatment    Brett Nugent   MRN: [id number]   Admitting Diagnosis: Right sided weakness    PT Received On: 02/21/19  PT Start Time: 0730     PT Stop Time: 0755    PT Total Time (min): 25 min       Billable Minutes:  Gait Training 15 and Therapeutic Exercise 10    Treatment Type: Treatment  PT/PTA: PT       General Precautions: Standard, fall  Orthopedic Precautions: N/A   Braces: N/A    Subjective:  Communicated with NURSE JUAREZ prior to session.  Pain/Comfort  Pain Rating 1: 0/10    Objective:   Patient found with: telemetry    Functional Mobility:  Therapeutic Activities and Exercises:  PT FOUND SUPINE IN BED UPON ARRIVAL, AGREEABLE TO P.T. , EAGER TO WALK, SBA FOR SUP>SIT TF, SBA FOR SIT>STAND TF, REVIEW RW USE AND SAFETY DURING TF'S AND GAIT, PT ' WITH RW AND SBA, SLOW PACED GAIT, RLE QUICK TO FATIGUE SO PT REPORTS MINIMAL BUCKLING, NO GROSS LOB, F+ DYNAMIC BALANCE, PT RETURN TO ROOM TO BEDSIDE CHAIR, PT EDUCATED IN AND PERFORMED BLE THEREX X 20 REPS AROM WITH REST, PT ENCOURAGED TO PERFORM THEREX THROUGHOUT THE DAY, PT AGREEABLE    AM-PAC 6 CLICK MOBILITY  How much help from another person does this patient currently need?   1 = Unable, Total/Dependent Assistance  2 = A lot, Maximum/Moderate Assistance  3 = A little, Minimum/Contact Guard/Supervision  4 = None, Modified [place]/Independent    Turning over in bed (including adjusting bedclothes, sheets and blankets)?: 4  Sitting down on and standing up from a chair with arms (e.g., wheelchair, bedside commode, etc.): 4  Moving from lying on back to sitting on the side of the bed?: 4  Moving to and from a bed to a chair (including a wheelchair)?: 4  Need to walk in hospital room?: 4  Climbing 3-5 steps with a railing?: 1  Basic Mobility Total Score: 21    AM-PAC Raw Score CMS G-Code Modifier Level of Impairment Assistance   6 % Total / Unable   7 - 9 CM 80 - 100% Maximal Assist   10 - 14 CL 60 - 80% Moderate Assist   15 - 19  CK 40 - 60% Moderate Assist   20 - 22 CJ 20 - 40% Minimal Assist   23 CI 1-20% SBA / CGA   24 CH 0% Independent/ Mod I     Patient left up in chair with all lines intact, call button in reach and NURSE notified.    Assessment:  Brett Nugent is a 68 y.o. male with a medical diagnosis of Right sided weakness and presents with IMPAIRED FUNCTIONAL MOBILITY. PT WILL BENEFIT FROM CONT. SKILLED P.T. TO ADDRESS IMPAIRMENTS    Rehab identified problem list/impairments: Rehab identified problem list/impairments: weakness, impaired endurance, impaired functional mobilty, gait instability, impaired balance, decreased coordination, decreased safety awareness, decreased lower extremity function    Rehab potential is good.    Activity tolerance: Good    Discharge recommendations: Discharge Facility/Level of Care Needs: other (see comments)(OUTPATIENT P.T.)     Barriers to discharge:      Equipment recommendations: Equipment Needed After Discharge: walker, rolling     GOALS:   Multidisciplinary Problems     Physical Therapy Goals        Problem: Physical Therapy Goal    Goal Priority Disciplines Outcome Goal Variances Interventions   Physical Therapy Goal     PT, PT/OT Ongoing (interventions implemented as appropriate)     Description:  LTGs to be met by 2/27/19  1. Pt will perform bed mobility with Mod I  2. Pt will perform sit<>stand t/fs with Supervision  3. Pt will ambulate 200ft with/ without AD with Supervision  4. Pt will demo Good dynamic balance during ambulation                    PLAN:    Patient to be seen 5 x/week  to address the above listed problems via gait training, therapeutic activities, therapeutic exercises  Plan of Care expires: 02/27/19  Plan of Care reviewed with: patient    PT ENCOURAGED TO CALL FOR ASSISTANCE WITH ALL NEEDS DUE TO FALL RISK STATUS, PT AGREEABLE    Shu Ledesma, PT  02/21/2019

## 2019-02-21 NOTE — PT/OT/SLP PROGRESS
Occupational Therapy  Treatment    Brett Nugent   MRN: [id number]   Admitting Diagnosis: Right sided weakness    OT Date of Treatment: 02/21/19   OT Start Time: 1445  OT Stop Time: 1515  OT Total Time (min): 30 min    Billable Minutes:  Therapeutic Activity 20 minutes and Therapeutic Exercise 10 minutes    General Precautions: Standard, fall  Orthopedic Precautions: N/A  Braces: N/A         Subjective:  Communicated with nurse Carver and epic chart review prior to session.    Pain/Comfort  Pain Rating 1: 0/10    Objective:  Patient found with: telemetry     Functional Mobility:  Bed Mobility:na       Transfers:   mod (I) with rw    Functional Ambulation: pt ambulated approx 250 feet with slight right side weakness with right knee buckling at times , however , pt is able to self correct.    Activities of Daily Living:     Feeding adaptive equipment: na     UE adaptive equipment: s     LE adaptive equipment: s     Bathing adaptive equipment: na  Balance:   Static Sit: GOOD: Takes MODERATE challenges from all directions  Dynamic Sit: GOOD: Maintains balance through MODERATE excursions of active trunk movement  Static Stand: FAIR+: Takes MINIMAL challenges from all directions  Dynamic stand: FAIR+: Needs CLOSE SUPERVISION during gait and is able to right self with minor LOB    Therapeutic Activities and Exercises:  Pt seen in room sitting on sofa. Pt req mod (I) with functional sit<>stand t/f's and pt ambulated 250 feet with rw and s . Pt with right knee buckling . Pt educated hep. Pt performed 1 set x 20 reps b ue rom exercise in all available planes and ranges seated in bed side chair. Pt s with chong /doff socks with s. Pt left sitting in bed side chair with all needs and call button in reach and nurse notified.     AM-PAC 6 CLICK ADL   How much help from another person does this patient currently need?   1 = Unable, Total/Dependent Assistance  2 = A lot, Maximum/Moderate Assistance  3 = A little, Minimum/Contact  "Guard/Supervision  4 = None, Modified [place]/Independent    Putting on and taking off regular lower body clothing? : 4  Bathing (including washing, rinsing, drying)?: 3  Toileting, which includes using toilet, bedpan, or urinal? : 4  Putting on and taking off regular upper body clothing?: 4  Taking care of personal grooming such as brushing teeth?: 4  Eating meals?: 4  Daily Activity Total Score: 23     AM-PAC Raw Score CMS "G-Code Modifier Level of Impairment Assistance   6 % Total / Unable   7 - 8 CM 80 - 100% Maximal Assist   9-13 CL 60 - 80% Moderate Assist   14 - 19 CK 40 - 60% Moderate Assist   20 - 22 CJ 20 - 40% Minimal Assist   23 CI 1-20% SBA / CGA   24 CH 0% Independent/ Mod I       Patient left up in chair with all lines intact, call button in reach and nurse notified    ASSESSMENT:  Brett Nugent is a 68 y.o. male with a medical diagnosis of Right sided weakness and presents with s to mod (I) with functional mobility and simple adl's. Pt discharged from skilled ot and placed on people 's program..    Rehab identified problem list/impairments:      Rehab potential is good.    Activity tolerance: Good    Discharge recommendations: Discharge Facility/Level of Care Needs: (out patient PT)     Barriers to discharge: Barriers to Discharge: None    Equipment recommendations: none     GOALS:   Multidisciplinary Problems     Occupational Therapy Goals        Problem: Occupational Therapy Goal    Goal Priority Disciplines Outcome Interventions   Occupational Therapy Goal     OT, PT/OT     Description:  LTGs to be met by 2/27/19  1. Pt will perform bed mobility with Mod I  2. Pt will perform commode t/fs with Supervision  3. Pt will perform LE dressing with Supervision  4. Pt tolerate (B) UE ROM exercises 1 x 20 reps                    Plan:  Patient to be seen   to address the above listed problems via    Plan of Care expires: 02/21/19  Plan of Care reviewed with: patient         Ivy " Den, OT  02/21/2019

## 2019-02-21 NOTE — ASSESSMENT & PLAN NOTE
-Acute CVA and TIA ruled out.   -Carotid US, 2D echocardiogram essentially negative.   -Lipid panel showed LDL at goal.   -Continue PT and OT.   -Follow up MRI of the cervical spine.   -Neurosurgery following.

## 2019-02-21 NOTE — PROGRESS NOTES
avtar Williamson in room to see pt. Instructed that pt rt pupil slightly larger. But equal and reactive.

## 2019-02-21 NOTE — CONSULTS
DME: Rolling walker delivered to bedside    OP PT and OT: CM met with patient and patient would like [facility]. CM contacted facility and was advised an order is needed to schedule patient for therapy. Patient will need to call facility after d/c to schedule PT appointment.     CM made request to LUCILA Vera via Secure Chat.

## 2019-02-21 NOTE — SUBJECTIVE & OBJECTIVE
Interval History: The patient denies change in symptoms. Discussed carotid US, echocardiogram, MRI of the brain and MRI of the lumbar spine results.     Review of Systems   Constitutional: Negative for appetite change, chills, diaphoresis, fatigue and fever.   HENT: Negative for congestion, ear pain, mouth sores, sore throat and trouble swallowing.    Eyes: Negative for pain and visual disturbance.   Respiratory: Negative for cough, chest tightness and shortness of breath.    Cardiovascular: Negative for chest pain, palpitations and leg swelling.   Gastrointestinal: Negative for abdominal pain, constipation, diarrhea and nausea.   Endocrine: Negative for cold intolerance, heat intolerance, polydipsia and polyuria.   Genitourinary: Negative for dysuria, frequency and hematuria.   Musculoskeletal: Positive for back pain (lower left). Negative for arthralgias, myalgias and neck pain.   Skin: Negative for pallor, rash and wound.   Allergic/Immunologic: Negative for environmental allergies and immunocompromised state.   Neurological: Positive for weakness (right upper and lower extremity) and numbness. Negative for dizziness, seizures, syncope and headaches.        Positive for falls and changes in equilibrium.    Hematological: Negative for adenopathy. Does not bruise/bleed easily.   Psychiatric/Behavioral: Negative for agitation, confusion and sleep disturbance.     Objective:     Vital Signs (Most Recent):  Temp: 98.9 °F (37.2 °C) (02/21/19 1630)  Pulse: 61 (02/21/19 1630)  Resp: 18 (02/21/19 1630)  BP: 127/80 (02/21/19 1630)  SpO2: 95 % (02/21/19 1630) Vital Signs (24h Range):  Temp:  [97.7 °F (36.5 °C)-98.9 °F (37.2 °C)] 98.9 °F (37.2 °C)  Pulse:  [56-80] 61  Resp:  [15-20] 18  SpO2:  [94 %-97 %] 95 %  BP: (122-146)/(72-84) 127/80     Weight: 78.3 kg (172 lb 9.9 oz)  Body mass index is 22.16 kg/m².    Intake/Output Summary (Last 24 hours) at 2/21/2019 1658  Last data filed at 2/21/2019 0436  Gross per 24 hour    Intake 300 ml   Output --   Net 300 ml      Physical Exam   Constitutional: He is oriented to person, place, and time. He appears well-developed and well-nourished.   HENT:   Head: Normocephalic and atraumatic.   Eyes: Conjunctivae are normal.   Neck: Neck supple. No JVD present.   Cardiovascular: Normal rate, regular rhythm and normal heart sounds.   Pulmonary/Chest: Effort normal and breath sounds normal. He has no wheezes.   Abdominal: Soft. Bowel sounds are normal. He exhibits no distension. There is no tenderness.   Musculoskeletal: Normal range of motion.   Left 2nd distal finger deformity consistent with paronychia.    Neurological: He is alert and oriented to person, place, and time.   Slight decreased strength right upper extremity.    Skin: Skin is warm and dry. No rash noted.   Psychiatric: He has a normal mood and affect. His behavior is normal. Thought content normal.   Nursing note and vitals reviewed.      Significant Labs:   CBC:   Recent Labs   Lab 02/20/19  0934 02/21/19  0359   WBC 10.77 6.83   HGB 14.8 14.0   HCT 45.6 43.6    181     CMP:   Recent Labs   Lab 02/20/19  0934 02/21/19  0359    141   K 4.3 4.1    107   CO2 27 27   GLU 86 95   BUN 29* 23   CREATININE 0.8 0.8   CALCIUM 9.9 8.7   PROT 7.1  --    ALBUMIN 3.8  --    BILITOT 0.6  --    ALKPHOS 51*  --    AST 17  --    ALT 12  --    ANIONGAP 11 7*   EGFRNONAA >60 >60     All pertinent labs within the past 24 hours have been reviewed.    Significant Imaging: I have reviewed all pertinent imaging results/findings within the past 24 hours.

## 2019-02-21 NOTE — PROGRESS NOTES
[facility] Hospital Medicine  Progress Note    Patient Name: Brett Nugent  MRN: [id number]  Patient Class: OP- Observation   Admission Date: 2/20/2019  Length of Stay: 0 days  Attending Physician: Jesse Pereira MD  Primary Care Provider: Leonardo Khan MD        Subjective:     Principal Problem:Right sided weakness    HPI:  Brett Nugent is a 68 year old male with hyperlipidemia who presented to the ED after a fall at home last night. The patient has multiple complaints including right sided weakness, right sided numbness and decreased right upper extremity fine motor skills that began approximately 1 month ago. In the last two weeks, the patient also notes left lumbar spine pain with associated left lower extremity pain. Additionally, he reports several months of balance difficulties. Despite all of these complaints, he has only experienced 3 falls within the last month. He denies confusion, changes in speech, difficulty swallowing as well as bowel and bladder incontinence. Code status was discussed with the patient and his wife. He is a DNR. His wife, Laura Nugent, is his surrogate medical decision maker.     Hospital Course:  The patient was placed in Observation for complaints of an approximate 4 week history of right sided weakness, right sided numbness, decreased right upper extremity fine motor skills and falls as well as a 2 week history of primarily left sided low back pain. On 2/21/19, the patient reported that the pain sometimes travels to his right side. He denies symptoms of cord compression including incontinence and saddle anesthesia. CT scan of the head, MRI of the brain, carotid US and echocardiogram were essentially negative. However, MRI of the lumbar spine was significant for multilevel degenerative disc disease with facet arthritis resulting in central and high-grade foraminal stenosis of L2-3, L3-4 and L4-5 with possible nerve root impingement. Neurosurgery was consulted  and recommended further imaging with MRI of the cervical spine. The patient was evaluated by PT and OT who recommended a rolling walker and outpatient PT and OT.      Interval History: The patient denies change in symptoms. Discussed carotid US, echocardiogram, MRI of the brain and MRI of the lumbar spine results.     Review of Systems   Constitutional: Negative for appetite change, chills, diaphoresis, fatigue and fever.   HENT: Negative for congestion, ear pain, mouth sores, sore throat and trouble swallowing.    Eyes: Negative for pain and visual disturbance.   Respiratory: Negative for cough, chest tightness and shortness of breath.    Cardiovascular: Negative for chest pain, palpitations and leg swelling.   Gastrointestinal: Negative for abdominal pain, constipation, diarrhea and nausea.   Endocrine: Negative for cold intolerance, heat intolerance, polydipsia and polyuria.   Genitourinary: Negative for dysuria, frequency and hematuria.   Musculoskeletal: Positive for back pain (lower left). Negative for arthralgias, myalgias and neck pain.   Skin: Negative for pallor, rash and wound.   Allergic/Immunologic: Negative for environmental allergies and immunocompromised state.   Neurological: Positive for weakness (right upper and lower extremity) and numbness. Negative for dizziness, seizures, syncope and headaches.        Positive for falls and changes in equilibrium.    Hematological: Negative for adenopathy. Does not bruise/bleed easily.   Psychiatric/Behavioral: Negative for agitation, confusion and sleep disturbance.     Objective:     Vital Signs (Most Recent):  Temp: 98.9 °F (37.2 °C) (02/21/19 1630)  Pulse: 61 (02/21/19 1630)  Resp: 18 (02/21/19 1630)  BP: 127/80 (02/21/19 1630)  SpO2: 95 % (02/21/19 1630) Vital Signs (24h Range):  Temp:  [97.7 °F (36.5 °C)-98.9 °F (37.2 °C)] 98.9 °F (37.2 °C)  Pulse:  [56-80] 61  Resp:  [15-20] 18  SpO2:  [94 %-97 %] 95 %  BP: (122-146)/(72-84) 127/80     Weight: 78.3 kg  (172 lb 9.9 oz)  Body mass index is 22.16 kg/m².    Intake/Output Summary (Last 24 hours) at 2/21/2019 1651  Last data filed at 2/21/2019 0436  Gross per 24 hour   Intake 300 ml   Output --   Net 300 ml      Physical Exam   Constitutional: He is oriented to person, place, and time. He appears well-developed and well-nourished.   HENT:   Head: Normocephalic and atraumatic.   Eyes: Conjunctivae are normal.   Neck: Neck supple. No JVD present.   Cardiovascular: Normal rate, regular rhythm and normal heart sounds.   Pulmonary/Chest: Effort normal and breath sounds normal. He has no wheezes.   Abdominal: Soft. Bowel sounds are normal. He exhibits no distension. There is no tenderness.   Musculoskeletal: Normal range of motion.   Left 2nd distal finger deformity consistent with paronychia.    Neurological: He is alert and oriented to person, place, and time.   Slight decreased strength right upper extremity.    Skin: Skin is warm and dry. No rash noted.   Psychiatric: He has a normal mood and affect. His behavior is normal. Thought content normal.   Nursing note and vitals reviewed.      Significant Labs:   CBC:   Recent Labs   Lab 02/20/19  0934 02/21/19  0359   WBC 10.77 6.83   HGB 14.8 14.0   HCT 45.6 43.6    181     CMP:   Recent Labs   Lab 02/20/19  0934 02/21/19  0359    141   K 4.3 4.1    107   CO2 27 27   GLU 86 95   BUN 29* 23   CREATININE 0.8 0.8   CALCIUM 9.9 8.7   PROT 7.1  --    ALBUMIN 3.8  --    BILITOT 0.6  --    ALKPHOS 51*  --    AST 17  --    ALT 12  --    ANIONGAP 11 7*   EGFRNONAA >60 >60     All pertinent labs within the past 24 hours have been reviewed.    Significant Imaging: I have reviewed all pertinent imaging results/findings within the past 24 hours.    Assessment/Plan:      * Right sided weakness    -Acute CVA and TIA ruled out.   -Carotid US, 2D echocardiogram essentially negative.   -Lipid panel showed LDL at goal.   -Continue PT and OT.   -Follow up MRI of the cervical  spine.   -Neurosurgery following.         Lumbar stenosis    -No signs of cord compression.   -Neurosurgery following.   -Continue PT and OT.        Paronychia of finger, left    Continue Keflex.        Pure hypercholesterolemia    Continue statin.          VTE Risk Mitigation (From admission, onward)        Ordered     enoxaparin injection 40 mg  Daily      02/20/19 1323     IP VTE LOW RISK PATIENT  Once      02/20/19 1323              LUCILA Vera  Department of Hospital Medicine   [facility]

## 2019-02-21 NOTE — PLAN OF CARE
Problem: Adult Inpatient Plan of Care  Goal: Patient-Specific Goal (Individualization)  Outcome: Ongoing (interventions implemented as appropriate)  Pt in bed AAOx4. Plan of Care reviewed, Verbalized understanding.   Patient remained free from falls, fall precautions in place.  Patient is NSR on monitor.VSS.  Wife at the bedside.  Call bell and personal belongings within reach. Hourly rounding complete. Reminded to call for assistance. No complaints at this time. Will continue to monitor.

## 2019-02-21 NOTE — PLAN OF CARE
Problem: Occupational Therapy Goal  Goal: Occupational Therapy Goal  LTGs to be met by 2/27/19  1. Pt will perform bed mobility with Mod I  2. Pt will perform commode t/fs with Supervision  3. Pt will perform LE dressing with Supervision  4. Pt tolerate (B) UE ROM exercises 1 x 20 reps   Pt has met ltg# 2,3,4. Pt discharged from skilled ot and placed on people 's program

## 2019-02-21 NOTE — PLAN OF CARE
Problem: Physical Therapy Goal  Goal: Physical Therapy Goal  LTGs to be met by 2/27/19  1. Pt will perform bed mobility with Mod I  2. Pt will perform sit<>stand t/fs with Supervision  3. Pt will ambulate 200ft with/ without AD with Supervision  4. Pt will demo Good dynamic balance during ambulation   Outcome: Ongoing (interventions implemented as appropriate)  PT WITH GOOD PARTICIPATION, SBA FOR BED MOBILITY AND TF'S, SBA FOR GAIT 200' WITH RW

## 2019-02-22 ENCOUNTER — HOSPITAL ENCOUNTER (OUTPATIENT)
Dept: RADIOLOGY | Facility: HOSPITAL | Age: 69
Discharge: HOME | End: 2019-02-22
Attending: EMERGENCY MEDICINE
Payer: MEDICARE

## 2019-02-22 VITALS
HEART RATE: 62 BPM | HEIGHT: 74 IN | SYSTOLIC BLOOD PRESSURE: 120 MMHG | RESPIRATION RATE: 17 BRPM | BODY MASS INDEX: 19.65 KG/M2 | DIASTOLIC BLOOD PRESSURE: 81 MMHG | TEMPERATURE: 98 F | WEIGHT: 153.13 LBS | OXYGEN SATURATION: 98 %

## 2019-02-22 PROBLEM — L03.012 PARONYCHIA OF FINGER, LEFT: Status: RESOLVED | Noted: 2019-02-20 | Resolved: 2019-02-22

## 2019-02-22 PROBLEM — M50.30 DEGENERATIVE DISC DISEASE, CERVICAL: Status: ACTIVE | Noted: 2019-02-22

## 2019-02-22 LAB
ANION GAP SERPL CALC-SCNC: 9 MMOL/L
BASOPHILS # BLD AUTO: 0.02 K/UL
BASOPHILS NFR BLD: 0.4 %
BUN SERPL-MCNC: 22 MG/DL
CALCIUM SERPL-MCNC: 8.9 MG/DL
CHLORIDE SERPL-SCNC: 106 MMOL/L
CO2 SERPL-SCNC: 28 MMOL/L
CREAT SERPL-MCNC: 0.8 MG/DL
DIFFERENTIAL METHOD: ABNORMAL
EOSINOPHIL # BLD AUTO: 0.2 K/UL
EOSINOPHIL NFR BLD: 4.6 %
ERYTHROCYTE [DISTWIDTH] IN BLOOD BY AUTOMATED COUNT: 14.4 %
EST. GFR  (AFRICAN AMERICAN): >60 ML/MIN/1.73 M^2
EST. GFR  (NON AFRICAN AMERICAN): >60 ML/MIN/1.73 M^2
GLUCOSE SERPL-MCNC: 92 MG/DL
HCT VFR BLD AUTO: 40.6 %
HGB BLD-MCNC: 13.2 G/DL
LYMPHOCYTES # BLD AUTO: 1.6 K/UL
LYMPHOCYTES NFR BLD: 33.5 %
MAGNESIUM SERPL-MCNC: 2.4 MG/DL
MCH RBC QN AUTO: 29.9 PG
MCHC RBC AUTO-ENTMCNC: 32.5 G/DL
MCV RBC AUTO: 92 FL
MONOCYTES # BLD AUTO: 0.5 K/UL
MONOCYTES NFR BLD: 10 %
NEUTROPHILS # BLD AUTO: 2.5 K/UL
NEUTROPHILS NFR BLD: 51.5 %
PHOSPHATE SERPL-MCNC: 2.9 MG/DL
PLATELET # BLD AUTO: 173 K/UL
PMV BLD AUTO: 9.5 FL
POTASSIUM SERPL-SCNC: 4.4 MMOL/L
RBC # BLD AUTO: 4.41 M/UL
SODIUM SERPL-SCNC: 143 MMOL/L
WBC # BLD AUTO: 4.81 K/UL

## 2019-02-22 PROCEDURE — G0378 HOSPITAL OBSERVATION PER HR: HCPCS

## 2019-02-22 PROCEDURE — 99214 PR OFFICE/OUTPT VISIT, EST, LEVL IV, 30-39 MIN: ICD-10-PCS | Mod: ,,, | Performed by: NEUROLOGICAL SURGERY

## 2019-02-22 PROCEDURE — 99214 OFFICE O/P EST MOD 30 MIN: CPT | Mod: ,,, | Performed by: NEUROLOGICAL SURGERY

## 2019-02-22 PROCEDURE — 71045 X-RAY EXAM CHEST 1 VIEW: CPT | Mod: TC

## 2019-02-22 PROCEDURE — 25000003 PHARM REV CODE 250: Performed by: INTERNAL MEDICINE

## 2019-02-22 PROCEDURE — 36415 COLL VENOUS BLD VENIPUNCTURE: CPT

## 2019-02-22 PROCEDURE — 97116 GAIT TRAINING THERAPY: CPT

## 2019-02-22 PROCEDURE — 83735 ASSAY OF MAGNESIUM: CPT

## 2019-02-22 PROCEDURE — 97110 THERAPEUTIC EXERCISES: CPT

## 2019-02-22 PROCEDURE — 80048 BASIC METABOLIC PNL TOTAL CA: CPT

## 2019-02-22 PROCEDURE — 25000003 PHARM REV CODE 250: Performed by: PHYSICIAN ASSISTANT

## 2019-02-22 PROCEDURE — 84100 ASSAY OF PHOSPHORUS: CPT

## 2019-02-22 PROCEDURE — 85025 COMPLETE CBC W/AUTO DIFF WBC: CPT

## 2019-02-22 RX ADMIN — ASPIRIN 81 MG: 81 TABLET, COATED ORAL at 08:02

## 2019-02-22 RX ADMIN — CEPHALEXIN 500 MG: 500 CAPSULE ORAL at 05:02

## 2019-02-22 RX ADMIN — PANTOPRAZOLE SODIUM 40 MG: 40 TABLET, DELAYED RELEASE ORAL at 08:02

## 2019-02-22 NOTE — H&P (VIEW-ONLY)
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  MRI shows stenosis with cord signal change its subsequent myelomalacia at C3-4, C4-5.  There is also stenosis at 5 6 as well.  There is a retrolisthesis at C3-4.  Because the patient is weak I do recommend surgical intervention.  I do think that the patient could be done on an outpatient basis.  Patient wants surgery sooner rather than later will schedule surgery for Thursday February 28th.  We will finish the preoperative laboratory values needed today and will schedule for surgery accordingly.  Patient will have a x-ray as well as a CT scan of the cervical spine for anatomy review.  And will bring the patient back for surgical procedure on Thursday as an outpatient.  We did not sign consents today however the patient is aware of all risks and benefits including pain infection bleeding, paralysis and death failure of the operation to relieve it all symptoms in the need for surgery in the future.  All questions were answered and will provide information about procedures in his AVS on discharge.    Thank you for the consult  Please call with any questions    Helio Wolfe MD  Neurosurgery                 Interval History:  Patient is seen today for follow-up with his MRI of the cervical spine for review.  Patient was having upper as well as lower extremity symptoms as well as difficulty with balance.  He is ambulating with a rolling walker this morning however the upper extremities still remained weak and he has difficulty with balance which is very concerning for him he was very active prior to this hospitalization.  States that he has numbness and tingling in the hands with radiation into the lower extremities.  He has back pain but the upper extremity symptoms and balance issues are his main complaint this morning.  He has been having difficulty with dexterity and fine motor tasks over the past several  weeks.    Medications:  Continuous Infusions:  Scheduled Meds:   aspirin  81 mg Oral Daily    cephALEXin  500 mg Oral Q8H    enoxaparin  40 mg Subcutaneous Daily    pantoprazole  40 mg Oral Daily    simvastatin  40 mg Oral QHS     PRN Meds:acetaminophen, ALPRAZolam, HYDROcodone-acetaminophen, lorazepam, ondansetron, sodium chloride 0.9%     Review of Systems   Constitutional: Negative for activity change, appetite change and chills.   HENT: Negative for congestion and ear pain.    Eyes: Negative for photophobia, redness and visual disturbance.   Respiratory: Negative for apnea and chest tightness.    Cardiovascular: Negative for chest pain.   Gastrointestinal: Negative for abdominal distention and abdominal pain.   Endocrine: Negative for cold intolerance.   Genitourinary: Negative for difficulty urinating.   Musculoskeletal: Positive for myalgias, neck pain and neck stiffness. Negative for arthralgias.   Skin: Negative for color change.   Allergic/Immunologic: Negative for environmental allergies.   Neurological: Positive for weakness and numbness. Negative for dizziness.   Hematological: Negative for adenopathy. Does not bruise/bleed easily.   Psychiatric/Behavioral: Negative for agitation, behavioral problems and confusion.     Objective:     Weight: 69.4 kg (153 lb 1.8 oz)  Body mass index is 19.66 kg/m².  Vital Signs (Most Recent):  Temp: 98.3 °F (36.8 °C) (02/22/19 1113)  Pulse: 62 (02/22/19 1130)  Resp: 17 (02/22/19 1113)  BP: 120/81 (02/22/19 1113)  SpO2: 98 % (02/22/19 1113) Vital Signs (24h Range):  Temp:  [97.4 °F (36.3 °C)-98.9 °F (37.2 °C)] 98.3 °F (36.8 °C)  Pulse:  [50-72] 62  Resp:  [16-18] 17  SpO2:  [94 %-98 %] 98 %  BP: (100-150)/(59-84) 120/81     Date 02/22/19 0700 - 02/23/19 0659   Shift 5931-4303 5012-2524 6005-0360 24 Hour Total   INTAKE   P.O. 594   594   Shift Total(mL/kg) 594(8.6)   594(8.6)   OUTPUT   Shift Total(mL/kg)       Weight (kg) 69.4 69.4 69.4 69.4                         Physical Exam:  Nursing note and vitals reviewed.    Constitutional: He appears well-developed and well-nourished. He is not diaphoretic. No distress.     Eyes: Pupils are equal, round, and reactive to light. EOM are normal.     Cardiovascular: Normal pulses, intact distal pulses, normal distal pulses, intact carotid pulses, normal carotid pulses and no edema.     Psych/Behavior: He is alert. He is oriented to person, place, and time. He has a normal mood and affect.     Musculoskeletal: Gait is abnormal.        Neck: Range of motion is limited. There is no tenderness. Muscle strength is 4/5. Tone is abnormal.        Back: Range of motion is limited. There is no tenderness. Muscle strength is 4/5. Tone is abnormal.        Right Upper Extremities: Range of motion is full. Muscle strength is 4/5. Tone is normal.        Left Upper Extremities: Range of motion is full. There is tenderness. Muscle strength is 4/5. Tone is normal.       Right Lower Extremities: Range of motion is full. There is no tenderness. Muscle strength is 4/5. Tone is normal.        Left Lower Extremities: Range of motion is full. There is no tenderness. Muscle strength is 4/5. Tone is normal.     Neurological:        Coordination: He has abnormal heel to shin coordination.        Sensory: There is (bilateral upper extremities) sensory deficit in the extremities.        DTRs: DTRs are DTRS NORMAL AND SYMMETRICnormal and symmetric. Tricep reflexes are 2+ on the right side and 2+ on the left side. Bicep reflexes are 2+ on the right side and 2+ on the left side. Brachioradialis reflexes are 2+ on the right side and 2+ on the left side. Patellar reflexes are 2+ on the right side and 2+ on the left side. Achilles reflexes are 2+ on the right side and 2+ on the left side. He displays Babinski's sign on the right side. He displays Babinski's sign on the left side.        Cranial nerves: Cranial nerve(s) II, III, IV, V, VI, VII, VIII, IX, X, XI and XII  are intact.       Significant Labs:  Recent Labs   Lab 02/21/19  0359 02/22/19  0353   GLU 95 92    143   K 4.1 4.4    106   CO2 27 28   BUN 23 22   CREATININE 0.8 0.8   CALCIUM 8.7 8.9   MG 2.0 2.4     Recent Labs   Lab 02/21/19  0359 02/22/19  0353   WBC 6.83 4.81   HGB 14.0 13.2*   HCT 43.6 40.6    173     No results for input(s): LABPT, INR, APTT in the last 48 hours.  Microbiology Results (last 7 days)     ** No results found for the last 168 hours. **        All pertinent labs from the last 24 hours have been reviewed.    Significant Diagnostics:  Multilevel advanced degenerative disc disease, spondylosis, and facet arthropathy.  Grade 1 spondylolisthesis with 4 mm anterolisthesis C4 on C5.  Multilevel ligamentum flavum buckling.  There is subsequent multilevel advanced central spinal canal stenosis at C3-4 (7 mm) and more notably C4-5 (5-6 mm) with subsequent effacement of the CSF signal with mild flattening deformity of the cervical spinal cord.  However, there is no abnormal spinal cord signal.  There is also multilevel scattered moderate to advanced neural foraminal stenosis, detailed above.  There is straightening of the of the lower cervical spine.  Degenerative sub endplate marrow edema posterior C4-5.  Negative for occult cervical spine fracture.

## 2019-02-22 NOTE — PT/OT/SLP PROGRESS
Physical Therapy  Treatment    Brett Nugent   MRN: [id number]   Admitting Diagnosis: Right sided weakness    PT Received On: 02/22/19  PT Start Time: 0810     PT Stop Time: 0835    PT Total Time (min): 25 min       Billable Minutes:  Gait Training 15 and Therapeutic Exercise 10    Treatment Type: Treatment  PT/PTA: PT       General Precautions: Standard  Orthopedic Precautions: N/A   Braces: N/A    Subjective:  Communicated with NURSE ROUSE prior to session.  Pain/Comfort  Pain Rating 1: 0/10    Objective:   Patient found with: telemetry    Functional Mobility:  Therapeutic Activities and Exercises:  PT FOUND SUPINE IN BED UPON ARRIVAL, AGREEABLE TO P.T. , EAGER TO WALK, SPV FOR SUP>SIT TF, SPV FOR SIT>STAND TF, REVIEW RW USE AND SAFETY DURING TF'S AND GAIT, MEASURE PT'S NEW RW TO HIS HT, PT ' WITH RW AND SPV, G DYNAMIC BALANCE, PT RETURN TO ROOM TO BEDSIDE CHAIR, PT EDUCATED IN AND PERFORMED BLE THEREX X 20 REPS AROM WITH REST, PT ENCOURAGED TO PERFORM THEREX THROUGHOUT THE DAY, PT AGREEABLE    AM-PAC 6 CLICK MOBILITY  How much help from another person does this patient currently need?   1 = Unable, Total/Dependent Assistance  2 = A lot, Maximum/Moderate Assistance  3 = A little, Minimum/Contact Guard/Supervision  4 = None, Modified [place]/Independent    Turning over in bed (including adjusting bedclothes, sheets and blankets)?: 4  Sitting down on and standing up from a chair with arms (e.g., wheelchair, bedside commode, etc.): 4  Moving from lying on back to sitting on the side of the bed?: 4  Moving to and from a bed to a chair (including a wheelchair)?: 4  Need to walk in hospital room?: 4  Climbing 3-5 steps with a railing?: 1  Basic Mobility Total Score: 21    AM-PAC Raw Score CMS G-Code Modifier Level of Impairment Assistance   6 % Total / Unable   7 - 9 CM 80 - 100% Maximal Assist   10 - 14 CL 60 - 80% Moderate Assist   15 - 19 CK 40 - 60% Moderate Assist   20 - 22 CJ 20 - 40% Minimal  Assist   23 CI 1-20% SBA / CGA   24 CH 0% Independent/ Mod I     Patient left up in chair with all lines intact, call button in reach, NURSE notified and WIFE present.    Assessment:  Brett Nugent is a 68 y.o. male with a medical diagnos.  PT IS NO LONGER A CANDIDATE FOR INPATIENT SKILLED P.T. DUE TO HIGH LEVEL OF FUNCTION, PT WILL BENEFIT FROM PEOPLE [facility] PROGRAM FOR CONT. GAIT/TE    Rehab identified problem list/impairments:     Rehab potential is     Activity tolerance:     Discharge recommendations: Discharge Facility/Level of Care Needs: other (see comments)(OUTPT P.T.)     Barriers to discharge:     Equipment recommendations: Equipment Needed After Discharge: walker, rolling     GOALS:   Multidisciplinary Problems     Physical Therapy Goals     Not on file          Multidisciplinary Problems (Resolved)        Problem: Physical Therapy Goal    Goal Priority Disciplines Outcome Goal Variances Interventions   Physical Therapy Goal   (Resolved)     PT, PT/OT Outcome(s) achieved     Description:  LTGs to be met by 2/27/19  1. Pt will perform bed mobility with Mod I  2. Pt will perform sit<>stand t/fs with Supervision  3. Pt will ambulate 200ft with/ without AD with Supervision  4. Pt will demo Good dynamic balance during ambulation                    PLAN:    D/C PT FROM P.T. SERVICES TO [facility] PROGRAM     Shu Ledesma, PT  02/22/2019

## 2019-02-22 NOTE — DISCHARGE SUMMARY
[facility] Hospital Medicine  Discharge Summary      Patient Name: Brett Nugent  MRN: [id number]  Admission Date: 2/20/2019  Hospital Length of Stay: 0 days  Discharge Date and Time: 2/22/2019  3:08 PM  Attending Physician: Jesse Pereira MD   Discharging Provider: Boo Landrum NP  Primary Care Provider: Leonardo Khan MD      HPI:   Brett Nugent is a 68 year old male with hyperlipidemia who presented to the ED after a fall at home last night. The patient has multiple complaints including right sided weakness, right sided numbness and decreased right upper extremity fine motor skills that began approximately 1 month ago. In the last two weeks, the patient also notes left lumbar spine pain with associated left lower extremity pain. Additionally, he reports several months of balance difficulties. Despite all of these complaints, he has only experienced 3 falls within the last month. He denies confusion, changes in speech, difficulty swallowing as well as bowel and bladder incontinence. Code status was discussed with the patient and his wife. He is a DNR. His wife, Laura Nugent, is his surrogate medical decision maker.     * No surgery found *      Hospital Course:   The patient was placed in Observation for complaints of an approximate 4 week history of right sided weakness, right sided numbness, decreased right upper extremity fine motor skills and falls as well as a 2 week history of primarily left sided low back pain. On 2/21/19, the patient reported that the pain sometimes travels to his right side. He denies symptoms of cord compression including incontinence and saddle anesthesia. CT scan of the head, MRI of the brain, carotid US and echocardiogram were essentially negative. However, MRI of the lumbar spine was significant for multilevel degenerative disc disease with facet arthritis resulting in central and high-grade foraminal stenosis of L2-3, L3-4 and L4-5 with possible nerve root  impingement. Neurosurgery was consulted and recommended further imaging with MRI of the cervical spine. The patient was evaluated by PT and OT who recommended a rolling walker and outpatient PT and OT.  Case reviewed with Dr Wolfe, CT of cervical spine and Chest X ray order for today. MRI of cervical spine shows stenosis with cord signal change its subsequent myelomalacia at C3-4, C4-5 , stenosis at 5 6 as well,  retrolisthesis at C3-4.  Due to pt weakness, Dr Wolfe recommend surgical intervention. He will follow up on Thursday February 28th for surgery. Reviewed follow up surgery with patient and wife. Patient was seen, examined, and deemed suitable for discharge.              Consults:   Consults (From admission, onward)        Status Ordering Provider     Inpatient consult to Neurosurgery  Once     Provider:  Helio Wolfe MD    Completed CAROL ANN CAMERON     Inpatient consult to Social Work  Once     Provider:  (Not yet assigned)    Completed CAROL ANN CAMERON     Inpatient consult to Social Work  Once     Provider:  (Not yet assigned)    Completed CAROL ANN CAMERON     Inpatient consult to Social Work  Once     Provider:  (Not yet assigned)    Completed MARK CASTILLO     IP consult to case management  Once     Provider:  (Not yet assigned)    Completed LANDRY MULTANI          No new Assessment & Plan notes have been filed under this hospital service since the last note was generated.  Service: Hospital Medicine    Final Active Diagnoses:    Diagnosis Date Noted POA    PRINCIPAL PROBLEM:  Degenerative disc disease, cervical [M50.30] 02/22/2019 Unknown    Right sided weakness [R53.1] 02/20/2019 Yes    Lumbar stenosis [M48.061] 02/20/2019 Yes    Pure hypercholesterolemia [E78.00] 09/08/2017 Yes      Problems Resolved During this Admission:    Diagnosis Date Noted Date Resolved POA    Paronychia of finger, left [L03.012] 02/20/2019 02/22/2019 Yes       Discharged Condition: stable    Disposition:  "Home-Health Care [facility]    Follow Up:  Follow-up Information     Leonardo Khan MD In 3 days.    Specialty:  Family Medicine  Contact information:  [address]  [address]  [place] [address]  [phone]             Neurology On 3/6/2019.           [facility] On 2/28/2019.    Specialty:  Emergency Medicine  Why:  Follow up for Surgery   Contact information:  [address]  [place] [address] [phone]           [facility].    Specialty:  Occupational Therapy  Why:  Orders given. Call to schedule.   Contact information:  [address]  [place] [place] [address]  [phone]                 Patient Instructions:      WALKER FOR HOME USE     Order Specific Question Answer Comments   Type of Walker: Adult (5'4"-6'6")    With wheels? Yes    Height: 6' 2" (1.88 m)    Weight: 78.3 kg (172 lb 9.9 oz)    Length of need (1-99 months): 99    Does patient have medical equipment at home? cane, straight    Please check all that apply: Patient's condition impairs ambulation.      Ambulatory Referral to Neurology   Referral Priority: Routine Referral Type: Consultation   Referral Reason: Specialty Services Required   Requested Specialty: Neurology   Number of Visits Requested: 1     Referral to OutPatient  Physical therapy   Referral Priority: Routine Referral Type: Physical Medicine   Referral Reason: Specialty Services Required   Requested Specialty: Physical Therapy   Number of Visits Requested: 1     Referral to Outpatient Occupational therapy   Referral Priority: Routine Referral Type: Occupational Therapy   Referral Reason: Specialty Services Required   Requested Specialty: Occupational Therapy   Number of Visits Requested: 1     Diet Cardiac     Diet Adult Regular     Notify your health care provider if you experience any of the following:  increased confusion or weakness     Activity as tolerated     Activity as tolerated       Significant " Diagnostic Studies: Labs:   CMP   Recent Labs   Lab 02/21/19  0359 02/22/19  0353    143   K 4.1 4.4    106   CO2 27 28   GLU 95 92   BUN 23 22   CREATININE 0.8 0.8   CALCIUM 8.7 8.9   ANIONGAP 7* 9   ESTGFRAFRICA >60 >60   EGFRNONAA >60 >60    and CBC   Recent Labs   Lab 02/21/19  0359 02/22/19  0353   WBC 6.83 4.81   HGB 14.0 13.2*   HCT 43.6 40.6    173       Pending Diagnostic Studies:     None         Medications:  Reconciled Home Medications:      Medication List      CONTINUE taking these medications    aspirin 81 MG EC tablet  Commonly known as:  ECOTRIN  Take 81 mg by mouth once daily.     cephALEXin 500 MG capsule  Commonly known as:  KEFLEX  Take 1 capsule (500 mg total) by mouth every 8 (eight) hours. for 10 days     cycloSPORINE 0.05 % ophthalmic emulsion  Commonly known as:  RESTASIS  Place 1 drop into both eyes 2 (two) times daily.     esomeprazole 40 MG capsule  Commonly known as:  NEXIUM  Take 1 capsule (40 mg total) by mouth once daily.     ORTHO DF 3,775 unit- 1 mg Cap  Generic drug:  vitamin D3-folic acid  TAKE ONE CAPSULE BY MOUTH TWICE DAILY     simvastatin 40 MG tablet  Commonly known as:  ZOCOR  Take 1 tablet (40 mg total) by mouth every evening.            Indwelling Lines/Drains at time of discharge:   Lines/Drains/Airways          None          Time spent on the discharge of patient: 45 minutes  Patient was seen and examined on the date of discharge and determined to be suitable for discharge.         Boo Landrum NP  Department of Hospital Medicine  [facility]

## 2019-02-22 NOTE — PLAN OF CARE
Problem: Physical Therapy Goal  Goal: Physical Therapy Goal  LTGs to be met by 2/27/19  1. Pt will perform bed mobility with Mod I  2. Pt will perform sit<>stand t/fs with Supervision  3. Pt will ambulate 200ft with/ without AD with Supervision  4. Pt will demo Good dynamic balance during ambulation   Outcome: Outcome(s) achieved Date Met: 02/22/19  D/C PT FROM P.T. SERVICES TO PEOPLE [facility] PROGRAM

## 2019-02-22 NOTE — PROGRESS NOTES
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  MRI shows stenosis with cord signal change its subsequent myelomalacia at C3-4, C4-5.  There is also stenosis at 5 6 as well.  There is a retrolisthesis at C3-4.  Because the patient is weak I do recommend surgical intervention.  I do think that the patient could be done on an outpatient basis.  Patient wants surgery sooner rather than later will schedule surgery for Thursday February 28th.  We will finish the preoperative laboratory values needed today and will schedule for surgery accordingly.  Patient will have a x-ray as well as a CT scan of the cervical spine for anatomy review.  And will bring the patient back for surgical procedure on Thursday as an outpatient.  We did not sign consents today however the patient is aware of all risks and benefits including pain infection bleeding, paralysis and death failure of the operation to relieve it all symptoms in the need for surgery in the future.  All questions were answered and will provide information about procedures in his AVS on discharge.    Thank you for the consult  Please call with any questions    Helio Wolfe MD  Neurosurgery                 Interval History:  Patient is seen today for follow-up with his MRI of the cervical spine for review.  Patient was having upper as well as lower extremity symptoms as well as difficulty with balance.  He is ambulating with a rolling walker this morning however the upper extremities still remained weak and he has difficulty with balance which is very concerning for him he was very active prior to this hospitalization.  States that he has numbness and tingling in the hands with radiation into the lower extremities.  He has back pain but the upper extremity symptoms and balance issues are his main complaint this morning.  He has been having difficulty with dexterity and fine motor tasks over the past several  weeks.    Medications:  Continuous Infusions:  Scheduled Meds:   aspirin  81 mg Oral Daily    cephALEXin  500 mg Oral Q8H    enoxaparin  40 mg Subcutaneous Daily    pantoprazole  40 mg Oral Daily    simvastatin  40 mg Oral QHS     PRN Meds:acetaminophen, ALPRAZolam, HYDROcodone-acetaminophen, lorazepam, ondansetron, sodium chloride 0.9%     Review of Systems   Constitutional: Negative for activity change, appetite change and chills.   HENT: Negative for congestion and ear pain.    Eyes: Negative for photophobia, redness and visual disturbance.   Respiratory: Negative for apnea and chest tightness.    Cardiovascular: Negative for chest pain.   Gastrointestinal: Negative for abdominal distention and abdominal pain.   Endocrine: Negative for cold intolerance.   Genitourinary: Negative for difficulty urinating.   Musculoskeletal: Positive for myalgias, neck pain and neck stiffness. Negative for arthralgias.   Skin: Negative for color change.   Allergic/Immunologic: Negative for environmental allergies.   Neurological: Positive for weakness and numbness. Negative for dizziness.   Hematological: Negative for adenopathy. Does not bruise/bleed easily.   Psychiatric/Behavioral: Negative for agitation, behavioral problems and confusion.     Objective:     Weight: 69.4 kg (153 lb 1.8 oz)  Body mass index is 19.66 kg/m².  Vital Signs (Most Recent):  Temp: 98.3 °F (36.8 °C) (02/22/19 1113)  Pulse: 62 (02/22/19 1130)  Resp: 17 (02/22/19 1113)  BP: 120/81 (02/22/19 1113)  SpO2: 98 % (02/22/19 1113) Vital Signs (24h Range):  Temp:  [97.4 °F (36.3 °C)-98.9 °F (37.2 °C)] 98.3 °F (36.8 °C)  Pulse:  [50-72] 62  Resp:  [16-18] 17  SpO2:  [94 %-98 %] 98 %  BP: (100-150)/(59-84) 120/81     Date 02/22/19 0700 - 02/23/19 0659   Shift 4518-9060 2748-4721 4727-6739 24 Hour Total   INTAKE   P.O. 594   594   Shift Total(mL/kg) 594(8.6)   594(8.6)   OUTPUT   Shift Total(mL/kg)       Weight (kg) 69.4 69.4 69.4 69.4                         Physical Exam:  Nursing note and vitals reviewed.    Constitutional: He appears well-developed and well-nourished. He is not diaphoretic. No distress.     Eyes: Pupils are equal, round, and reactive to light. EOM are normal.     Cardiovascular: Normal pulses, intact distal pulses, normal distal pulses, intact carotid pulses, normal carotid pulses and no edema.     Psych/Behavior: He is alert. He is oriented to person, place, and time. He has a normal mood and affect.     Musculoskeletal: Gait is abnormal.        Neck: Range of motion is limited. There is no tenderness. Muscle strength is 4/5. Tone is abnormal.        Back: Range of motion is limited. There is no tenderness. Muscle strength is 4/5. Tone is abnormal.        Right Upper Extremities: Range of motion is full. Muscle strength is 4/5. Tone is normal.        Left Upper Extremities: Range of motion is full. There is tenderness. Muscle strength is 4/5. Tone is normal.       Right Lower Extremities: Range of motion is full. There is no tenderness. Muscle strength is 4/5. Tone is normal.        Left Lower Extremities: Range of motion is full. There is no tenderness. Muscle strength is 4/5. Tone is normal.     Neurological:        Coordination: He has abnormal heel to shin coordination.        Sensory: There is (bilateral upper extremities) sensory deficit in the extremities.        DTRs: DTRs are DTRS NORMAL AND SYMMETRICnormal and symmetric. Tricep reflexes are 2+ on the right side and 2+ on the left side. Bicep reflexes are 2+ on the right side and 2+ on the left side. Brachioradialis reflexes are 2+ on the right side and 2+ on the left side. Patellar reflexes are 2+ on the right side and 2+ on the left side. Achilles reflexes are 2+ on the right side and 2+ on the left side. He displays Babinski's sign on the right side. He displays Babinski's sign on the left side.        Cranial nerves: Cranial nerve(s) II, III, IV, V, VI, VII, VIII, IX, X, XI and XII  are intact.       Significant Labs:  Recent Labs   Lab 02/21/19  0359 02/22/19  0353   GLU 95 92    143   K 4.1 4.4    106   CO2 27 28   BUN 23 22   CREATININE 0.8 0.8   CALCIUM 8.7 8.9   MG 2.0 2.4     Recent Labs   Lab 02/21/19  0359 02/22/19  0353   WBC 6.83 4.81   HGB 14.0 13.2*   HCT 43.6 40.6    173     No results for input(s): LABPT, INR, APTT in the last 48 hours.  Microbiology Results (last 7 days)     ** No results found for the last 168 hours. **        All pertinent labs from the last 24 hours have been reviewed.    Significant Diagnostics:  Multilevel advanced degenerative disc disease, spondylosis, and facet arthropathy.  Grade 1 spondylolisthesis with 4 mm anterolisthesis C4 on C5.  Multilevel ligamentum flavum buckling.  There is subsequent multilevel advanced central spinal canal stenosis at C3-4 (7 mm) and more notably C4-5 (5-6 mm) with subsequent effacement of the CSF signal with mild flattening deformity of the cervical spinal cord.  However, there is no abnormal spinal cord signal.  There is also multilevel scattered moderate to advanced neural foraminal stenosis, detailed above.  There is straightening of the of the lower cervical spine.  Degenerative sub endplate marrow edema posterior C4-5.  Negative for occult cervical spine fracture.

## 2019-02-22 NOTE — PLAN OF CARE
Problem: Adult Inpatient Plan of Care  Goal: Patient-Specific Goal (Individualization)  Outcome: Ongoing (interventions implemented as appropriate)  Pt in bed AAOx4. Plan of Care reviewed, Verbalized understanding.   Patient remained free from falls, fall precautions in place.  Patient is Sinus bradycardia on monitor.VSS.  Call bell and personal belongings within reach. Hourly rounding complete. Reminded to call for assistance. No complaints at this time. Will continue to monitor.

## 2019-02-23 DIAGNOSIS — M48.02 STENOSIS OF CERVICAL SPINE WITH MYELOPATHY: Primary | ICD-10-CM

## 2019-02-23 DIAGNOSIS — G99.2 STENOSIS OF CERVICAL SPINE WITH MYELOPATHY: Primary | ICD-10-CM

## 2019-02-25 PROBLEM — M51.369 DDD (DEGENERATIVE DISC DISEASE), LUMBAR: Status: ACTIVE | Noted: 2019-02-25

## 2019-02-25 PROBLEM — M51.36 DDD (DEGENERATIVE DISC DISEASE), LUMBAR: Status: ACTIVE | Noted: 2019-02-25

## 2019-02-25 NOTE — PRE-PROCEDURE INSTRUCTIONS
Pre op instructions reviewed with patient per phone:    To confirm, Your surgeon has instructed you:  Surgery is scheduled 2/28/19 at 1045.      Please report to [facility] 1st floor main lobby by 0915.   Pre admit office to call afternoon prior to surgery with final arrival time      INSTRUCTIONS IMPORTANT!!!  ¨ Do not eat, drink, or smoke after 12 midnight-including water. OK to brush teeth, no gum, candy or mints!    ¨ Take only these medicines with a small swallow of water-morning of surgery.  Nexium, Simvastatin    ____  Do not wear makeup, including mascara.  ____  No powder, lotions or creams to surgical area.  ____  Please remove all jewelry, including piercings and leave at home.  ____  No money or valuables needed. Please leave at home.  ____  Please bring identification and insurance information to hospital.  ____  If going home the same day, arrange for a ride home. You will not be able to   drive if Anesthesia was used.  ____  Children, under 12 years old, must remain in the waiting room with an adult.  They are not allowed in patient areas.  ____  Wear loose fitting clothing. Allow for dressings, bandages.  ____  Stop Aspirin, Ibuprofen, Motrin and Aleve at least 5-7 days before surgery, unless otherwise instructed by your doctor, or the nurse.   You MAY use Tylenol/acetaminophen until day of surgery.  ____  If you take diabetic medication, do not take am of surgery unless instructed by   Doctor.  ____ Stop taking any Fish Oil supplement or any Vitamins that contain Vitamin E at least 5 days prior to surgery.          Bathing Instructions-- The night before surgery and the morning prior to coming to the hospital:   -Do not shave the surgical area.   -Shower and wash your hair and body as usual with anti-bacterial  soap and shampoo.   -Rinse your hair and body completely.   -Use one packet of hibiclens to wash the surgical site (using your hand) gently for 5 minutes.  Do not  scrub you skin too hard.   -Do not use hibiclens on your head, face, or genitals.   -Do not wash with anti-bacterial soap after you use the hibiclens.   -Rinse your body thoroughly.   -Dry with clean, soft towel.  Do not use lotion, cream, deodorant, or powders on   the surgical site.    Use antibacterial soap in place of hibiclens if your surgery is on the head, face or genitals.         Surgical Site Infection    Prevention of surgical site infections:     -Keep incisions clean and dry.   -Do not soak/submerge incisions in water until completely healed.   -Do not apply lotions, powders, creams, or deodorants to site.   -Always make sure hands are cleaned with antibacterial soap/ alcohol-based   prior to touching the surgical site.  (This includes doctors, nurses, staff, and yourself.)    Signs and symptoms:   -Redness and pain around the area where you had surgery   -Drainage of cloudy fluid from your surgical wound   -Fever over 100.4  I have read or had read and explained to me, and understand the above information.

## 2019-02-27 ENCOUNTER — TELEPHONE (OUTPATIENT)
Dept: NEUROSURGERY | Facility: CLINIC | Age: 69
End: 2019-02-27

## 2019-02-27 NOTE — TELEPHONE ENCOUNTER
Spoke with Kerry, informed her we will change the codes//ns     ----- Message from Salome Palacio sent at 2/27/2019  7:39 AM CST -----  Contact: ms kerry-[facility] preservice  needs callback regarding possibly having surgery changed from inpatient to outpatient...[phone]

## 2019-02-27 NOTE — TELEPHONE ENCOUNTER
Spoke with pt informed him his procedure date has changed to Monday and Wednesday and has an office visit for 3/1/19 with Dr. Wolfe//ns

## 2019-03-01 ENCOUNTER — OFFICE VISIT (OUTPATIENT)
Dept: NEUROSURGERY | Facility: CLINIC | Age: 69
End: 2019-03-01
Payer: MEDICARE

## 2019-03-01 VITALS
HEART RATE: 54 BPM | BODY MASS INDEX: 20.54 KG/M2 | SYSTOLIC BLOOD PRESSURE: 152 MMHG | DIASTOLIC BLOOD PRESSURE: 85 MMHG | HEIGHT: 74 IN | WEIGHT: 160.06 LBS

## 2019-03-01 DIAGNOSIS — M48.02 CERVICAL STENOSIS OF SPINAL CANAL: Primary | ICD-10-CM

## 2019-03-01 DIAGNOSIS — M48.02 STENOSIS OF CERVICAL SPINE WITH MYELOPATHY: Primary | ICD-10-CM

## 2019-03-01 DIAGNOSIS — G99.2 STENOSIS OF CERVICAL SPINE WITH MYELOPATHY: Primary | ICD-10-CM

## 2019-03-01 PROCEDURE — 1100F PTFALLS ASSESS-DOCD GE2>/YR: CPT | Mod: CPTII,S$GLB,, | Performed by: NEUROLOGICAL SURGERY

## 2019-03-01 PROCEDURE — 99999 PR PBB SHADOW E&M-EST. PATIENT-LVL III: ICD-10-PCS | Mod: PBBFAC,,, | Performed by: NEUROLOGICAL SURGERY

## 2019-03-01 PROCEDURE — 3288F PR FALLS RISK ASSESSMENT DOCUMENTED: ICD-10-PCS | Mod: CPTII,S$GLB,, | Performed by: NEUROLOGICAL SURGERY

## 2019-03-01 PROCEDURE — 1100F PR PT FALLS ASSESS DOC 2+ FALLS/FALL W/INJURY/YR: ICD-10-PCS | Mod: CPTII,S$GLB,, | Performed by: NEUROLOGICAL SURGERY

## 2019-03-01 PROCEDURE — 99213 PR OFFICE/OUTPT VISIT, EST, LEVL III, 20-29 MIN: ICD-10-PCS | Mod: S$GLB,,, | Performed by: NEUROLOGICAL SURGERY

## 2019-03-01 PROCEDURE — 99999 PR PBB SHADOW E&M-EST. PATIENT-LVL III: CPT | Mod: PBBFAC,,, | Performed by: NEUROLOGICAL SURGERY

## 2019-03-01 PROCEDURE — 3288F FALL RISK ASSESSMENT DOCD: CPT | Mod: CPTII,S$GLB,, | Performed by: NEUROLOGICAL SURGERY

## 2019-03-01 PROCEDURE — 99213 OFFICE O/P EST LOW 20 MIN: CPT | Mod: S$GLB,,, | Performed by: NEUROLOGICAL SURGERY

## 2019-03-01 RX ORDER — CALCIUM CARBONATE 500(1250)
1 TABLET ORAL DAILY
COMMUNITY

## 2019-03-01 RX ORDER — CHOLECALCIFEROL (VITAMIN D3) 25 MCG
5000 TABLET ORAL DAILY
COMMUNITY

## 2019-03-04 ENCOUNTER — HOSPITAL ENCOUNTER (INPATIENT)
Facility: HOSPITAL | Age: 69
LOS: 5 days | Discharge: HOME HEALTH | DRG: 473 | End: 2019-03-09
Attending: NEUROLOGICAL SURGERY | Admitting: NEUROLOGICAL SURGERY
Payer: MEDICARE

## 2019-03-04 DIAGNOSIS — M50.30 DEGENERATIVE DISC DISEASE, CERVICAL: Primary | ICD-10-CM

## 2019-03-04 LAB
ABO + RH BLD: NORMAL
ANION GAP SERPL CALC-SCNC: 9 MMOL/L
BILIRUB UR QL STRIP: NEGATIVE
BLD GP AB SCN CELLS X3 SERPL QL: NORMAL
BUN SERPL-MCNC: 22 MG/DL
CALCIUM SERPL-MCNC: 8.3 MG/DL
CHLORIDE SERPL-SCNC: 108 MMOL/L
CLARITY UR: CLEAR
CO2 SERPL-SCNC: 23 MMOL/L
COLOR UR: YELLOW
CREAT SERPL-MCNC: 0.8 MG/DL
EST. GFR  (AFRICAN AMERICAN): >60 ML/MIN/1.73 M^2
EST. GFR  (NON AFRICAN AMERICAN): >60 ML/MIN/1.73 M^2
GLUCOSE SERPL-MCNC: 121 MG/DL
GLUCOSE UR QL STRIP: NEGATIVE
HGB UR QL STRIP: NEGATIVE
KETONES UR QL STRIP: NEGATIVE
LEUKOCYTE ESTERASE UR QL STRIP: NEGATIVE
NITRITE UR QL STRIP: NEGATIVE
PH UR STRIP: 5 [PH] (ref 5–8)
POTASSIUM SERPL-SCNC: 4.1 MMOL/L
PROT UR QL STRIP: NEGATIVE
SODIUM SERPL-SCNC: 140 MMOL/L
SP GR UR STRIP: 1.02 (ref 1–1.03)
URN SPEC COLLECT METH UR: NORMAL
UROBILINOGEN UR STRIP-ACNC: NEGATIVE EU/DL

## 2019-03-04 PROCEDURE — 25000003 PHARM REV CODE 250: Performed by: NEUROLOGICAL SURGERY

## 2019-03-04 PROCEDURE — 36000712 HC OR TIME LEV V 1ST 15 MIN: Performed by: NEUROLOGICAL SURGERY

## 2019-03-04 PROCEDURE — 63600175 PHARM REV CODE 636 W HCPCS: Performed by: PHYSICIAN ASSISTANT

## 2019-03-04 PROCEDURE — 27201423 OPTIME MED/SURG SUP & DEVICES STERILE SUPPLY: Performed by: NEUROLOGICAL SURGERY

## 2019-03-04 PROCEDURE — C1729 CATH, DRAINAGE: HCPCS | Performed by: NEUROLOGICAL SURGERY

## 2019-03-04 PROCEDURE — 22842 PR POSTERIOR SEGMENTAL INSTRUMENTATION 3-6 VRT SEG: ICD-10-PCS | Mod: AS,,, | Performed by: PHYSICIAN ASSISTANT

## 2019-03-04 PROCEDURE — 63600175 PHARM REV CODE 636 W HCPCS: Performed by: ANESTHESIOLOGY

## 2019-03-04 PROCEDURE — 22600 ARTHRD PST TQ 1NTRSPC CRV: CPT | Mod: 51,,, | Performed by: NEUROLOGICAL SURGERY

## 2019-03-04 PROCEDURE — 22614 ARTHRD PST TQ 1NTRSPC EA ADD: CPT | Mod: ,,, | Performed by: NEUROLOGICAL SURGERY

## 2019-03-04 PROCEDURE — 20936 PR AUTOGRAFT SPINE SURGERY LOCAL FROM SAME INCISION: ICD-10-PCS | Mod: ,,, | Performed by: NEUROLOGICAL SURGERY

## 2019-03-04 PROCEDURE — 22614 ARTHRD PST TQ 1NTRSPC EA ADD: CPT | Mod: AS,,, | Performed by: PHYSICIAN ASSISTANT

## 2019-03-04 PROCEDURE — 81003 URINALYSIS AUTO W/O SCOPE: CPT

## 2019-03-04 PROCEDURE — 22614 PR ARTHRODESIS, POST/POSTLAT, SNGL INTERSPACE, EA ADDTL: ICD-10-PCS | Mod: ,,, | Performed by: NEUROLOGICAL SURGERY

## 2019-03-04 PROCEDURE — 63045 PR LAMINEC/FACETECT/FORAMIN,CERVICAL 1 SEG: ICD-10-PCS | Mod: AS,,, | Performed by: PHYSICIAN ASSISTANT

## 2019-03-04 PROCEDURE — 11000001 HC ACUTE MED/SURG PRIVATE ROOM

## 2019-03-04 PROCEDURE — 22842 INSERT SPINE FIXATION DEVICE: CPT | Mod: AS,,, | Performed by: PHYSICIAN ASSISTANT

## 2019-03-04 PROCEDURE — 36000713 HC OR TIME LEV V EA ADD 15 MIN: Performed by: NEUROLOGICAL SURGERY

## 2019-03-04 PROCEDURE — 63048 PR LAMINECT/FACETECT/FORAMINOT, EA ADDTL VERTEBRAL SEGM: ICD-10-PCS | Mod: ,,, | Performed by: NEUROLOGICAL SURGERY

## 2019-03-04 PROCEDURE — 63600175 PHARM REV CODE 636 W HCPCS: Performed by: NEUROLOGICAL SURGERY

## 2019-03-04 PROCEDURE — 37000009 HC ANESTHESIA EA ADD 15 MINS: Performed by: NEUROLOGICAL SURGERY

## 2019-03-04 PROCEDURE — 25000003 PHARM REV CODE 250: Performed by: ANESTHESIOLOGY

## 2019-03-04 PROCEDURE — 63048 LAM FACETEC &FORAMOT EA ADDL: CPT | Mod: ,,, | Performed by: NEUROLOGICAL SURGERY

## 2019-03-04 PROCEDURE — 86901 BLOOD TYPING SEROLOGIC RH(D): CPT

## 2019-03-04 PROCEDURE — 71000033 HC RECOVERY, INTIAL HOUR: Performed by: NEUROLOGICAL SURGERY

## 2019-03-04 PROCEDURE — 22842 INSERT SPINE FIXATION DEVICE: CPT | Mod: ,,, | Performed by: NEUROLOGICAL SURGERY

## 2019-03-04 PROCEDURE — 20930 PR ALLOGRAFT FOR SPINE SURGERY ONLY MORSELIZED: ICD-10-PCS | Mod: ,,, | Performed by: NEUROLOGICAL SURGERY

## 2019-03-04 PROCEDURE — 71000039 HC RECOVERY, EACH ADD'L HOUR: Performed by: NEUROLOGICAL SURGERY

## 2019-03-04 PROCEDURE — 63045 LAM FACETEC & FORAMOT CRV: CPT | Mod: ,,, | Performed by: NEUROLOGICAL SURGERY

## 2019-03-04 PROCEDURE — 80048 BASIC METABOLIC PNL TOTAL CA: CPT

## 2019-03-04 PROCEDURE — 63048 LAM FACETEC &FORAMOT EA ADDL: CPT | Mod: AS,,, | Performed by: PHYSICIAN ASSISTANT

## 2019-03-04 PROCEDURE — C9290 INJ, BUPIVACAINE LIPOSOME: HCPCS | Performed by: NEUROLOGICAL SURGERY

## 2019-03-04 PROCEDURE — 25000003 PHARM REV CODE 250: Performed by: PHYSICIAN ASSISTANT

## 2019-03-04 PROCEDURE — 20936 SP BONE AGRFT LOCAL ADD-ON: CPT | Mod: ,,, | Performed by: NEUROLOGICAL SURGERY

## 2019-03-04 PROCEDURE — 63045 LAM FACETEC & FORAMOT CRV: CPT | Mod: AS,,, | Performed by: PHYSICIAN ASSISTANT

## 2019-03-04 PROCEDURE — 27000221 HC OXYGEN, UP TO 24 HOURS

## 2019-03-04 PROCEDURE — 37000008 HC ANESTHESIA 1ST 15 MINUTES: Performed by: NEUROLOGICAL SURGERY

## 2019-03-04 PROCEDURE — 20930 SP BONE ALGRFT MORSEL ADD-ON: CPT | Mod: ,,, | Performed by: NEUROLOGICAL SURGERY

## 2019-03-04 PROCEDURE — 22600 PR ARTHRODESIS, POST/POSTLAT, SNGL INTERSPACE, CERVICAL BELOW C2: ICD-10-PCS | Mod: 51,AS,, | Performed by: PHYSICIAN ASSISTANT

## 2019-03-04 PROCEDURE — 22600 PR ARTHRODESIS, POST/POSTLAT, SNGL INTERSPACE, CERVICAL BELOW C2: ICD-10-PCS | Mod: 51,,, | Performed by: NEUROLOGICAL SURGERY

## 2019-03-04 PROCEDURE — 27800903 OPTIME MED/SURG SUP & DEVICES OTHER IMPLANTS: Performed by: NEUROLOGICAL SURGERY

## 2019-03-04 PROCEDURE — 63048 PR LAMINECT/FACETECT/FORAMINOT, EA ADDTL VERTEBRAL SEGM: ICD-10-PCS | Mod: AS,,, | Performed by: PHYSICIAN ASSISTANT

## 2019-03-04 PROCEDURE — 63045 PR LAMINEC/FACETECT/FORAMIN,CERVICAL 1 SEG: ICD-10-PCS | Mod: ,,, | Performed by: NEUROLOGICAL SURGERY

## 2019-03-04 PROCEDURE — 22614 PR ARTHRODESIS, POST/POSTLAT, SNGL INTERSPACE, EA ADDTL: ICD-10-PCS | Mod: AS,,, | Performed by: PHYSICIAN ASSISTANT

## 2019-03-04 PROCEDURE — 22600 ARTHRD PST TQ 1NTRSPC CRV: CPT | Mod: 51,AS,, | Performed by: PHYSICIAN ASSISTANT

## 2019-03-04 PROCEDURE — 22842 PR POSTERIOR SEGMENTAL INSTRUMENTATION 3-6 VRT SEG: ICD-10-PCS | Mod: ,,, | Performed by: NEUROLOGICAL SURGERY

## 2019-03-04 PROCEDURE — C1713 ANCHOR/SCREW BN/BN,TIS/BN: HCPCS | Performed by: NEUROLOGICAL SURGERY

## 2019-03-04 RX ORDER — ACETAMINOPHEN 325 MG/1
650 TABLET ORAL EVERY 6 HOURS PRN
Status: DISCONTINUED | OUTPATIENT
Start: 2019-03-04 | End: 2019-03-09 | Stop reason: HOSPADM

## 2019-03-04 RX ORDER — HYDRALAZINE HYDROCHLORIDE 20 MG/ML
10 INJECTION INTRAMUSCULAR; INTRAVENOUS EVERY 6 HOURS PRN
Status: DISCONTINUED | OUTPATIENT
Start: 2019-03-04 | End: 2019-03-09 | Stop reason: HOSPADM

## 2019-03-04 RX ORDER — OXYCODONE AND ACETAMINOPHEN 5; 325 MG/1; MG/1
1 TABLET ORAL EVERY 4 HOURS PRN
Status: DISCONTINUED | OUTPATIENT
Start: 2019-03-04 | End: 2019-03-05 | Stop reason: SDUPTHER

## 2019-03-04 RX ORDER — MEPERIDINE HYDROCHLORIDE 50 MG/ML
12.5 INJECTION INTRAMUSCULAR; INTRAVENOUS; SUBCUTANEOUS ONCE AS NEEDED
Status: COMPLETED | OUTPATIENT
Start: 2019-03-04 | End: 2019-03-04

## 2019-03-04 RX ORDER — HYDROMORPHONE HYDROCHLORIDE 1 MG/ML
1 INJECTION, SOLUTION INTRAMUSCULAR; INTRAVENOUS; SUBCUTANEOUS
Status: DISCONTINUED | OUTPATIENT
Start: 2019-03-04 | End: 2019-03-05

## 2019-03-04 RX ORDER — SIMVASTATIN 20 MG/1
40 TABLET, FILM COATED ORAL DAILY
Status: CANCELLED | OUTPATIENT
Start: 2019-03-05

## 2019-03-04 RX ORDER — VANCOMYCIN HCL IN 5 % DEXTROSE 1G/250ML
1000 PLASTIC BAG, INJECTION (ML) INTRAVENOUS
Status: COMPLETED | OUTPATIENT
Start: 2019-03-05 | End: 2019-03-05

## 2019-03-04 RX ORDER — SODIUM CHLORIDE 0.9 % (FLUSH) 0.9 %
3 SYRINGE (ML) INJECTION
Status: DISCONTINUED | OUTPATIENT
Start: 2019-03-04 | End: 2019-03-04 | Stop reason: HOSPADM

## 2019-03-04 RX ORDER — ACETAMINOPHEN 500 MG
1000 TABLET ORAL
Status: COMPLETED | OUTPATIENT
Start: 2019-03-04 | End: 2019-03-04

## 2019-03-04 RX ORDER — DOCUSATE SODIUM 100 MG/1
100 CAPSULE, LIQUID FILLED ORAL DAILY
Status: CANCELLED | OUTPATIENT
Start: 2019-03-05

## 2019-03-04 RX ORDER — VANCOMYCIN HYDROCHLORIDE 1 G/20ML
INJECTION, POWDER, LYOPHILIZED, FOR SOLUTION INTRAVENOUS
Status: DISCONTINUED | OUTPATIENT
Start: 2019-03-04 | End: 2019-03-04 | Stop reason: HOSPADM

## 2019-03-04 RX ORDER — VANCOMYCIN HCL IN 5 % DEXTROSE 1G/250ML
1000 PLASTIC BAG, INJECTION (ML) INTRAVENOUS
Status: COMPLETED | OUTPATIENT
Start: 2019-03-04 | End: 2019-03-04

## 2019-03-04 RX ORDER — BACITRACIN ZINC 500 UNIT/G
OINTMENT (GRAM) TOPICAL
Status: DISCONTINUED | OUTPATIENT
Start: 2019-03-04 | End: 2019-03-04 | Stop reason: HOSPADM

## 2019-03-04 RX ORDER — DIPHENHYDRAMINE HCL 25 MG
50 CAPSULE ORAL EVERY 6 HOURS PRN
Status: CANCELLED | OUTPATIENT
Start: 2019-03-04

## 2019-03-04 RX ORDER — MORPHINE SULFATE 4 MG/ML
2 INJECTION, SOLUTION INTRAMUSCULAR; INTRAVENOUS EVERY 5 MIN PRN
Status: DISCONTINUED | OUTPATIENT
Start: 2019-03-04 | End: 2019-03-04 | Stop reason: HOSPADM

## 2019-03-04 RX ORDER — HYDROMORPHONE HYDROCHLORIDE 2 MG/ML
2 INJECTION, SOLUTION INTRAMUSCULAR; INTRAVENOUS; SUBCUTANEOUS
Status: DISCONTINUED | OUTPATIENT
Start: 2019-03-04 | End: 2019-03-05

## 2019-03-04 RX ORDER — SODIUM CHLORIDE, SODIUM LACTATE, POTASSIUM CHLORIDE, CALCIUM CHLORIDE 600; 310; 30; 20 MG/100ML; MG/100ML; MG/100ML; MG/100ML
INJECTION, SOLUTION INTRAVENOUS CONTINUOUS
Status: DISCONTINUED | OUTPATIENT
Start: 2019-03-04 | End: 2019-03-06

## 2019-03-04 RX ORDER — BACITRACIN 50000 [IU]/1
INJECTION, POWDER, FOR SOLUTION INTRAMUSCULAR
Status: DISCONTINUED | OUTPATIENT
Start: 2019-03-04 | End: 2019-03-04 | Stop reason: HOSPADM

## 2019-03-04 RX ORDER — DIAZEPAM 5 MG/1
5 TABLET ORAL EVERY 6 HOURS PRN
Status: DISCONTINUED | OUTPATIENT
Start: 2019-03-04 | End: 2019-03-05

## 2019-03-04 RX ORDER — PROMETHAZINE HYDROCHLORIDE 25 MG/1
25 TABLET ORAL EVERY 6 HOURS PRN
Status: CANCELLED | OUTPATIENT
Start: 2019-03-04

## 2019-03-04 RX ORDER — LIDOCAINE HYDROCHLORIDE 10 MG/ML
1 INJECTION, SOLUTION EPIDURAL; INFILTRATION; INTRACAUDAL; PERINEURAL ONCE
Status: ACTIVE | OUTPATIENT
Start: 2019-03-04

## 2019-03-04 RX ORDER — AMOXICILLIN 250 MG
2 CAPSULE ORAL NIGHTLY PRN
Status: DISCONTINUED | OUTPATIENT
Start: 2019-03-04 | End: 2019-03-09 | Stop reason: HOSPADM

## 2019-03-04 RX ORDER — BUPIVACAINE HYDROCHLORIDE 2.5 MG/ML
INJECTION, SOLUTION EPIDURAL; INFILTRATION; INTRACAUDAL
Status: DISCONTINUED | OUTPATIENT
Start: 2019-03-04 | End: 2019-03-04 | Stop reason: HOSPADM

## 2019-03-04 RX ORDER — ONDANSETRON 2 MG/ML
4 INJECTION INTRAMUSCULAR; INTRAVENOUS DAILY PRN
Status: DISCONTINUED | OUTPATIENT
Start: 2019-03-04 | End: 2019-03-04 | Stop reason: HOSPADM

## 2019-03-04 RX ORDER — OXYCODONE AND ACETAMINOPHEN 5; 325 MG/1; MG/1
1 TABLET ORAL
Status: DISCONTINUED | OUTPATIENT
Start: 2019-03-04 | End: 2019-03-04 | Stop reason: HOSPADM

## 2019-03-04 RX ORDER — ONDANSETRON 8 MG/1
8 TABLET, ORALLY DISINTEGRATING ORAL EVERY 6 HOURS PRN
Status: CANCELLED | OUTPATIENT
Start: 2019-03-04

## 2019-03-04 RX ORDER — SODIUM CHLORIDE 9 MG/ML
INJECTION, SOLUTION INTRAVENOUS CONTINUOUS
Status: DISCONTINUED | OUTPATIENT
Start: 2019-03-04 | End: 2019-03-06

## 2019-03-04 RX ORDER — CHLORHEXIDINE GLUCONATE ORAL RINSE 1.2 MG/ML
10 SOLUTION DENTAL
Status: ACTIVE | OUTPATIENT
Start: 2019-03-04

## 2019-03-04 RX ORDER — HYDROCODONE BITARTRATE AND ACETAMINOPHEN 10; 325 MG/1; MG/1
1 TABLET ORAL EVERY 4 HOURS PRN
Status: DISCONTINUED | OUTPATIENT
Start: 2019-03-04 | End: 2019-03-05

## 2019-03-04 RX ADMIN — HYDROCODONE BITARTRATE AND ACETAMINOPHEN 1 TABLET: 10; 325 TABLET ORAL at 09:03

## 2019-03-04 RX ADMIN — ACETAMINOPHEN 1000 MG: 500 TABLET ORAL at 12:03

## 2019-03-04 RX ADMIN — MORPHINE SULFATE 2 MG: 4 INJECTION INTRAVENOUS at 06:03

## 2019-03-04 RX ADMIN — MEPERIDINE HYDROCHLORIDE 12.5 MG: 50 INJECTION, SOLUTION INTRAMUSCULAR; INTRAVENOUS; SUBCUTANEOUS at 06:03

## 2019-03-04 RX ADMIN — VANCOMYCIN HYDROCHLORIDE 1000 MG: 1 INJECTION, POWDER, FOR SOLUTION INTRAVENOUS at 01:03

## 2019-03-04 NOTE — INTERVAL H&P NOTE
The patient has been examined and the H&P has been reviewed:    I concur with the findings and no changes have occurred since H&P was written.  To OR for posterior cervical fusion   Anesthesia/Surgery risks, benefits and alternative options discussed and understood by patient/family.          Active Hospital Problems    Diagnosis  POA    Degenerative disc disease, cervical [M50.30]  Yes      Resolved Hospital Problems   No resolved problems to display.

## 2019-03-04 NOTE — BRIEF OP NOTE
[facility] -   Brief Operative Note    SUMMARY     Surgery Date: 3/4/2019     Surgeon(s) and Role:     * Helio Wolfe MD - Primary       Assistant Kathy Elizabeth PA-C    Pre-op Diagnosis:  Stenosis of cervical spine with myelopathy [M47.12]    Post-op Diagnosis:  Post-Op Diagnosis Codes:     * Stenosis of cervical spine with myelopathy [M47.12]    Procedure(s) (LRB):  FUSION, SPINE, POSTERIOR SPINAL COLUMN, CERVICAL, USING COMPUTER-ASSISTED NAVIGATION C3-5 (Bilateral)    Anesthesia: General      Description of the findings of the procedure: cervical stenosis     Estimated Blood Loss: * No values recorded between 3/4/2019  2:42 PM and 3/4/2019  5:53 PM        Specimens:   Specimen (12h ago, onward)    None

## 2019-03-05 LAB
BASOPHILS # BLD AUTO: 0.01 K/UL
BASOPHILS NFR BLD: 0.1 %
DIFFERENTIAL METHOD: ABNORMAL
EOSINOPHIL # BLD AUTO: 0.1 K/UL
EOSINOPHIL NFR BLD: 0.7 %
ERYTHROCYTE [DISTWIDTH] IN BLOOD BY AUTOMATED COUNT: 14.2 %
HCT VFR BLD AUTO: 38.4 %
HGB BLD-MCNC: 12.5 G/DL
LYMPHOCYTES # BLD AUTO: 0.8 K/UL
LYMPHOCYTES NFR BLD: 8.9 %
MCH RBC QN AUTO: 29.7 PG
MCHC RBC AUTO-ENTMCNC: 32.6 G/DL
MCV RBC AUTO: 91 FL
MONOCYTES # BLD AUTO: 0.7 K/UL
MONOCYTES NFR BLD: 7.4 %
NEUTROPHILS # BLD AUTO: 7.4 K/UL
NEUTROPHILS NFR BLD: 82.9 %
PLATELET # BLD AUTO: 155 K/UL
PMV BLD AUTO: 9.6 FL
RBC # BLD AUTO: 4.21 M/UL
WBC # BLD AUTO: 8.95 K/UL

## 2019-03-05 PROCEDURE — 25000003 PHARM REV CODE 250: Performed by: NEUROLOGICAL SURGERY

## 2019-03-05 PROCEDURE — 94799 UNLISTED PULMONARY SVC/PX: CPT

## 2019-03-05 PROCEDURE — 27000221 HC OXYGEN, UP TO 24 HOURS

## 2019-03-05 PROCEDURE — 85025 COMPLETE CBC W/AUTO DIFF WBC: CPT

## 2019-03-05 PROCEDURE — 25000003 PHARM REV CODE 250: Performed by: PHYSICIAN ASSISTANT

## 2019-03-05 PROCEDURE — 97162 PT EVAL MOD COMPLEX 30 MIN: CPT

## 2019-03-05 PROCEDURE — 11000001 HC ACUTE MED/SURG PRIVATE ROOM

## 2019-03-05 PROCEDURE — 94761 N-INVAS EAR/PLS OXIMETRY MLT: CPT

## 2019-03-05 PROCEDURE — 97116 GAIT TRAINING THERAPY: CPT

## 2019-03-05 PROCEDURE — 36415 COLL VENOUS BLD VENIPUNCTURE: CPT

## 2019-03-05 PROCEDURE — 63600175 PHARM REV CODE 636 W HCPCS: Performed by: NEUROLOGICAL SURGERY

## 2019-03-05 RX ORDER — TRAMADOL HYDROCHLORIDE 50 MG/1
50 TABLET ORAL EVERY 6 HOURS PRN
Status: DISCONTINUED | OUTPATIENT
Start: 2019-03-05 | End: 2019-03-09 | Stop reason: HOSPADM

## 2019-03-05 RX ORDER — CHLORHEXIDINE GLUCONATE ORAL RINSE 1.2 MG/ML
10 SOLUTION DENTAL
Status: CANCELLED | OUTPATIENT
Start: 2019-03-05

## 2019-03-05 RX ORDER — HYDROCODONE BITARTRATE AND ACETAMINOPHEN 10; 325 MG/1; MG/1
1 TABLET ORAL EVERY 4 HOURS PRN
Status: DISCONTINUED | OUTPATIENT
Start: 2019-03-05 | End: 2019-03-09 | Stop reason: HOSPADM

## 2019-03-05 RX ORDER — ALPRAZOLAM 0.25 MG/1
0.25 TABLET ORAL 3 TIMES DAILY PRN
Status: DISCONTINUED | OUTPATIENT
Start: 2019-03-05 | End: 2019-03-09 | Stop reason: HOSPADM

## 2019-03-05 RX ORDER — SODIUM CHLORIDE 9 MG/ML
INJECTION, SOLUTION INTRAVENOUS CONTINUOUS
Status: CANCELLED | OUTPATIENT
Start: 2019-03-05

## 2019-03-05 RX ORDER — HYDROMORPHONE HYDROCHLORIDE 2 MG/ML
2 INJECTION, SOLUTION INTRAMUSCULAR; INTRAVENOUS; SUBCUTANEOUS
Status: DISCONTINUED | OUTPATIENT
Start: 2019-03-05 | End: 2019-03-09 | Stop reason: HOSPADM

## 2019-03-05 RX ORDER — VANCOMYCIN HCL IN 5 % DEXTROSE 1G/250ML
1000 PLASTIC BAG, INJECTION (ML) INTRAVENOUS
Status: CANCELLED | OUTPATIENT
Start: 2019-03-05

## 2019-03-05 RX ORDER — METHOCARBAMOL 750 MG/1
750 TABLET, FILM COATED ORAL 3 TIMES DAILY
Status: DISCONTINUED | OUTPATIENT
Start: 2019-03-05 | End: 2019-03-09 | Stop reason: HOSPADM

## 2019-03-05 RX ORDER — HYDROMORPHONE HYDROCHLORIDE 1 MG/ML
1 INJECTION, SOLUTION INTRAMUSCULAR; INTRAVENOUS; SUBCUTANEOUS
Status: DISCONTINUED | OUTPATIENT
Start: 2019-03-05 | End: 2019-03-09 | Stop reason: HOSPADM

## 2019-03-05 RX ORDER — HYDROMORPHONE HYDROCHLORIDE 5 MG/5ML
1 SOLUTION ORAL
Status: DISCONTINUED | OUTPATIENT
Start: 2019-03-05 | End: 2019-03-05 | Stop reason: CLARIF

## 2019-03-05 RX ADMIN — HYDROCODONE BITARTRATE AND ACETAMINOPHEN 1 TABLET: 10; 325 TABLET ORAL at 02:03

## 2019-03-05 RX ADMIN — HYDROCODONE BITARTRATE AND ACETAMINOPHEN 1 TABLET: 10; 325 TABLET ORAL at 06:03

## 2019-03-05 RX ADMIN — HYDROCODONE BITARTRATE AND ACETAMINOPHEN 1 TABLET: 10; 325 TABLET ORAL at 11:03

## 2019-03-05 RX ADMIN — TRAMADOL HYDROCHLORIDE 50 MG: 50 TABLET, COATED ORAL at 02:03

## 2019-03-05 RX ADMIN — ALPRAZOLAM 0.25 MG: 0.25 TABLET ORAL at 03:03

## 2019-03-05 RX ADMIN — VANCOMYCIN HYDROCHLORIDE 1000 MG: 1 INJECTION, POWDER, FOR SOLUTION INTRAVENOUS at 01:03

## 2019-03-05 RX ADMIN — HYDROMORPHONE HYDROCHLORIDE 1 MG: 1 INJECTION, SOLUTION INTRAMUSCULAR; INTRAVENOUS; SUBCUTANEOUS at 08:03

## 2019-03-05 RX ADMIN — HYPROMELLOSE 2910 1 DROP: 5 SOLUTION OPHTHALMIC at 02:03

## 2019-03-05 RX ADMIN — ACETAMINOPHEN 650 MG: 325 TABLET ORAL at 07:03

## 2019-03-05 RX ADMIN — METHOCARBAMOL TABLETS 750 MG: 750 TABLET, COATED ORAL at 09:03

## 2019-03-05 RX ADMIN — ALPRAZOLAM 0.25 MG: 0.25 TABLET ORAL at 11:03

## 2019-03-05 RX ADMIN — HYDROCODONE BITARTRATE AND ACETAMINOPHEN 1 TABLET: 10; 325 TABLET ORAL at 09:03

## 2019-03-05 RX ADMIN — TRAMADOL HYDROCHLORIDE 50 MG: 50 TABLET, COATED ORAL at 11:03

## 2019-03-05 RX ADMIN — HYDROCODONE BITARTRATE AND ACETAMINOPHEN 1 TABLET: 10; 325 TABLET ORAL at 03:03

## 2019-03-05 RX ADMIN — DIAZEPAM 5 MG: 5 TABLET ORAL at 07:03

## 2019-03-05 RX ADMIN — DIAZEPAM 5 MG: 5 TABLET ORAL at 01:03

## 2019-03-05 NOTE — PT/OT/SLP EVAL
Physical Therapy Evaluation    Patient Name:  Brett Nugent   MRN:  [id number]    Recommendations:     Discharge Recommendations:  rehabilitation facility, other (see comments)(VS  P.T. WITH 24 HOUR CARE)   Discharge Equipment Recommendations: none   Barriers to discharge: None    Assessment:     Brett Nugent is a 68 y.o. male admitted with a medical diagnosis of <principal problem not specified>.  He presents with the following impairments/functional limitations:  weakness, gait instability, decreased upper extremity function, decreased lower extremity function, impaired balance, impaired endurance, impaired functional mobilty, pain, decreased ROM .    Rehab Prognosis: Good; patient would benefit from acute skilled PT services to address these deficits and reach maximum level of function.    Recent Surgery: Procedure(s) (LRB):  FUSION, SPINE, POSTERIOR SPINAL COLUMN, CERVICAL, USING COMPUTER-ASSISTED NAVIGATION (Bilateral) 1 Day Post-Op    Plan:     During this hospitalization, patient to be seen   to address the identified rehab impairments via gait training, therapeutic activities, therapeutic exercises and progress toward the following goals:    · Plan of Care Expires:  03/12/19    Subjective     Chief Complaint: PAIN NECK  Patient/Family Comments/goals: INC STRENGTH  Pain/Comfort:  · Pain Rating 1: 4/10  · Location 1: neck  · Pain Rating Post-Intervention 1: 4/10    Patients cultural, spiritual, Rastafari conflicts given the current situation:      Living Environment:  PT LIVES AT HOME WITH WIFE AND HAS NO STEPS TO ENTER A ONE STORY HOME  Prior to admission, patients level of function was MOD I WITH RW.  Equipment used at home: shower chair, walker, rolling.  DME owned (not currently used): single point cane.  Upon discharge, patient will have assistance from WIFE .    Objective:     Communicated with NURSE WHYTE AND [facility] CHART REVIEW prior to session.  Patient found SUP IN BED peripheral IV, cervical  collar  upon PT entry to room.    General Precautions: Standard, fall   Orthopedic Precautions:N/A   Braces: N/A     Exams:  · RLE ROM: LIMITED  · RLE Strength: NA D/T PAIN WITH ROM  · LLE ROM: WFL  · LLE Strength: WFL    Functional Mobility:  PT MET IN RM LOG ROLLED TO RIGHT AND SEATED EOB WITH MOD A AND INC TIME. PT SCOOTED TO EOB WITH SBA. PT STOOD WITH RW AND MIN A FOR GT. PT GT TRAINED X 6' WITH RW AND MIN A AND CUES FOR POSTURE. PT REPORTED KNEES BUCKLING AND DIZZINESS. PT T/F TO CHAIR WITH RW AND MIN A. PT WHEELED NEAR BEDSIDE IN CHAIR. PT EDUCATED ROM EXERCISES AND ROLE OF P.T. PT LEFT SEATED WITH NURSE PRESENT AND CALL BELL IN REACH.     AM-PAC 6 CLICK MOBILITY  Total Score:16     Patient left up in chair with call button in reach.    GOALS:   Multidisciplinary Problems     Physical Therapy Goals        Problem: Physical Therapy Goal    Goal Priority Disciplines Outcome Goal Variances Interventions   Physical Therapy Goal     PT, PT/OT      Description:  PT WILL BE SEEN FOR P.T. FOR A MIN OF 5 OUT OF 7 DAYS A WEEK  LTG: 3/12/19  1. PT WILL COMPLETE BED MOBILITY  WITH SBA.  2. PT WILL T/F TO CHAIR WITH RW AND SBA  3. PT WILL GT TRAIN X 100' WITH RW AND SBA  4. PT WILL COMPLETE B LE TE X 20 REPS                    History:     Past Medical History:   Diagnosis Date    General anesthetics causing adverse effect in therapeutic use     Slow to wake    GERD (gastroesophageal reflux disease)     [place] (hard of hearing)     has hearing aids    Hyperlipidemia     Osteoporosis        Past Surgical History:   Procedure Laterality Date    FUSION, SPINE, POSTERIOR SPINAL COLUMN, CERVICAL, USING COMPUTER-ASSISTED NAVIGATION Bilateral 3/4/2019    Performed by [facility] Wolfe [facility] at [facility]    PROSTATE SURGERY      SINUS SURGERY         Time Tracking:     PT Received On: 03/05/19  PT Start Time: 0925     PT Stop Time: 0950  PT Total Time (min): 25 min     Billable Minutes: Evaluation 15 and Gait Training  10      Mandi Newsome, PT  03/05/2019

## 2019-03-05 NOTE — NURSING
Patient stated Dr. Wolfe asked to keep staley in place. Messaged Dr. Wolfe to clarify. Dr. Wolfe stated to keep staley for today. Will reassess need for staley tomorrow.

## 2019-03-05 NOTE — CHAPLAIN
Initial with patient and family.  Patient and his family were all doing well despite all that is happening with his health.  Patient and his family have strong rimma and have support from their Jewish.  Patient and and his family mentioned that he will be having surgery tomorrow.  I prayed with them before leaving and will follow up a long as patient is in the hospital.  Spiritual care remains available as needed.    Chaplain Robert Sanchez M.Div., BCC

## 2019-03-05 NOTE — NURSING
Upon removal of staley catheter per order, patient requested to keep staley catheter. Informed Dr. Wolfe via secure message. Staley catheter is to remain in place. Education provided. Patient verbalized understanding. Informed day nurseCristela. Will continue to monitor.

## 2019-03-05 NOTE — OP NOTE
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 Bilateral lateral mass screws C3-5  2. Cervical decompression C3-4, 4-5   3. Cervical arthrodesis C3-5    4. Use of stereotactic navigation   5. Use of intraoperative neuro-monitoring     Description of the Findings of the Procedure:  Cervical stenosis C3-5      Complications: No    Estimated Blood Loss (EBL): 25 mL           Specimens:   Specimen (12h ago, onward)    None           Implants:   Implant Name Type Inv. Item Serial No.  Lot No. LRB No. Used   PINS SKULL ADULT SUTTON - [id number]  PINS SKULL ADULT SUTTON  INTE[employer]RA [id number]  1   6cc [facility]   [id number]    1   3.5 x 14mm Screw    [employer]NEURO   6   40mm jaden    [employer]NE[employer]   1   30mm Jaden    [employer]NEURO   1   set screw    [employer]NEURO   6              Condition: Good    Disposition: PACU - hemodynamically stable.    Attestation: I was present and scrubbed for the entire procedure.     Patient is 68-year-old male who presented through the hospital with progressive right-sided weakness.  He was found to have cervical stenosis with myelopathy from C3-5.  He was discuss treatment option and because of the progressive weakness elected to undergo posterior and then anterior decompression and fusion in a two-stage procedure.  The 1st stage procedure being a posterior C3-5 cervical lateral mass fusion.    Surgical Risks:  The patient was well apprised of all  objectives, benefits, risks and potential complications of the procedure, including but not limited to:  Worsening of current status, the possible need for further procedures, the risk of infection, headaches, CSF leak, possible spinal nerve injury resulting in paralysis, infection, injury to major vessels causing hemorrhage, stroke, loss of language function, coma and even death along with difficulty with swallowing. No assurance was given whether the symptoms would improve following the procedure.  Informed consent was obtained and secured to the chart after the patient voiced understanding of these risks and decided to proceed with the operation.     Description of procedure:  The patient was transferred to the operating room and was given preoperative prophylactic IV antibiotics.  The Anesthesia Service sedated and intubated the patient making sure that neck was stable throughout the intubation.  IV steroids were given as well prior to case.  The eyes were taped shut after ointment was applied to prevent corneal abrasion.  A Vinod Hugger was placed over the upper body to maintain control of the core body temperature.  Cervantes catheter was inserted.        The electrophysiology monitoring team inserted needles in their proper locations prior to positioning.  Prior to positioning motor as well as SSEP testing was done.  There were decreased motor responses on Right prior to positioning .  There were strong SSEP readings x4 extremities.  The radiolucent head frame was applied to the patient. The patient was then turned prone on the Thuan table.  All pressure points were carefully padded.  Motor and SSEP readings were stable after turning patient prone.     The patient was prepped and draped in the standard sterile fashion.  Local anesthetic was infiltrated midline C3-C5.  The skin was subsequently opened sharply with a #10.  blade.  Dissection was carried down superiorly and inferiorly in the midline to expose the  supraspinous ligament and the lamina.  The musculature was elevated subperiosteally to expose the lateral masses bilaterally with the Bovie electrocautery as well as the Breaux elevator.  Hemostasis was achieved.  Self retraining retractors were then inserted.     Next the stereotactic reference frame was fastened to the headframe.  Sterile drapes were then prior placed around the operative site.  The O-arm navigation system was then brought into the operative field and intraoperative spin was done.  The O arm was then removed.  Using stereotactic navigation a midas drill was used to place  holes in the lateral masses of C3-C5 bilaterally.  A hand drill was used to drill holes appx 14mm in a direction superior and lateral.  A ball-tip probe was used to feel down the lateral masses  to make sure there was bone all the way around. Next 3.5 x 14mm lateral mass screws were placed bilaterally C3-5     At this point, a high-speed drill was used to drill a lateral trough through the inferior lamina of C3-superior lamina of C6.   Using a Leksell as well as upgoing curettes, lamina was removed 1 piece.  Motor and SSEP readings were stable after performing this maneuver.  Foraminotomies were then performed bilaterally using a Kerrisons.       Once the instrumentation was in place a final AP/ and Lat imaging was obtained to confirm placement.     Next using a Breaux elevator the lateral portion the lateral masses were exposed and decorticated using my assistant's help.  The remaining bone chips from the patient's saved laminectomy bone were placed laterally for the fusion along with the DBM from C3-5.  Next the inter screw distance was measured in the albertina was placed bilaterally into the screws. Inner threaded caps were secured to the polyaxial screws to ensure fixation of the rods.  With the torque wrench we secured the inner threaded caps to the final locked position.     The wound was copiously irrigated with antibiotic  saline solution.  A 1/8 Hemovac drain was placed and brought out through a separate stab incision. 1 g of vancomycin powder was placed to the surgical cavity.  The fascia was subsequently closed utilizing 0 Vicryl sutures.  Exparel was injected into the muscle for postoperative pain control.  The subcuticular layer was closed with a 2 0 Vicryl in an inverted fashion.  The skin was then approximated with staples.  The incision was dressed in a clean and dry dressing.     All sponge counts, needle counts and instrument counts were correct at the end of the case x2.  The patient tolerated the procedure well.  Patient was turned supine on to the preoperative stretcher in the [place] pins were subsequently removed without any complication and was transferred in a stable condition to the recovery room.     There was no motor response in lower extremities prior to and following the operation.  SSEP readings were slightly improved after posterior spinal decompression.

## 2019-03-05 NOTE — PLAN OF CARE
Problem: Adult Inpatient Plan of Care  Goal: Plan of Care Review  Outcome: Ongoing (interventions implemented as appropriate)  POC reviewed, including indications and possible side effects of administered medications. Patient verbalized understanding and teach back. No adverse reactions noted. Patient c/o neck pain and throat discomfort. Administered medications per order. Incision remains CDI. C-collar in place. Neurovascular checks performed. Cervantes catheter and hemovac care performed. Patient tolerating CL diet well. Denies n/v. Will advance to [place] diet. Turning patient every 2 hours using pillows. Patient remains free of falls and injuries during shift. Wife at bedside. Will continue to monitor.     Chart check complete.

## 2019-03-05 NOTE — PLAN OF CARE
Sw met with patient and family at the bedside. Sw explained role/purpose of visit. Pt reports he is independent with ADL's and lives with spouse. Patient denies having HH at this time.  Patient reports he currently has rw and shower chair. Updated white board with 's name and number. Transitional Care Folder, Discharge Planning Begins on Admission pamphlet, [facility], [facility] Bedside Delivery pamphlet, Advance Directive information given to patient along with the contact information given.Instructed patient or family to call with any questions or concerns.    Transportation: family  PCP: Dr. Leonardo Khan  [facility]     03/05/19 2181   Discharge Assessment   Assessment Type Discharge Planning Assessment   Confirmed/corrected address and phone number on facesheet? Yes   Assessment information obtained from? Patient   Prior to hospitilization cognitive status: Alert/Oriented   Prior to hospitalization functional status: Independent   Current cognitive status: Alert/Oriented   Current Functional Status: Independent   Lives With spouse   Able to Return to Prior Arrangements yes   Is patient able to care for self after discharge? Yes   Who are your caregiver(s) and their phone number(s)? Laura Nugent; spouse [phone]   Equipment Currently Used at Home walker, rolling;shower chair   Does the patient have transportation home? Yes   Transportation Anticipated family or friend will provide   Discharge Plan A Home   Discharge Plan B Home with family   Patient/Family in Agreement with Plan yes

## 2019-03-05 NOTE — PLAN OF CARE
03/05/19 1216   Medicare Message   Important Message from Medicare regarding Discharge Appeal Rights Given to patient/caregiver;Explained to patient/caregiver;Signed/date by patient/caregiver   Date IMM was signed 03/05/19   Time IMM was signed 1217

## 2019-03-06 ENCOUNTER — ANESTHESIA EVENT (OUTPATIENT)
Dept: SURGERY | Facility: HOSPITAL | Age: 69
End: 2019-03-06

## 2019-03-06 ENCOUNTER — ANESTHESIA (OUTPATIENT)
Dept: SURGERY | Facility: HOSPITAL | Age: 69
End: 2019-03-06

## 2019-03-06 PROCEDURE — 97530 THERAPEUTIC ACTIVITIES: CPT

## 2019-03-06 PROCEDURE — 97110 THERAPEUTIC EXERCISES: CPT

## 2019-03-06 PROCEDURE — 94799 UNLISTED PULMONARY SVC/PX: CPT

## 2019-03-06 PROCEDURE — 11000001 HC ACUTE MED/SURG PRIVATE ROOM

## 2019-03-06 PROCEDURE — 63600175 PHARM REV CODE 636 W HCPCS: Performed by: NEUROLOGICAL SURGERY

## 2019-03-06 PROCEDURE — 25000003 PHARM REV CODE 250: Performed by: PHYSICIAN ASSISTANT

## 2019-03-06 PROCEDURE — 25000003 PHARM REV CODE 250: Performed by: NEUROLOGICAL SURGERY

## 2019-03-06 RX ORDER — CETIRIZINE HYDROCHLORIDE 10 MG/1
10 TABLET ORAL DAILY
Status: DISCONTINUED | OUTPATIENT
Start: 2019-03-06 | End: 2019-03-09 | Stop reason: HOSPADM

## 2019-03-06 RX ADMIN — HYDROMORPHONE HYDROCHLORIDE 1 MG: 1 INJECTION, SOLUTION INTRAMUSCULAR; INTRAVENOUS; SUBCUTANEOUS at 06:03

## 2019-03-06 RX ADMIN — METHOCARBAMOL TABLETS 750 MG: 750 TABLET, COATED ORAL at 03:03

## 2019-03-06 RX ADMIN — HYDROMORPHONE HYDROCHLORIDE 1 MG: 1 INJECTION, SOLUTION INTRAMUSCULAR; INTRAVENOUS; SUBCUTANEOUS at 12:03

## 2019-03-06 RX ADMIN — ALPRAZOLAM 0.25 MG: 0.25 TABLET ORAL at 01:03

## 2019-03-06 RX ADMIN — HYDROCODONE BITARTRATE AND ACETAMINOPHEN 1 TABLET: 10; 325 TABLET ORAL at 03:03

## 2019-03-06 RX ADMIN — HYDROCODONE BITARTRATE AND ACETAMINOPHEN 1 TABLET: 10; 325 TABLET ORAL at 04:03

## 2019-03-06 RX ADMIN — CETIRIZINE HYDROCHLORIDE 10 MG: 10 TABLET, FILM COATED ORAL at 01:03

## 2019-03-06 RX ADMIN — METHOCARBAMOL TABLETS 750 MG: 750 TABLET, COATED ORAL at 09:03

## 2019-03-06 RX ADMIN — TRAMADOL HYDROCHLORIDE 50 MG: 50 TABLET, COATED ORAL at 03:03

## 2019-03-06 RX ADMIN — PROMETHAZINE HYDROCHLORIDE 6.25 MG: 25 INJECTION INTRAMUSCULAR; INTRAVENOUS at 08:03

## 2019-03-06 NOTE — PT/OT/SLP PROGRESS
Physical Therapy      Patient Name:  Brett Nugent   MRN:  [id number]    ATTEMPTED 2ND P.T. TX. THIS PM, HOLD PER NURSE JULIETH AND CHARGE NURSE ROBBY, PT GOING FOR MRI AT THIS TIME, WILL ASSESS PT NEXT VISIT    Shu Ledesma, PT   3/6/2019  1400

## 2019-03-06 NOTE — PLAN OF CARE
Problem: Adult Inpatient Plan of Care  Goal: Plan of Care Review  Outcome: Ongoing (interventions implemented as appropriate)  Fall precautions maintained. Pt free from falls/injuries.  Patient complains of pain. Pain controlled with PRN meds.  Antibiotics given as prescribed.  Ambulates and repositions with assistance.  Plan of care and medications discussed with patient.  Patient verbalized understanding.  Bed locked and low, call bell within reach.  Chart check done. Will continue to monitor.

## 2019-03-06 NOTE — PLAN OF CARE
Problem: Adult Inpatient Plan of Care  Goal: Plan of Care Review  Outcome: Ongoing (interventions implemented as appropriate)  Incision CDI. Pain controlled with prn meds. Denies n/v.  IVF infusing as ordered. NPO after midnight maintained. C collar in place. Neurovascular checks performed. Turn q2 with use of pillows. VSS. Chart reviewed. Will monitor.

## 2019-03-06 NOTE — CHAPLAIN
Follow up visit with family.  Pt was out for a test but his family was present.  I spent time visiting with them.  They were all doing well and had no spiritual issues. Spiritual care remains available as needed.    Chaplain Robert Sanchez M.Div., BCC

## 2019-03-07 PROCEDURE — 94799 UNLISTED PULMONARY SVC/PX: CPT

## 2019-03-07 PROCEDURE — 25000003 PHARM REV CODE 250: Performed by: NEUROLOGICAL SURGERY

## 2019-03-07 PROCEDURE — 97116 GAIT TRAINING THERAPY: CPT

## 2019-03-07 PROCEDURE — 11000001 HC ACUTE MED/SURG PRIVATE ROOM

## 2019-03-07 PROCEDURE — 97530 THERAPEUTIC ACTIVITIES: CPT

## 2019-03-07 PROCEDURE — 97110 THERAPEUTIC EXERCISES: CPT

## 2019-03-07 RX ORDER — PANTOPRAZOLE SODIUM 40 MG/1
40 TABLET, DELAYED RELEASE ORAL DAILY
Status: DISCONTINUED | OUTPATIENT
Start: 2019-03-07 | End: 2019-03-09 | Stop reason: HOSPADM

## 2019-03-07 RX ORDER — CALCIUM CARBONATE 200(500)MG
500 TABLET,CHEWABLE ORAL DAILY PRN
Status: DISCONTINUED | OUTPATIENT
Start: 2019-03-07 | End: 2019-03-09 | Stop reason: HOSPADM

## 2019-03-07 RX ORDER — BACITRACIN ZINC 500 UNIT/G
OINTMENT (GRAM) TOPICAL DAILY
Status: DISCONTINUED | OUTPATIENT
Start: 2019-03-07 | End: 2019-03-09 | Stop reason: HOSPADM

## 2019-03-07 RX ADMIN — CETIRIZINE HYDROCHLORIDE 10 MG: 10 TABLET, FILM COATED ORAL at 08:03

## 2019-03-07 RX ADMIN — HYDROCODONE BITARTRATE AND ACETAMINOPHEN 1 TABLET: 10; 325 TABLET ORAL at 02:03

## 2019-03-07 RX ADMIN — BACITRACIN ZINC: 500 OINTMENT TOPICAL at 11:03

## 2019-03-07 RX ADMIN — METHOCARBAMOL TABLETS 750 MG: 750 TABLET, COATED ORAL at 02:03

## 2019-03-07 RX ADMIN — HYDROCODONE BITARTRATE AND ACETAMINOPHEN 1 TABLET: 10; 325 TABLET ORAL at 07:03

## 2019-03-07 RX ADMIN — PANTOPRAZOLE SODIUM 40 MG: 40 TABLET, DELAYED RELEASE ORAL at 08:03

## 2019-03-07 RX ADMIN — METHOCARBAMOL TABLETS 750 MG: 750 TABLET, COATED ORAL at 08:03

## 2019-03-07 RX ADMIN — CALCIUM CARBONATE (ANTACID) CHEW TAB 500 MG 500 MG: 500 CHEW TAB at 10:03

## 2019-03-07 NOTE — PLAN OF CARE
Problem: Physical Therapy Goal  Goal: Physical Therapy Goal  PT WILL BE SEEN FOR P.T. FOR A MIN OF 5 OUT OF 7 DAYS A WEEK  LTG: 3/12/19  1. PT WILL COMPLETE BED MOBILITY  WITH SBA.  2. PT WILL T/F TO CHAIR WITH RW AND SBA  3. PT WILL GT TRAIN X 100' WITH RW AND SBA  4. PT WILL COMPLETE B LE TE X 20 REPS   Outcome: Ongoing (interventions implemented as appropriate)  PT GT TRAINED X 80' WITH RW AND CGA

## 2019-03-07 NOTE — PLAN OF CARE
[facility]     03/07/19 1414   Post-Acute Status   Post-Acute Authorization Home Health/Hospice   Home Health/Hospice Status Referrals Sent

## 2019-03-07 NOTE — PLAN OF CARE
03/07/19 1340   Medicare Message   Important Message from Medicare regarding Discharge Appeal Rights Given to patient/caregiver;Explained to patient/caregiver;Signed/date by patient/caregiver   Date IMM was signed 03/07/19   Time IMM was signed 1347

## 2019-03-07 NOTE — PLAN OF CARE
Problem: Physical Therapy Goal  Goal: Physical Therapy Goal  PT WILL BE SEEN FOR P.T. FOR A MIN OF 5 OUT OF 7 DAYS A WEEK  LTG: 3/12/19  1. PT WILL COMPLETE BED MOBILITY  WITH SBA.  2. PT WILL T/F TO CHAIR WITH RW AND SBA  3. PT WILL GT TRAIN X 100' WITH RW AND SBA  4. PT WILL COMPLETE B LE TE X 20 REPS   Outcome: Ongoing (interventions implemented as appropriate)  PT GT TRAINED X 50' WITH RW AND CGA

## 2019-03-07 NOTE — PLAN OF CARE
Problem: Physical Therapy Goal  Goal: Physical Therapy Goal  PT WILL BE SEEN FOR P.T. FOR A MIN OF 5 OUT OF 7 DAYS A WEEK  LTG: 3/12/19  1. PT WILL COMPLETE BED MOBILITY  WITH SBA.  2. PT WILL T/F TO CHAIR WITH RW AND SBA  3. PT WILL GT TRAIN X 100' WITH RW AND SBA  4. PT WILL COMPLETE B LE TE X 20 REPS   Outcome: Ongoing (interventions implemented as appropriate)  PT T/F TO CHAIR WITH RW AND MOD A.

## 2019-03-07 NOTE — NURSING
Patient unable to void 6 hours post catheter removal. Bladder scan showed 400 mL in bladder. Notified Dr. Wolfe, he ordered straight cath and bladder scan again in 6 hrs if unable to void.

## 2019-03-07 NOTE — PLAN OF CARE
Met with patient and wife Laura at bedside to discuss discharge plan. Patient and wife stated that they have talked extensively to Dr. Wolfe regarding recommendations for d/c plan and would like Home Health services rather than placement. Patient and wife specifically requested [facility]. Patient preference form signed, placed in chart. CM sent referral to [facility] via [email or web address]. Requested HH order from Dr. Wolfe.

## 2019-03-07 NOTE — PLAN OF CARE
Problem: Adult Inpatient Plan of Care  Goal: Plan of Care Review  Outcome: Ongoing (interventions implemented as appropriate)  C-collar in place during shift. VSS. Pain moderately controlled during shift. Cervantes in place. Pt repositioned Q2 during shift. Neurovascular checks Q4 hours.  Fall precautions in place. Call light and personal items within reach. Educated patient on side effects of medication administered. Pt verbalized understanding. 24 hour order check done.  Will continue to monitor.

## 2019-03-07 NOTE — PT/OT/SLP PROGRESS
Physical Therapy  Treatment    Brett Nugent   MRN: [id number]   Admitting Diagnosis: <principal problem not specified>    PT Received On: 03/07/19  PT Start Time: 0900     PT Stop Time: 0925    PT Total Time (min): 25 min       Billable Minutes:  Gait Training 15 and Therapeutic Exercise 1    Treatment Type: Treatment  PT/PTA: PT             General Precautions: Standard, fall  Orthopedic Precautions: N/A   Braces: N/A         Subjective:  Communicated with nurse and epic chart review prior to session.  PT AGREED TO TX     Pain/Comfort  Pain Rating 1: 2/10  Location 1: neck  Pain Rating Post-Intervention 1: 3/10    Objective:   Patient found with: peripheral IV, cervical collar    Functional Mobility:  PT MET  IN RM SUP>SIT EOB WITH SBA. PT SCOOTED TO EOB AND STOOD WITH RW AND CGA FOR GT X 50' WITH STEP TO GT AND SLOW PACE. PT RETURNED TO RM T/F TO CHAIR WITH CGA . PT SEATED FOR B LE TE X 15 REPS OF MIP, TKE AND AP. PT LEFT SEATED WITH CALL BELL IN REACH AND ALL NEEDS MET       AM-PAC 6 CLICK MOBILITY  How much help from another person does this patient currently need?   1 = Unable, Total/Dependent Assistance  2 = A lot, Maximum/Moderate Assistance  3 = A little, Minimum/Contact Guard/Supervision  4 = None, Modified [place]/Independent    Turning over in bed (including adjusting bedclothes, sheets and blankets)?: 4  Sitting down on and standing up from a chair with arms (e.g., wheelchair, bedside commode, etc.): 3  Moving from lying on back to sitting on the side of the bed?: 4  Moving to and from a bed to a chair (including a wheelchair)?: 3  Need to walk in hospital room?: 3  Climbing 3-5 steps with a railing?: 1  Basic Mobility Total Score: 18    AM-PAC Raw Score CMS G-Code Modifier Level of Impairment Assistance   6 % Total / Unable   7 - 9 CM 80 - 100% Maximal Assist   10 - 14 CL 60 - 80% Moderate Assist   15 - 19 CK 40 - 60% Moderate Assist   20 - 22 CJ 20 - 40% Minimal Assist   23 CI 1-20% SBA /  CGA   24 CH 0% Independent/ Mod I     Patient left up in chair with call button in reach.    Assessment:  PT PROGRESSING WITH GT.     Rehab identified problem list/impairments: Rehab identified problem list/impairments: weakness, impaired functional mobilty, gait instability, impaired endurance, pain, impaired balance, decreased lower extremity function, impaired self care skills, decreased upper extremity function    Rehab potential is good.    Activity tolerance: Fair    Discharge recommendations: Discharge Facility/Level of Care Needs: other (see comments)(HH P.T.)     Barriers to discharge:      Equipment recommendations: Equipment Needed After Discharge: none     GOALS:   Multidisciplinary Problems     Physical Therapy Goals        Problem: Physical Therapy Goal    Goal Priority Disciplines Outcome Goal Variances Interventions   Physical Therapy Goal     PT, PT/OT Ongoing (interventions implemented as appropriate)     Description:  PT WILL BE SEEN FOR P.T. FOR A MIN OF 5 OUT OF 7 DAYS A WEEK  LTG: 3/12/19  1. PT WILL COMPLETE BED MOBILITY  WITH SBA.  2. PT WILL T/F TO CHAIR WITH RW AND SBA  3. PT WILL GT TRAIN X 100' WITH RW AND SBA  4. PT WILL COMPLETE B LE TE X 20 REPS                    PLAN:    Patient to be seen    to address the above listed problems via gait training, therapeutic activities, therapeutic exercises  Plan of Care expires: 03/12/19  Plan of Care reviewed with: patient         Mandi Newsome, PT  03/07/2019

## 2019-03-07 NOTE — PT/OT/SLP PROGRESS
Physical Therapy  Treatment    Brett Nugent   MRN: [id number]   Admitting Diagnosis: <principal problem not specified>    PT Received On: 03/06/19  PT Start Time: 0740     PT Stop Time: 0805    PT Total Time (min): 25 min       Billable Minutes:  Therapeutic Activity 15 and Therapeutic Exercise 10    Treatment Type: Treatment  PT/PTA: PT             General Precautions: Standard, fall  Orthopedic Precautions: N/A   Braces: N/A         Subjective:  Communicated with NURSE BUSTILLO AND Epic CHART REVIEW prior to session.  PT AGREED TO TX     Pain/Comfort  Pain Rating 1: 3/10  Location 1: neck    Objective:   Patient found with: peripheral IV, cervical collar    Functional Mobility:  PT MET IN RM SUP IN BED AGREED TO TX. PT LOG ROLLED AND SEATED EOB WITH CGA. PT SCOOTED TO EOB AND REPORTED DIZZINESS. PT BP TAKEN @ 169/93 AFTER SITTING SEVERAL MINUTES. PT COMPLETED AP, TKE, AND MIP X 10 REPS. PT STOOD WITH RW AND MOD A FOR T/F TO CHAIR WITH INC WEAKNESS , NAUSEA AND DIZZINESS REPORTED. PT REQUESTED TO STAY UP IN CHAIR THOUGH AND NURSE AWARE. PT LEFT WITH FAMILY PRESENT AND ALL NEEDS MET       AM-PAC 6 CLICK MOBILITY  How much help from another person does this patient currently need?   1 = Unable, Total/Dependent Assistance  2 = A lot, Maximum/Moderate Assistance  3 = A little, Minimum/Contact Guard/Supervision  4 = None, Modified [place]/Independent    Turning over in bed (including adjusting bedclothes, sheets and blankets)?: 3  Sitting down on and standing up from a chair with arms (e.g., wheelchair, bedside commode, etc.): 2  Moving from lying on back to sitting on the side of the bed?: 3  Moving to and from a bed to a chair (including a wheelchair)?: 2  Need to walk in hospital room?: 1  Climbing 3-5 steps with a railing?: 1  Basic Mobility Total Score: 12    AM-PAC Raw Score CMS G-Code Modifier Level of Impairment Assistance   6 % Total / Unable   7 - 9 CM 80 - 100% Maximal Assist   10 - 14 CL 60 - 80%  Moderate Assist   15 - 19 CK 40 - 60% Moderate Assist   20 - 22 CJ 20 - 40% Minimal Assist   23 CI 1-20% SBA / CGA   24 CH 0% Independent/ Mod I     Patient left up in chair with all lines intact, call button in reach and FAMILY present.    Assessment:  PT UNABLE TO GT TRAIN THIS TX. PT WITH INC WEAKNESS, DIZZINESS AND NAUSEA    Rehab identified problem list/impairments: Rehab identified problem list/impairments: weakness, impaired self care skills, impaired balance, impaired functional mobilty, impaired endurance, gait instability, decreased lower extremity function, decreased upper extremity function, pain, decreased ROM    Rehab potential is good.    Activity tolerance: Poor    Discharge recommendations: Discharge Facility/Level of Care Needs: rehabilitation facility     Barriers to discharge:      Equipment recommendations: Equipment Needed After Discharge: none     GOALS:   Multidisciplinary Problems     Physical Therapy Goals        Problem: Physical Therapy Goal    Goal Priority Disciplines Outcome Goal Variances Interventions   Physical Therapy Goal     PT, PT/OT Ongoing (interventions implemented as appropriate)     Description:  PT WILL BE SEEN FOR P.T. FOR A MIN OF 5 OUT OF 7 DAYS A WEEK  LTG: 3/12/19  1. PT WILL COMPLETE BED MOBILITY  WITH SBA.  2. PT WILL T/F TO CHAIR WITH RW AND SBA  3. PT WILL GT TRAIN X 100' WITH RW AND SBA  4. PT WILL COMPLETE B LE TE X 20 REPS                    PLAN:    Patient to be seen    to address the above listed problems via gait training, therapeutic activities, therapeutic exercises  Plan of Care expires: 03/12/19  Plan of Care reviewed with: patient         Mandi Newsome, PT  03/06/2019

## 2019-03-07 NOTE — PT/OT/SLP PROGRESS
Physical Therapy  Treatment    Brett Nugent   MRN: [id number]   Admitting Diagnosis: <principal problem not specified>    PT Received On: 03/07/19  PT Start Time: 1340     PT Stop Time: 1405    PT Total Time (min): 25 min       Billable Minutes:  Gait Training 15 and Therapeutic Activity 10    Treatment Type: Treatment  PT/PTA: PT             General Precautions: Standard, fall  Orthopedic Precautions: N/A   Braces: N/A         Subjective:  Communicated with NURSE CLANCY AND Epic CHART REVIEW prior to session.  PT AGREED TO TX     Pain/Comfort  Pain Rating 1: 4/10  Location 1: neck  Pain Rating Post-Intervention 1: 4/10    Objective:   Patient found with: peripheral IV, cervical collar    Functional Mobility:  PT MET IN RM SEATED IN CHAIR. PT SCOOTED TO EOB AND STOOD WITH RW AND CGA. PT GT TRAINED X 80' WITH SLOW PACE GT AND RETURNED TO RM T/F TO EOB WITH CGA. PT SCOOTED BACK IN BED AND SUP IN BED WITH SBA. PT LEFT SUP WITH ALL NEEDS MET AND CALL BELL IN REACH.     AM-PAC 6 CLICK MOBILITY  How much help from another person does this patient currently need?   1 = Unable, Total/Dependent Assistance  2 = A lot, Maximum/Moderate Assistance  3 = A little, Minimum/Contact Guard/Supervision  4 = None, Modified [place]/Independent    Turning over in bed (including adjusting bedclothes, sheets and blankets)?: 4  Sitting down on and standing up from a chair with arms (e.g., wheelchair, bedside commode, etc.): 3  Moving from lying on back to sitting on the side of the bed?: 4  Moving to and from a bed to a chair (including a wheelchair)?: 3  Need to walk in hospital room?: 3  Climbing 3-5 steps with a railing?: 1  Basic Mobility Total Score: 18    AM-PAC Raw Score CMS G-Code Modifier Level of Impairment Assistance   6 % Total / Unable   7 - 9 CM 80 - 100% Maximal Assist   10 - 14 CL 60 - 80% Moderate Assist   15 - 19 CK 40 - 60% Moderate Assist   20 - 22 CJ 20 - 40% Minimal Assist   23 CI 1-20% SBA / CGA   24 CH  0% Independent/ Mod I     Patient left supine with call button in reach and WIFE present.    Assessment:  PT PROGRESSING WITH GT AND GROSS FUNC MOBILITY     Rehab identified problem list/impairments: Rehab identified problem list/impairments: weakness, gait instability, decreased lower extremity function, impaired balance, impaired endurance, impaired functional mobilty, impaired self care skills, decreased ROM, pain    Rehab potential is good.    Activity tolerance: Fair    Discharge recommendations: Discharge Facility/Level of Care Needs: other (see comments)([facility]     Barriers to discharge:      Equipment recommendations: Equipment Needed After Discharge: none     GOALS:   Multidisciplinary Problems     Physical Therapy Goals        Problem: Physical Therapy Goal    Goal Priority Disciplines Outcome Goal Variances Interventions   Physical Therapy Goal     PT, PT/OT Ongoing (interventions implemented as appropriate)     Description:  PT WILL BE SEEN FOR P.T. FOR A MIN OF 5 OUT OF 7 DAYS A WEEK  LTG: 3/12/19  1. PT WILL COMPLETE BED MOBILITY  WITH SBA.  2. PT WILL T/F TO CHAIR WITH RW AND SBA  3. PT WILL GT TRAIN X 100' WITH RW AND SBA  4. PT WILL COMPLETE B LE TE X 20 REPS                    PLAN:    Patient to be seen to address the above listed problems via gait training, therapeutic activities, therapeutic exercises  Plan of Care expires: 03/12/19  Plan of Care reviewed with: patient         Mandi Newsome, PT  03/07/2019

## 2019-03-07 NOTE — PLAN OF CARE
Problem: Adult Inpatient Plan of Care  Goal: Plan of Care Review  Outcome: Ongoing (interventions implemented as appropriate)  Plan of care reviewed with patient; verbalized understanding. Fall precautions maintained, patient remains free from injury. Pain being managed appropriately. Medications being administered as ordered. Dressing to posterior neck changed per order. Patient ambulating with PT. Vital signs stable with no signs of distress noted. Bed low and locked with call light in reach. Will continue monitor.

## 2019-03-08 ENCOUNTER — PATIENT MESSAGE (OUTPATIENT)
Dept: NEUROSURGERY | Facility: CLINIC | Age: 69
End: 2019-03-08

## 2019-03-08 PROCEDURE — 94760 N-INVAS EAR/PLS OXIMETRY 1: CPT

## 2019-03-08 PROCEDURE — 11000001 HC ACUTE MED/SURG PRIVATE ROOM

## 2019-03-08 PROCEDURE — 97116 GAIT TRAINING THERAPY: CPT

## 2019-03-08 PROCEDURE — 97110 THERAPEUTIC EXERCISES: CPT

## 2019-03-08 PROCEDURE — 25000003 PHARM REV CODE 250: Performed by: PHYSICIAN ASSISTANT

## 2019-03-08 PROCEDURE — 25000003 PHARM REV CODE 250: Performed by: NEUROLOGICAL SURGERY

## 2019-03-08 RX ADMIN — SENNOSIDES AND DOCUSATE SODIUM 2 TABLET: 8.6; 5 TABLET ORAL at 02:03

## 2019-03-08 RX ADMIN — CETIRIZINE HYDROCHLORIDE 10 MG: 10 TABLET, FILM COATED ORAL at 08:03

## 2019-03-08 RX ADMIN — PANTOPRAZOLE SODIUM 40 MG: 40 TABLET, DELAYED RELEASE ORAL at 08:03

## 2019-03-08 RX ADMIN — HYDROCODONE BITARTRATE AND ACETAMINOPHEN 1 TABLET: 10; 325 TABLET ORAL at 11:03

## 2019-03-08 RX ADMIN — ALPRAZOLAM 0.25 MG: 0.25 TABLET ORAL at 09:03

## 2019-03-08 RX ADMIN — HYDROCODONE BITARTRATE AND ACETAMINOPHEN 1 TABLET: 10; 325 TABLET ORAL at 03:03

## 2019-03-08 RX ADMIN — ALPRAZOLAM 0.25 MG: 0.25 TABLET ORAL at 02:03

## 2019-03-08 RX ADMIN — HYDROCODONE BITARTRATE AND ACETAMINOPHEN 1 TABLET: 10; 325 TABLET ORAL at 10:03

## 2019-03-08 RX ADMIN — HYDROCODONE BITARTRATE AND ACETAMINOPHEN 1 TABLET: 10; 325 TABLET ORAL at 06:03

## 2019-03-08 RX ADMIN — METHOCARBAMOL TABLETS 750 MG: 750 TABLET, COATED ORAL at 08:03

## 2019-03-08 RX ADMIN — METHOCARBAMOL TABLETS 750 MG: 750 TABLET, COATED ORAL at 02:03

## 2019-03-08 RX ADMIN — METHOCARBAMOL TABLETS 750 MG: 750 TABLET, COATED ORAL at 09:03

## 2019-03-08 NOTE — PLAN OF CARE
Problem: Adult Inpatient Plan of Care  Goal: Plan of Care Review  Outcome: Ongoing (interventions implemented as appropriate)  Per pt's request, pt up in recliner. While assisting pt back to bed, pt c/o dizziness and weakness. Reinforced side-effects of xanax and pain meds. Pt ambulated to bed with 2 assist. Pt appears to be resting at this point. Pt voided w/out difficulty during shift. Pt unable to turn to left side due to pain. VSS. Pt remained free from falls.

## 2019-03-08 NOTE — PLAN OF CARE
Problem: Physical Therapy Goal  Goal: Physical Therapy Goal  PT WILL BE SEEN FOR P.T. FOR A MIN OF 5 OUT OF 7 DAYS A WEEK  LTG: 3/12/19  1. PT WILL COMPLETE BED MOBILITY  WITH SBA.  2. PT WILL T/F TO CHAIR WITH RW AND SBA  3. PT WILL GT TRAIN X 100' WITH RW AND SBA  4. PT WILL COMPLETE B LE TE X 20 REPS   Outcome: Ongoing (interventions implemented as appropriate)  PATIENT PARTICIPATING WELL WITH OOB T/FS, GT INTO HALLWAY WITH RW.

## 2019-03-08 NOTE — PT/OT/SLP PROGRESS
Physical Therapy  Treatment    Brett Nugent   MRN: [id number]   Admitting Diagnosis: <principal problem not specified>    PT Received On: 03/08/19  PT Start Time: 0935     PT Stop Time: 1000    PT Total Time (min): 25 min       Billable Minutes:  Gait Training 15 and Therapeutic Exercise 10    Treatment Type: Treatment  PT/PTA: PTA             General Precautions: Standard, fall  Orthopedic Precautions: N/A   Braces: N/A    Spiritual, Cultural Beliefs, [employer]aritan Practices, Values that Affect Care: no    Subjective:  Communicated with Epic AND NURSEIWONA prior to session.  PATIENT AGREE TO TX NOW.    Pain/Comfort  Pain Rating 1: 8/10  Location - Side 1: Bilateral  Location - Orientation 1: upper  Location 1: shoulder(SHOULDER AND NECK)  Pain Addressed 1: Pre-medicate for activity, Reposition, Distraction, Cessation of Activity  Pain Rating Post-Intervention 1: 8/10    Objective:   Patient found with: peripheral IV, cervical collar, SUPINE IN BED, ASSISTED TO SHORTSEATED EOB GEORGIE LE EXERCISE INSTRUCTION, CERVICAL COLLAR DONNED ALL TX, GT INTO HALLWAY WITH RW 75'X2 , GOOD STEADY PACE AT CGAX1. ASSISTED TO B/S CHAIR AT CGAX1 TO MIN ASSISTX1 FOR SET UP OF CHAIR.     Functional Mobility:  Bed Mobility:    SUPINE TO SIT AT CGAX 1 TO SBAX1    Transfers:   SIT TO STAND, STAND TO SIT AT CGAX1    Gait:    AMBULATE INTO HALLWAY AT CGAX1 WITH RW AT 75'X2, GOOD STEADY PACE, SLOW GT KAYLAN.    Balance:   Static Sit: FAIR: Maintains without assist, but unable to take any challenges   Dynamic Sit: FAIR+: Maintains balance through MINIMAL excursions of active trunk motion  Static Stand: FAIR: Maintains without assist but unable to take challenges  Dynamic stand: FAIR: Needs CONTACT GUARD during gait     Therapeutic Activities and Exercises:  GEORGIE LE EXERCISES, OOB T/FS, GT INTO HALLWAY.  AM-PAC 6 CLICK MOBILITY  How much help from another person does this patient currently need?   1 = Unable, Total/Dependent Assistance  2 = A lot,  Maximum/Moderate Assistance  3 = A little, Minimum/Contact Guard/Supervision  4 = None, Modified [place]/Independent    Turning over in bed (including adjusting bedclothes, sheets and blankets)?: 4  Sitting down on and standing up from a chair with arms (e.g., wheelchair, bedside commode, etc.): 3  Moving from lying on back to sitting on the side of the bed?: 4  Moving to and from a bed to a chair (including a wheelchair)?: 3  Need to walk in hospital room?: 3  Climbing 3-5 steps with a railing?: 1  Basic Mobility Total Score: 18    AM-PAC Raw Score CMS G-Code Modifier Level of Impairment Assistance   6 % Total / Unable   7 - 9 CM 80 - 100% Maximal Assist   10 - 14 CL 60 - 80% Moderate Assist   15 - 19 CK 40 - 60% Moderate Assist   20 - 22 CJ 20 - 40% Minimal Assist   23 CI 1-20% SBA / CGA   24 CH 0% Independent/ Mod I     Patient left up in chair with all lines intact and call button in reach.    Assessment:  Brett Nugent is a 68 y.o. male with a medical diagnosis of <principal problem not specified> .    Rehab identified problem list/impairments: Rehab identified problem list/impairments: weakness, gait instability, decreased upper extremity function, impaired endurance, impaired balance, decreased ROM, impaired self care skills, impaired sensation, impaired functional mobilty, pain    Rehab potential is excellent.    Activity tolerance: Excellent    Discharge recommendations:       Barriers to discharge:      Equipment recommendations: Equipment Needed After Discharge: none     GOALS:   Multidisciplinary Problems     Physical Therapy Goals        Problem: Physical Therapy Goal    Goal Priority Disciplines Outcome Goal Variances Interventions   Physical Therapy Goal     PT, PT/OT Ongoing (interventions implemented as appropriate)     Description:  PT WILL BE SEEN FOR P.T. FOR A MIN OF 5 OUT OF 7 DAYS A WEEK  LTG: 3/12/19  1. PT WILL COMPLETE BED MOBILITY  WITH SBA.  2. PT WILL T/F TO CHAIR WITH RW  AND SBA  3. PT WILL GT TRAIN X 100' WITH RW AND SBA  4. PT WILL COMPLETE B LE TE X 20 REPS                    PLAN:    Patient to be seen 5 x/week  to address the above listed problems via gait training, therapeutic activities, therapeutic exercises  Plan of Care expires: 03/12/19  Plan of Care reviewed with: patient         Ingrid Ramesh, PTA  03/08/2019

## 2019-03-09 VITALS
WEIGHT: 157.63 LBS | HEART RATE: 71 BPM | DIASTOLIC BLOOD PRESSURE: 76 MMHG | TEMPERATURE: 99 F | BODY MASS INDEX: 20.23 KG/M2 | OXYGEN SATURATION: 97 % | HEIGHT: 74 IN | SYSTOLIC BLOOD PRESSURE: 141 MMHG | RESPIRATION RATE: 18 BRPM

## 2019-03-09 PROBLEM — G99.2 STENOSIS OF CERVICAL SPINE WITH MYELOPATHY: Status: ACTIVE | Noted: 2019-03-09

## 2019-03-09 PROBLEM — M48.02 STENOSIS OF CERVICAL SPINE WITH MYELOPATHY: Status: ACTIVE | Noted: 2019-03-09

## 2019-03-09 PROCEDURE — 97116 GAIT TRAINING THERAPY: CPT

## 2019-03-09 PROCEDURE — 25000003 PHARM REV CODE 250: Performed by: NEUROLOGICAL SURGERY

## 2019-03-09 PROCEDURE — 97110 THERAPEUTIC EXERCISES: CPT

## 2019-03-09 RX ORDER — AMOXICILLIN 250 MG
2 CAPSULE ORAL NIGHTLY PRN
COMMUNITY
Start: 2019-03-09 | End: 2019-03-27

## 2019-03-09 RX ORDER — BACITRACIN ZINC 500 UNIT/G
OINTMENT (GRAM) TOPICAL DAILY
Refills: 0 | COMMUNITY
Start: 2019-03-09 | End: 2019-03-09

## 2019-03-09 RX ORDER — ALPRAZOLAM 0.25 MG/1
0.25 TABLET ORAL 3 TIMES DAILY PRN
Qty: 60 TABLET | Refills: 0 | Status: SHIPPED | OUTPATIENT
Start: 2019-03-09 | End: 2019-03-09

## 2019-03-09 RX ORDER — ALPRAZOLAM 0.25 MG/1
0.25 TABLET ORAL 3 TIMES DAILY PRN
Qty: 60 TABLET | Refills: 0 | Status: SHIPPED | OUTPATIENT
Start: 2019-03-09 | End: 2019-05-28

## 2019-03-09 RX ORDER — BACITRACIN ZINC 500 UNIT/G
OINTMENT (GRAM) TOPICAL DAILY
Refills: 0 | COMMUNITY
Start: 2019-03-09 | End: 2019-04-24

## 2019-03-09 RX ORDER — HYDROCODONE BITARTRATE AND ACETAMINOPHEN 10; 325 MG/1; MG/1
1 TABLET ORAL EVERY 4 HOURS PRN
Qty: 60 TABLET | Refills: 0 | Status: SHIPPED | OUTPATIENT
Start: 2019-03-09 | End: 2019-03-11 | Stop reason: SDUPTHER

## 2019-03-09 RX ORDER — AMOXICILLIN 250 MG
2 CAPSULE ORAL NIGHTLY PRN
COMMUNITY
Start: 2019-03-09 | End: 2019-03-09

## 2019-03-09 RX ORDER — HYDROCODONE BITARTRATE AND ACETAMINOPHEN 10; 325 MG/1; MG/1
1 TABLET ORAL EVERY 4 HOURS PRN
Qty: 60 TABLET | Refills: 0 | Status: SHIPPED | OUTPATIENT
Start: 2019-03-09 | End: 2019-03-09

## 2019-03-09 RX ORDER — METHOCARBAMOL 750 MG/1
750 TABLET, FILM COATED ORAL 3 TIMES DAILY
Qty: 60 TABLET | Refills: 0 | Status: SHIPPED | OUTPATIENT
Start: 2019-03-09 | End: 2019-03-09

## 2019-03-09 RX ORDER — BISACODYL 5 MG
10 TABLET, DELAYED RELEASE (ENTERIC COATED) ORAL 2 TIMES DAILY
Qty: 20 TABLET | Refills: 0 | COMMUNITY
Start: 2019-03-09 | End: 2019-08-28

## 2019-03-09 RX ORDER — METHOCARBAMOL 750 MG/1
750 TABLET, FILM COATED ORAL 3 TIMES DAILY
Qty: 60 TABLET | Refills: 0 | Status: SHIPPED | OUTPATIENT
Start: 2019-03-09 | End: 2019-03-19

## 2019-03-09 RX ADMIN — SENNOSIDES AND DOCUSATE SODIUM 2 TABLET: 8.6; 5 TABLET ORAL at 09:03

## 2019-03-09 RX ADMIN — HYDROCODONE BITARTRATE AND ACETAMINOPHEN 1 TABLET: 10; 325 TABLET ORAL at 09:03

## 2019-03-09 RX ADMIN — CETIRIZINE HYDROCHLORIDE 10 MG: 10 TABLET, FILM COATED ORAL at 08:03

## 2019-03-09 RX ADMIN — METHOCARBAMOL TABLETS 750 MG: 750 TABLET, COATED ORAL at 08:03

## 2019-03-09 RX ADMIN — HYDROCODONE BITARTRATE AND ACETAMINOPHEN 1 TABLET: 10; 325 TABLET ORAL at 06:03

## 2019-03-09 RX ADMIN — METHOCARBAMOL TABLETS 750 MG: 750 TABLET, COATED ORAL at 03:03

## 2019-03-09 RX ADMIN — PANTOPRAZOLE SODIUM 40 MG: 40 TABLET, DELAYED RELEASE ORAL at 08:03

## 2019-03-09 NOTE — PLAN OF CARE
Problem: Physical Therapy Goal  Goal: Physical Therapy Goal  PT WILL BE SEEN FOR P.T. FOR A MIN OF 5 OUT OF 7 DAYS A WEEK  LTG: 3/12/19  1. PT WILL COMPLETE BED MOBILITY  WITH SBA.  2. PT WILL T/F TO CHAIR WITH RW AND SBA  3. PT WILL GT TRAIN X 100' WITH RW AND SBA  4. PT WILL COMPLETE B LE TE X 20 REPS   Outcome: Ongoing (interventions implemented as appropriate)  PATIENT PROGRESSING WELL TOWARD ALL GOALS, PARTICIPATING WELL WITH GT , OOB T/FS.

## 2019-03-09 NOTE — PLAN OF CARE
Problem: Adult Inpatient Plan of Care  Goal: Patient-Specific Goal (Individualization)  Outcome: Ongoing (interventions implemented as appropriate)  Reviewed POC,  Including indications and possible side effects of administer medications. Pt. Verbalized understanding and teach back.  Remain free from injury. Bed low and lock, SRx2 up and call bell within reach.Patient doing well this shift.  IVF and antibiotics infusing as ordered.  Vital signs stable.  Pain well controlled.  No acute distress noted.  Will continue to monitor. 24 hour chart check performed. Dressing change to left great toe as ordered.      Chart Check complete.

## 2019-03-09 NOTE — PT/OT/SLP PROGRESS
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  PATIENT AGREE TO TX NOW.    Pain/Comfort  Pain Rating 1: 0/10    Objective:   Patient found with: peripheral IV, cervical collar , SHORTSEATED, ASSISTED PATIENT FOR GT IN HALLWAY 100'X3 , SEVERAL STANDING REST PERIODS AT CGAX1 ,CUES FOR SAFE BODY ALIGNMENT WITH AD. INSTRUCTED PATIENT WITH  GEORGIE LE EXERCISE SHORTSEATED ALL AVAILABLE PLANES . CERVICAL COLLAR IN PLACE AT TX.        Transfers:   SIT TO STAND, STAND TO SIT AT CGAX1  Gait:     AMBULATE AT CGAX1 WITH RW.    Balance:   Static Sit: GOOD-: Takes MODERATE challenges from all directions but inconsistently  Dynamic Sit: GOOD-: Incosistently Maintains balance through MODERATE excursions of active trunk movement,     Static Stand: GOOD-: Takes MODERATE challenges from all directions inconsistently  Dynamic stand: GOOD-: Needs SUPERVISION only during gait and able to self right with moderate LOB inconsistently     Therapeutic Activities and Exercises:  T/FS, LE EXERCISES, GT INTO HALLWAY.    AM-PAC 6 CLICK MOBILITY  How much help from another person does this patient currently need?   1 = Unable, Total/Dependent Assistance  2 = A lot, Maximum/Moderate Assistance  3 = A little, Minimum/Contact Guard/Supervision  4 = None, Modified [place]/Independent    Turning over in bed (including adjusting bedclothes, sheets and blankets)?: 4  Sitting  down on and standing up from a chair with arms (e.g., wheelchair, bedside commode, etc.): 3  Moving to and from a bed to a chair (including a wheelchair)?: 3  Need to walk in hospital room?: 3  Climbing 3-5 steps with a railing?: 1    AM-PAC Raw Score CMS G-Code Modifier Level of Impairment Assistance   6 % Total / Unable   7 - 9 CM 80 - 100% Maximal Assist   10 - 14 CL 60 - 80% Moderate Assist   15 - 19 CK 40 - 60% Moderate Assist   20 - 22 CJ 20 - 40% Minimal Assist   23 CI 1-20% SBA / CGA   24 CH 0% Independent/ Mod I     Patient left up in chair with all lines intact and call button in reach, WIFE PRESENT.    Assessment:  Brett Nugent is a 68 y.o. male with a medical diagnosis of Stenosis of cervical spine with myelopathy ..    Rehab identified problem list/impairments: Rehab identified problem list/impairments: weakness, impaired self care skills, impaired balance, impaired endurance, impaired functional mobilty, impaired sensation, gait instability, decreased upper extremity function, decreased ROM    Rehab potential is excellent.    Activity tolerance: Excellent    Discharge recommendations:       Barriers to discharge:      Equipment recommendations: Equipment Needed After Discharge: none     GOALS:   Multidisciplinary Problems     Physical Therapy Goals        Problem: Physical Therapy Goal    Goal Priority Disciplines Outcome Goal Variances Interventions   Physical Therapy Goal     PT, PT/OT Ongoing (interventions implemented as appropriate)     Description:  PT WILL BE SEEN FOR P.T. FOR A MIN OF 5 OUT OF 7 DAYS A WEEK  LTG: 3/12/19  1. PT WILL COMPLETE BED MOBILITY  WITH SBA.  2. PT WILL T/F TO CHAIR WITH RW AND SBA  3. PT WILL GT TRAIN X 100' WITH RW AND SBA  4. PT WILL COMPLETE B LE TE X 20 REPS                    PLAN:    Patient to be seen 5 x/week  to address the above listed problems via gait training, therapeutic activities, therapeutic exercises  Plan of Care expires: 03/12/19  Plan of  Care reviewed with: patient, spouse         Ingrid Domitila, PTA  03/09/2019

## 2019-03-09 NOTE — DISCHARGE SUMMARY
[facility]  Discharge Note  Short Stay    Admit Date: 3/4/2019    Discharge Date and Time: No discharge date for patient encounter.     Attending Physician: Helio Wolfe MD     Discharge Provider: Helio Wolfe    Diagnoses:  Active Hospital Problems    Diagnosis  POA    *Stenosis of cervical spine with myelopathy [M47.12]  Unknown    Degenerative disc disease, cervical [M50.30]  Yes      Resolved Hospital Problems   No resolved problems to display.       Discharged Condition: good    Hospital Course: Patient was admitted for inpatient procedure without any complications.  Postoperatively he had some dizziness which was evaluated with medication change as well as CT scan an MRI of the cervical spine.  No complications were noted.  Patient symptoms after medications were changed.  He had a Cervantes catheter in place with a history of prostate cancer and hypertrophy in the past.  He had urinary retention for 1 day which improved after straight catheterization.  On the morning of Saturday March 9th patient was voiding without difficulty.  Neck pain was controlled.  No other complaints noted.  Patient was to be discharged home with home health physical therapy.    Final Diagnoses: Same as principal problem.    Disposition: Home-Health Care [facility]    Follow up/Patient Instructions:    Medications:  Reconciled Home Medications:      Medication List      START taking these medications    ALPRAZolam 0.25 MG tablet  Commonly known as:  XANAX  Take 1 tablet (0.25 mg total) by mouth 3 (three) times daily as needed for Anxiety or Insomnia.     artificial tears 0.5 % ophthalmic solution  Commonly known as:  ISOPTO TEARS  Place 1 drop into both eyes as needed.     bacitracin 500 unit/gram Oint  Apply topically once daily.     bisacodyl 5 mg EC tablet  Commonly known as:  DULCOLAX  Take 2 tablets (10 mg total) by mouth 2 (two) times daily.     HYDROcodone-acetaminophen  mg per tablet  Commonly known as:   NORCO  Take 1 tablet by mouth every 4 (four) hours as needed.     methocarbamol 750 MG Tab  Commonly known as:  ROBAXIN  Take 1 tablet (750 mg total) by mouth 3 (three) times daily. for 10 days     nozaseptin nasal   Commonly known as:  NOZIN  1 each by Each Nare route On call Procedure (surgery).     senna-docusate 8.6-50 mg 8.6-50 mg per tablet  Commonly known as:  PERICOLACE  Take 2 tablets by mouth nightly as needed for Constipation.        CHANGE how you take these medications    simvastatin 40 MG tablet  Commonly known as:  ZOCOR  Take 1 tablet (40 mg total) by mouth every evening.  What changed:  when to take this        CONTINUE taking these medications    calcium carbonate 500 mg calcium (1,250 mg) tablet  Commonly known as:  OS-JURGEN  Take 1 tablet by mouth once daily.     cycloSPORINE 0.05 % ophthalmic emulsion  Commonly known as:  RESTASIS  Place 1 drop into both eyes 2 (two) times daily.     esomeprazole 40 MG capsule  Commonly known as:  NEXIUM  Take 1 capsule (40 mg total) by mouth once daily.     vitamin D 1000 units Tab  Commonly known as:  VITAMIN D3  Take 1,000 Units by mouth once daily.        STOP taking these medications    aspirin 81 MG EC tablet  Commonly known as:  ECOTRIN          Discharge Procedure Orders   Diet Adult Regular     HOME HEALTH ORDERS   Order Comments: Subsequent Home Health Orders    Current Medications:  Current Facility-Administered Medications:  acetaminophen tablet 650 mg, 650 mg, Oral, Q6H PRN, Helio Wolfe MD, 650 mg at 03/05/19 0735  ALPRAZolam tablet 0.25 mg, 0.25 mg, Oral, TID PRN, Kathy Elizabeth PA-C, 0.25 mg at 03/06/19 1318  artificial tears 0.5 % ophthalmic solution 1 drop, 1 drop, Both Eyes, PRN, Helio Wolfe MD, 1 drop at 03/05/19 1450  bacitracin ointment, , Topical (Top), Daily, Helio Wolfe MD  cetirizine tablet 10 mg, 10 mg, Oral, Daily, Helio Wolfe MD, 10 mg at 03/07/19 0819  chlorhexidine 0.12 % solution 10 mL, 10 mL,  Mouth/Throat, On Call Procedure, Kathy Elizabeth PA-C  hydrALAZINE injection 10 mg, 10 mg, Intravenous, Q6H PRN, Helio Wolfe MD  HYDROcodone-acetaminophen  mg per tablet 1 tablet, 1 tablet, Oral, Q4H PRN, Helio Wolfe MD, 1 tablet at 03/07/19 1405  hydromorphone (PF) injection 2 mg, 2 mg, Intravenous, Q3H PRN, Helio Wolfe MD  HYDROmorphone injection 1 mg, 1 mg, Intravenous, Q3H PRN, Helio Wolfe MD, 1 mg at 03/06/19 0607  lidocaine (PF) 10 mg/ml (1%) injection 10 mg, 1 mL, Intradermal, Once, Homer Johnson,   methocarbamol tablet 750 mg, 750 mg, Oral, TID, Helio Wolfe MD, 750 mg at 03/07/19 1405  nozaseptin (NOZIN) nasal , , Each Nare, On Call Procedure, Kathy Elizabeth PA-C  promethazine (PHENERGAN) 6.25 mg in dextrose 5 % 50 mL IVPB, 6.25 mg, Intravenous, Q6H PRN, Helio Wolfe MD, Last Rate: 150 mL/hr at 03/06/19 0836, 6.25 mg at 03/06/19 0836  senna-docusate 8.6-50 mg per tablet 2 tablet, 2 tablet, Oral, Nightly PRN, Helio Wolfe MD  traMADol tablet 50 mg, 50 mg, Oral, Q6H PRN, Kathy Elizabeth PA-C, 50 mg at 03/06/19 1540        Nursing:   Wound Care Orders:  yes:  Place thin layer of bacitracin ointment to wound and cover with bandage     Diet:   regular diet    Activities:   activity as tolerated    Referrals/ Consults  Physical Therapy to evaluate and treat. Evaluate for home safety and equipment needs; Establish/upgrade home exercise program. Perform / instruct on therapeutic exercises, gait training, transfer training, and Range of Motion.    Home Health Aide:  Physical Therapy Three times weekly     Order Specific Question Answer Comments   What Home Health Agency is the patient currently using? [facility]      Lifting restrictions   Order Comments: No lifting greater than 10 lbs     Notify your health care provider if you experience any of the following:  temperature >100.4     Notify your health care provider if you  experience any of the following:  severe persistent headache     Notify your health care provider if you experience any of the following:  persistent dizziness, light-headedness, or visual disturbances     Notify your health care provider if you experience any of the following:  redness, tenderness, or signs of infection (pain, swelling, redness, odor or green/yellow discharge around incision site)     Notify your health care provider if you experience any of the following:  increased confusion or weakness     Change dressing (specify)   Order Comments: Dressing change: once a day.  Apply thin layer of bacitracin ointment and cover with a dressing and tape   OK to have bath without c collar  Keep  Incision clean and dry     Activity as tolerated     Follow-up Information     [facility].    Specialty:  Home Health Services  Why:  Home Health will contact patient upon d/c to initiate visits  Contact information:  [address], [address]  [place] [facility] [address]  [phone]                   Discharge Procedure Orders (must include Diet, Follow-up, Activity):   Discharge Procedure Orders (must include Diet, Follow-up, Activity)   Diet Adult Regular     HOME HEALTH ORDERS   Order Comments: Subsequent Home Health Orders    Current Medications:  Current Facility-Administered Medications:  acetaminophen tablet 650 mg, 650 mg, Oral, Q6H PRN, Helio Wolfe MD, 650 mg at 03/05/19 0735  ALPRAZolam tablet 0.25 mg, 0.25 mg, Oral, TID PRN, Kathy Elizabeth PA-C, 0.25 mg at 03/06/19 1318  artificial tears 0.5 % ophthalmic solution 1 drop, 1 drop, Both Eyes, PRN, Helio Wolfe MD, 1 drop at 03/05/19 1450  bacitracin ointment, , Topical (Top), Daily, Helio Wolfe MD  cetirizine tablet 10 mg, 10 mg, Oral, Daily, Helio Wolfe MD, 10 mg at 03/07/19 0819  chlorhexidine 0.12 % solution 10 mL, 10 mL, Mouth/Throat, On Call Procedure, Kathy Elizabeth PA-C  hydrALAZINE  injection 10 mg, 10 mg, Intravenous, Q6H PRN, Helio Wolfe MD  HYDROcodone-acetaminophen  mg per tablet 1 tablet, 1 tablet, Oral, Q4H PRN, Helio Wolfe MD, 1 tablet at 03/07/19 1405  hydromorphone (PF) injection 2 mg, 2 mg, Intravenous, Q3H PRN, Helio Wolfe MD  HYDROmorphone injection 1 mg, 1 mg, Intravenous, Q3H PRN, Helio Wolfe MD, 1 mg at 03/06/19 0607  lidocaine (PF) 10 mg/ml (1%) injection 10 mg, 1 mL, Intradermal, Once, Homer Johnson,   methocarbamol tablet 750 mg, 750 mg, Oral, TID, Helio Wolfe MD, 750 mg at 03/07/19 1405  nozaseptin (NOZIN) nasal , , Each Nare, On Call Procedure, Kathy Elizabeth PA-C  promethazine (PHENERGAN) 6.25 mg in dextrose 5 % 50 mL IVPB, 6.25 mg, Intravenous, Q6H PRN, Helio Wolfe MD, Last Rate: 150 mL/hr at 03/06/19 0836, 6.25 mg at 03/06/19 0836  senna-docusate 8.6-50 mg per tablet 2 tablet, 2 tablet, Oral, Nightly PRN, Helio Wolfe MD  traMADol tablet 50 mg, 50 mg, Oral, Q6H PRN, Kathy Elizabeth PA-C, 50 mg at 03/06/19 1540        Nursing:   Wound Care Orders:  yes:  Place thin layer of bacitracin ointment to wound and cover with bandage     Diet:   regular diet    Activities:   activity as tolerated    Referrals/ Consults  Physical Therapy to evaluate and treat. Evaluate for home safety and equipment needs; Establish/upgrade home exercise program. Perform / instruct on therapeutic exercises, gait training, transfer training, and Range of Motion.    Home Health Aide:  Physical Therapy Three times weekly     Order Specific Question Answer Comments   What [facility] Health Agency is the patient [facility]      Lifting restrictions   Order Comments: No lifting greater than 10 lbs     Notify your health care provider if you experience any of the following:  temperature >100.4     Notify your health care provider if you experience any of the following:  severe persistent headache     Notify your  health care provider if you experience any of the following:  persistent dizziness, light-headedness, or visual disturbances     Notify your health care provider if you experience any of the following:  redness, tenderness, or signs of infection (pain, swelling, redness, odor or green/yellow discharge around incision site)     Notify your health care provider if you experience any of the following:  increased confusion or weakness     Change dressing (specify)   Order Comments: Dressing change: once a day.  Apply thin layer of bacitracin ointment and cover with a dressing and tape   OK to have bath without c collar  Keep  Incision clean and dry     Activity as tolerated

## 2019-03-10 NOTE — PLAN OF CARE
Problem: Adult Inpatient Plan of Care  Goal: Plan of Care Review  Outcome: Outcome(s) achieved Date Met: 03/09/19  Discharge information given to patient, explained and verbalized understanding.  [facility] to see patient.  Wound care instructions explained to patient and wife, verbalized understanding.  Medication picked up from [facility] pharmacy.  Cervical collar in place.  Pain controled. Patient remained free from injury.  IV removed.  Patient discharged to home with wife.

## 2019-03-11 ENCOUNTER — PATIENT MESSAGE (OUTPATIENT)
Dept: NEUROSURGERY | Facility: CLINIC | Age: 69
End: 2019-03-11

## 2019-03-11 ENCOUNTER — TELEPHONE (OUTPATIENT)
Dept: NEUROSURGERY | Facility: CLINIC | Age: 69
End: 2019-03-11

## 2019-03-11 ENCOUNTER — OFFICE VISIT (OUTPATIENT)
Dept: NEUROSURGERY | Facility: CLINIC | Age: 69
End: 2019-03-11
Payer: MEDICARE

## 2019-03-11 VITALS
DIASTOLIC BLOOD PRESSURE: 90 MMHG | SYSTOLIC BLOOD PRESSURE: 145 MMHG | BODY MASS INDEX: 20.53 KG/M2 | HEART RATE: 62 BPM | WEIGHT: 160 LBS | HEIGHT: 74 IN | RESPIRATION RATE: 18 BRPM

## 2019-03-11 DIAGNOSIS — M54.12 CERVICAL MYELOPATHY WITH CERVICAL RADICULOPATHY: Primary | ICD-10-CM

## 2019-03-11 DIAGNOSIS — M50.30 DEGENERATIVE DISC DISEASE, CERVICAL: Primary | ICD-10-CM

## 2019-03-11 DIAGNOSIS — G95.9 CERVICAL MYELOPATHY WITH CERVICAL RADICULOPATHY: Primary | ICD-10-CM

## 2019-03-11 PROCEDURE — 99024 POSTOP FOLLOW-UP VISIT: CPT | Mod: S$GLB,,, | Performed by: NEUROLOGICAL SURGERY

## 2019-03-11 PROCEDURE — 99024 PR POST-OP FOLLOW-UP VISIT: ICD-10-PCS | Mod: S$GLB,,, | Performed by: NEUROLOGICAL SURGERY

## 2019-03-11 PROCEDURE — 99999 PR PBB SHADOW E&M-EST. PATIENT-LVL III: ICD-10-PCS | Mod: PBBFAC,,, | Performed by: NEUROLOGICAL SURGERY

## 2019-03-11 PROCEDURE — 99999 PR PBB SHADOW E&M-EST. PATIENT-LVL III: CPT | Mod: PBBFAC,,, | Performed by: NEUROLOGICAL SURGERY

## 2019-03-11 RX ORDER — SULFAMETHOXAZOLE AND TRIMETHOPRIM 800; 160 MG/1; MG/1
1 TABLET ORAL 2 TIMES DAILY
Qty: 20 TABLET | Refills: 0 | Status: ON HOLD | OUTPATIENT
Start: 2019-03-11 | End: 2019-03-17 | Stop reason: HOSPADM

## 2019-03-11 RX ORDER — HYDROCODONE BITARTRATE AND ACETAMINOPHEN 10; 325 MG/1; MG/1
1 TABLET ORAL EVERY 4 HOURS PRN
Qty: 60 TABLET | Refills: 0 | Status: SHIPPED | OUTPATIENT
Start: 2019-03-11 | End: 2019-04-24

## 2019-03-11 NOTE — PROGRESS NOTES
Neurosurgery postop follow up note    Patient is here today for postop follow-up  He was recently discharged on Saturday March 9th following a posterior cervical fusion.  He had some headaches and dizziness postop day 1 which resolved with therapy.  MRI and CT scan were obtained which did not show any abnormalities.  Patient strength improved on the right side from his preoperative baseline strength.  His pain has been controlled.  And he was released from the hospital.  Because I had a clinical suspicion of possible spinal fluid leak I wanted to make sure that he was still was not having any drainage from his incision or any other headaches or any other issues.    He is here today pain is 2/10.  Right arm pain is much better than prior to the operation.  There has not been any drainage from the incision. Home health has been changing the dressing daily.  He has continues to take pain medications as needed    Afebrile  Vital signs stable    Patient ambulating in halls with rolling walker without difficulty.  Able to get onto the examination table without any difficulty.  Moves all 4 extremities well.   His incision is clean dry and intact  Clean with a chlorhexidine prep  Covered with tape and then a sterile dressing derma-bond was placed superficially.    AP  Patient is here today for a postop incision check  He is doing great otherwise and is not having any complaints today other than incision neck pain 2/10.  I still want to be very cautious with his incision. I would like to see him back in 2 days to again inspected with dressing.  I have given him ABD pads and he will change daily and call or return if noticed any swelling or drainage the incision headaches temperature greater than 100.4 or any other change in condition.

## 2019-03-11 NOTE — TELEPHONE ENCOUNTER
Spoke with pt in reference to surgery, pt stated he's not having any pain today but hasn't gotten out of bed, and would like to see Dr. Wolfe on today, pt will come in for 1pm//ns

## 2019-03-13 ENCOUNTER — PATIENT MESSAGE (OUTPATIENT)
Dept: SURGERY | Facility: HOSPITAL | Age: 69
End: 2019-03-13

## 2019-03-13 ENCOUNTER — OFFICE VISIT (OUTPATIENT)
Dept: NEUROSURGERY | Facility: CLINIC | Age: 69
End: 2019-03-13
Payer: MEDICARE

## 2019-03-13 ENCOUNTER — TELEPHONE (OUTPATIENT)
Dept: NEUROSURGERY | Facility: CLINIC | Age: 69
End: 2019-03-13

## 2019-03-13 ENCOUNTER — ANESTHESIA EVENT (OUTPATIENT)
Dept: SURGERY | Facility: HOSPITAL | Age: 69
DRG: 029 | End: 2019-03-13
Payer: MEDICARE

## 2019-03-13 VITALS
RESPIRATION RATE: 18 BRPM | BODY MASS INDEX: 20.54 KG/M2 | SYSTOLIC BLOOD PRESSURE: 150 MMHG | TEMPERATURE: 99 F | DIASTOLIC BLOOD PRESSURE: 87 MMHG | HEART RATE: 64 BPM | WEIGHT: 160.06 LBS | HEIGHT: 74 IN

## 2019-03-13 DIAGNOSIS — Z98.890 POSTOPERATIVE STATE: ICD-10-CM

## 2019-03-13 DIAGNOSIS — Z98.1 STATUS POST CERVICAL SPINAL FUSION: Primary | ICD-10-CM

## 2019-03-13 DIAGNOSIS — G97.82 POSTPROCEDURAL PSEUDOMENINGOCELE: ICD-10-CM

## 2019-03-13 PROCEDURE — 99024 POSTOP FOLLOW-UP VISIT: CPT | Mod: S$GLB,,, | Performed by: NEUROLOGICAL SURGERY

## 2019-03-13 PROCEDURE — 99024 PR POST-OP FOLLOW-UP VISIT: ICD-10-PCS | Mod: S$GLB,,, | Performed by: NEUROLOGICAL SURGERY

## 2019-03-13 PROCEDURE — 99999 PR PBB SHADOW E&M-EST. PATIENT-LVL V: CPT | Mod: PBBFAC,,, | Performed by: NEUROLOGICAL SURGERY

## 2019-03-13 PROCEDURE — 99999 PR PBB SHADOW E&M-EST. PATIENT-LVL V: ICD-10-PCS | Mod: PBBFAC,,, | Performed by: NEUROLOGICAL SURGERY

## 2019-03-13 RX ORDER — CEPHALEXIN 500 MG/1
500 CAPSULE ORAL EVERY 8 HOURS
Qty: 21 CAPSULE | Refills: 0 | Status: SHIPPED | OUTPATIENT
Start: 2019-03-13 | End: 2019-03-20

## 2019-03-13 NOTE — PROGRESS NOTES
Subjective:      Patient ID: Brett Nugent is a 68 y.o. male.    Chief Complaint: No chief complaint on file.   Patient is back again in clinic for wound check  He states that he has been having some mild drainage the crease of the bottom of his neck  Serous sanguinous.  There is also some redness to the bottom of his incision.  Otherwise pain as remains stable he rates his neck pain is 3/10 in severity today.  As before he continues to have intermittent weakness on the right side.  No fever.  He was placed on Bactrim 2 days ago.  Denies any headaches, nausea, vomiting, dizziness      Review of Systems   Constitutional: Negative for fatigue and unexpected weight change.   HENT: Negative for ear pain, hearing loss, tinnitus and voice change.    Respiratory: Negative for shortness of breath.    Cardiovascular: Negative for chest pain.   Gastrointestinal: Negative for abdominal pain.   Musculoskeletal: Negative for back pain, gait problem, myalgias, neck pain and neck stiffness.   Skin: Negative for color change.   Neurological: Negative for dizziness, tremors, numbness and headaches.   Psychiatric/Behavioral: Negative for agitation and confusion. The patient is not nervous/anxious.          Objective:   Patient is ambulating without difficulty today  C-collar is in place  Dressing was removed  Overall appears intact however the crease of his neck there is a small amount of clear drainage which was evident.  The bottom of his incision has noted erythema.  Moving all extremities well  Sensations intact to light touch   Reflexes are +2  Assessment:     1. Status post cervical spinal fusion    2. Postoperative state      Plan:     Status post cervical spinal fusion    Postoperative state     At this point patient has some drainage to the crease of his neck.  There is erythema noted to the bottom of the incision. I would like to take the patient back for a posterior wound exploration with closure of CSF leak.  He  understands the risk and benefits.  I explained that may take at the hardware versus extend the laminectomy as needed in the surgery.  He consents to procedure will plan for surgery on Thursday March 14th.  Thank you for the referral   Please call with any questions    Helio Wolfe MD  Neurosurgery

## 2019-03-13 NOTE — TELEPHONE ENCOUNTER
Spoke with pt in reference to surgery on tomorrow, report time is 0630 instead of 0530, and Kal is aware of the time for tomorrow//ns

## 2019-03-13 NOTE — PRE-PROCEDURE INSTRUCTIONS
Pre op instructions reviewed with patient per phone:    To confirm, Your surgeon has instructed you:  Surgery is scheduled 3/14/19 at 0700.      Please report to [facility] 1st floor main lobby by 0530.   Pre admit office to call afternoon prior to surgery with final arrival time      INSTRUCTIONS IMPORTANT!!!  ¨ Do not eat, drink, or smoke after 12 midnight-including water. OK to brush teeth, no gum, candy or mints!    ¨ Take only these medicines with a small swallow of water-morning of surgery.  Nexium, Simvastatin    ____  Do not wear makeup, including mascara.  ____  No powder, lotions or creams to surgical area.  ____  Please remove all jewelry, including piercings and leave at home.  ____  No money or valuables needed. Please leave at home.  ____  Please bring identification and insurance information to hospital.  ____  If going home the same day, arrange for a ride home. You will not be able to   drive if Anesthesia was used.  ____  Children, under 12 years old, must remain in the waiting room with an adult.  They are not allowed in patient areas.  ____  Wear loose fitting clothing. Allow for dressings, bandages.  ____  Stop Aspirin, Ibuprofen, Motrin and Aleve at least 5-7 days before surgery, unless otherwise instructed by your doctor, or the nurse.   You MAY use Tylenol/acetaminophen until day of surgery.  ____  If you take diabetic medication, do not take am of surgery unless instructed by   Doctor.  ____ Stop taking any Fish Oil supplement or any Vitamins that contain Vitamin E at least 5 days prior to surgery.          Bathing Instructions-- The night before surgery and the morning prior to coming to the hospital:   -Do not shave the surgical area.   -Shower and wash your hair and body as usual with anti-bacterial  soap and shampoo.   -Rinse your hair and body completely.   -Use one packet of hibiclens to wash the surgical site (using your hand) gently for 5 minutes.  Do not  scrub you skin too hard.   -Do not use hibiclens on your head, face, or genitals.   -Do not wash with anti-bacterial soap after you use the hibiclens.   -Rinse your body thoroughly.   -Dry with clean, soft towel.  Do not use lotion, cream, deodorant, or powders on   the surgical site.    Use antibacterial soap in place of hibiclens if your surgery is on the head, face or genitals.         Surgical Site Infection    Prevention of surgical site infections:     -Keep incisions clean and dry.   -Do not soak/submerge incisions in water until completely healed.   -Do not apply lotions, powders, creams, or deodorants to site.   -Always make sure hands are cleaned with antibacterial soap/ alcohol-based   prior to touching the surgical site.  (This includes doctors, nurses, staff, and yourself.)    Signs and symptoms:   -Redness and pain around the area where you had surgery   -Drainage of cloudy fluid from your surgical wound   -Fever over 100.4  I have read or had read and explained to me, and understand the above information.

## 2019-03-14 ENCOUNTER — HOSPITAL ENCOUNTER (INPATIENT)
Facility: HOSPITAL | Age: 69
LOS: 3 days | Discharge: HOME HEALTH | DRG: 029 | End: 2019-03-17
Attending: NEUROLOGICAL SURGERY | Admitting: NEUROLOGICAL SURGERY
Payer: MEDICARE

## 2019-03-14 ENCOUNTER — ANESTHESIA (OUTPATIENT)
Dept: SURGERY | Facility: HOSPITAL | Age: 69
DRG: 029 | End: 2019-03-14
Payer: MEDICARE

## 2019-03-14 DIAGNOSIS — R03.0 WHITE COAT SYNDROME WITHOUT DIAGNOSIS OF HYPERTENSION: ICD-10-CM

## 2019-03-14 DIAGNOSIS — G96.00 POSTOPERATIVE CSF LEAK: Primary | ICD-10-CM

## 2019-03-14 DIAGNOSIS — R53.1 RIGHT SIDED WEAKNESS: ICD-10-CM

## 2019-03-14 DIAGNOSIS — G97.82 POSTOPERATIVE CSF LEAK: Primary | ICD-10-CM

## 2019-03-14 DIAGNOSIS — N40.0 ENLARGED PROSTATE: ICD-10-CM

## 2019-03-14 DIAGNOSIS — G99.2 STENOSIS OF CERVICAL SPINE WITH MYELOPATHY: ICD-10-CM

## 2019-03-14 DIAGNOSIS — H55.00 NYSTAGMUS: ICD-10-CM

## 2019-03-14 DIAGNOSIS — M50.30 DEGENERATIVE DISC DISEASE, CERVICAL: ICD-10-CM

## 2019-03-14 DIAGNOSIS — M81.0 AGE-RELATED OSTEOPOROSIS WITHOUT CURRENT PATHOLOGICAL FRACTURE: ICD-10-CM

## 2019-03-14 DIAGNOSIS — F41.9 ANXIETY: ICD-10-CM

## 2019-03-14 DIAGNOSIS — E78.00 PURE HYPERCHOLESTEROLEMIA: ICD-10-CM

## 2019-03-14 DIAGNOSIS — M48.02 STENOSIS OF CERVICAL SPINE WITH MYELOPATHY: ICD-10-CM

## 2019-03-14 DIAGNOSIS — M51.36 DDD (DEGENERATIVE DISC DISEASE), LUMBAR: ICD-10-CM

## 2019-03-14 DIAGNOSIS — K21.9 GASTROESOPHAGEAL REFLUX DISEASE WITHOUT ESOPHAGITIS: ICD-10-CM

## 2019-03-14 LAB
ANION GAP SERPL CALC-SCNC: 10 MMOL/L
BASOPHILS # BLD AUTO: 0.01 K/UL
BASOPHILS NFR BLD: 0.1 %
BUN SERPL-MCNC: 17 MG/DL
CALCIUM SERPL-MCNC: 8.5 MG/DL
CHLORIDE SERPL-SCNC: 107 MMOL/L
CO2 SERPL-SCNC: 22 MMOL/L
CREAT SERPL-MCNC: 0.9 MG/DL
DIFFERENTIAL METHOD: ABNORMAL
EOSINOPHIL # BLD AUTO: 0.1 K/UL
EOSINOPHIL NFR BLD: 0.9 %
ERYTHROCYTE [DISTWIDTH] IN BLOOD BY AUTOMATED COUNT: 14.3 %
EST. GFR  (AFRICAN AMERICAN): >60 ML/MIN/1.73 M^2
EST. GFR  (NON AFRICAN AMERICAN): >60 ML/MIN/1.73 M^2
GLUCOSE SERPL-MCNC: 146 MG/DL
HCT VFR BLD AUTO: 40.7 %
HGB BLD-MCNC: 13.4 G/DL
LYMPHOCYTES # BLD AUTO: 1 K/UL
LYMPHOCYTES NFR BLD: 9.5 %
MCH RBC QN AUTO: 29.5 PG
MCHC RBC AUTO-ENTMCNC: 32.9 G/DL
MCV RBC AUTO: 90 FL
MONOCYTES # BLD AUTO: 0.1 K/UL
MONOCYTES NFR BLD: 1.3 %
NEUTROPHILS # BLD AUTO: 9.4 K/UL
NEUTROPHILS NFR BLD: 88.2 %
PLATELET # BLD AUTO: 210 K/UL
PMV BLD AUTO: 9 FL
POTASSIUM SERPL-SCNC: 4.3 MMOL/L
RBC # BLD AUTO: 4.54 M/UL
SODIUM SERPL-SCNC: 139 MMOL/L
WBC # BLD AUTO: 10.63 K/UL

## 2019-03-14 PROCEDURE — 63600175 PHARM REV CODE 636 W HCPCS: Performed by: ANESTHESIOLOGY

## 2019-03-14 PROCEDURE — 63600175 PHARM REV CODE 636 W HCPCS: Performed by: PHYSICIAN ASSISTANT

## 2019-03-14 PROCEDURE — 25000003 PHARM REV CODE 250: Performed by: NEUROLOGICAL SURGERY

## 2019-03-14 PROCEDURE — 71000033 HC RECOVERY, INTIAL HOUR: Performed by: NEUROLOGICAL SURGERY

## 2019-03-14 PROCEDURE — 63707 PR REPR,DURAL/CSF LEAK,NOT REQ LAMINECTOMY: ICD-10-PCS | Mod: 78,AS,, | Performed by: PHYSICIAN ASSISTANT

## 2019-03-14 PROCEDURE — 99900035 HC TECH TIME PER 15 MIN (STAT)

## 2019-03-14 PROCEDURE — 63600175 PHARM REV CODE 636 W HCPCS: Performed by: NEUROLOGICAL SURGERY

## 2019-03-14 PROCEDURE — 80048 BASIC METABOLIC PNL TOTAL CA: CPT

## 2019-03-14 PROCEDURE — 36000711: Performed by: NEUROLOGICAL SURGERY

## 2019-03-14 PROCEDURE — 27800903 OPTIME MED/SURG SUP & DEVICES OTHER IMPLANTS: Performed by: NEUROLOGICAL SURGERY

## 2019-03-14 PROCEDURE — S0020 INJECTION, BUPIVICAINE HYDRO: HCPCS | Performed by: NEUROLOGICAL SURGERY

## 2019-03-14 PROCEDURE — 37000008 HC ANESTHESIA 1ST 15 MINUTES: Performed by: NEUROLOGICAL SURGERY

## 2019-03-14 PROCEDURE — C9290 INJ, BUPIVACAINE LIPOSOME: HCPCS | Performed by: PHYSICIAN ASSISTANT

## 2019-03-14 PROCEDURE — 63707 REPAIR SPINAL FLUID LEAKAGE: CPT | Mod: 78,AS,, | Performed by: PHYSICIAN ASSISTANT

## 2019-03-14 PROCEDURE — 63600175 PHARM REV CODE 636 W HCPCS: Performed by: NURSE ANESTHETIST, CERTIFIED REGISTERED

## 2019-03-14 PROCEDURE — 25000003 PHARM REV CODE 250: Performed by: PHYSICIAN ASSISTANT

## 2019-03-14 PROCEDURE — 37000009 HC ANESTHESIA EA ADD 15 MINS: Performed by: NEUROLOGICAL SURGERY

## 2019-03-14 PROCEDURE — 85025 COMPLETE CBC W/AUTO DIFF WBC: CPT

## 2019-03-14 PROCEDURE — 25000003 PHARM REV CODE 250: Performed by: NURSE ANESTHETIST, CERTIFIED REGISTERED

## 2019-03-14 PROCEDURE — 11000001 HC ACUTE MED/SURG PRIVATE ROOM

## 2019-03-14 PROCEDURE — 27201423 OPTIME MED/SURG SUP & DEVICES STERILE SUPPLY: Performed by: NEUROLOGICAL SURGERY

## 2019-03-14 PROCEDURE — 25000003 PHARM REV CODE 250: Performed by: ANESTHESIOLOGY

## 2019-03-14 PROCEDURE — 63707 REPAIR SPINAL FLUID LEAKAGE: CPT | Mod: 78,,, | Performed by: NEUROLOGICAL SURGERY

## 2019-03-14 PROCEDURE — 63707 PR REPR,DURAL/CSF LEAK,NOT REQ LAMINECTOMY: ICD-10-PCS | Mod: 78,,, | Performed by: NEUROLOGICAL SURGERY

## 2019-03-14 PROCEDURE — C1729 CATH, DRAINAGE: HCPCS | Performed by: NEUROLOGICAL SURGERY

## 2019-03-14 PROCEDURE — 36000710: Performed by: NEUROLOGICAL SURGERY

## 2019-03-14 RX ORDER — AMOXICILLIN 250 MG
2 CAPSULE ORAL NIGHTLY PRN
Status: DISCONTINUED | OUTPATIENT
Start: 2019-03-14 | End: 2019-03-17 | Stop reason: HOSPADM

## 2019-03-14 RX ORDER — FENTANYL CITRATE 50 UG/ML
INJECTION, SOLUTION INTRAMUSCULAR; INTRAVENOUS
Status: DISCONTINUED | OUTPATIENT
Start: 2019-03-14 | End: 2019-03-14

## 2019-03-14 RX ORDER — FENTANYL CITRATE 50 UG/ML
25 INJECTION, SOLUTION INTRAMUSCULAR; INTRAVENOUS EVERY 5 MIN PRN
Status: COMPLETED | OUTPATIENT
Start: 2019-03-14 | End: 2019-03-14

## 2019-03-14 RX ORDER — HYDROMORPHONE HYDROCHLORIDE 2 MG/ML
2 INJECTION, SOLUTION INTRAMUSCULAR; INTRAVENOUS; SUBCUTANEOUS
Status: DISCONTINUED | OUTPATIENT
Start: 2019-03-14 | End: 2019-03-17 | Stop reason: HOSPADM

## 2019-03-14 RX ORDER — MEPERIDINE HYDROCHLORIDE 50 MG/ML
12.5 INJECTION INTRAMUSCULAR; INTRAVENOUS; SUBCUTANEOUS EVERY 10 MIN PRN
Status: DISCONTINUED | OUTPATIENT
Start: 2019-03-14 | End: 2019-03-14 | Stop reason: HOSPADM

## 2019-03-14 RX ORDER — GLYCOPYRROLATE 0.2 MG/ML
INJECTION INTRAMUSCULAR; INTRAVENOUS
Status: DISCONTINUED | OUTPATIENT
Start: 2019-03-14 | End: 2019-03-14

## 2019-03-14 RX ORDER — PROPOFOL 10 MG/ML
VIAL (ML) INTRAVENOUS
Status: DISCONTINUED | OUTPATIENT
Start: 2019-03-14 | End: 2019-03-14

## 2019-03-14 RX ORDER — MORPHINE SULFATE 2 MG/ML
2 INJECTION, SOLUTION INTRAMUSCULAR; INTRAVENOUS
Status: DISCONTINUED | OUTPATIENT
Start: 2019-03-14 | End: 2019-03-17 | Stop reason: HOSPADM

## 2019-03-14 RX ORDER — LIDOCAINE HCL/PF 100 MG/5ML
SYRINGE (ML) INTRAVENOUS
Status: DISCONTINUED | OUTPATIENT
Start: 2019-03-14 | End: 2019-03-14

## 2019-03-14 RX ORDER — SODIUM CHLORIDE, SODIUM LACTATE, POTASSIUM CHLORIDE, CALCIUM CHLORIDE 600; 310; 30; 20 MG/100ML; MG/100ML; MG/100ML; MG/100ML
INJECTION, SOLUTION INTRAVENOUS CONTINUOUS
Status: DISCONTINUED | OUTPATIENT
Start: 2019-03-14 | End: 2019-03-14

## 2019-03-14 RX ORDER — VANCOMYCIN HCL IN 5 % DEXTROSE 1G/250ML
1000 PLASTIC BAG, INJECTION (ML) INTRAVENOUS
Status: COMPLETED | OUTPATIENT
Start: 2019-03-14 | End: 2019-03-14

## 2019-03-14 RX ORDER — BISACODYL 5 MG
10 TABLET, DELAYED RELEASE (ENTERIC COATED) ORAL 2 TIMES DAILY
Status: DISCONTINUED | OUTPATIENT
Start: 2019-03-14 | End: 2019-03-17 | Stop reason: HOSPADM

## 2019-03-14 RX ORDER — LIDOCAINE HYDROCHLORIDE 10 MG/ML
1 INJECTION, SOLUTION EPIDURAL; INFILTRATION; INTRACAUDAL; PERINEURAL ONCE
Status: DISCONTINUED | OUTPATIENT
Start: 2019-03-14 | End: 2019-03-14

## 2019-03-14 RX ORDER — CHLORHEXIDINE GLUCONATE ORAL RINSE 1.2 MG/ML
10 SOLUTION DENTAL
Status: DISCONTINUED | OUTPATIENT
Start: 2019-03-14 | End: 2019-03-14 | Stop reason: HOSPADM

## 2019-03-14 RX ORDER — ONDANSETRON 2 MG/ML
INJECTION INTRAMUSCULAR; INTRAVENOUS
Status: DISCONTINUED | OUTPATIENT
Start: 2019-03-14 | End: 2019-03-14

## 2019-03-14 RX ORDER — ALPRAZOLAM 0.25 MG/1
0.25 TABLET ORAL 3 TIMES DAILY PRN
Status: DISCONTINUED | OUTPATIENT
Start: 2019-03-14 | End: 2019-03-17 | Stop reason: HOSPADM

## 2019-03-14 RX ORDER — HYDROCODONE BITARTRATE AND ACETAMINOPHEN 10; 325 MG/1; MG/1
1 TABLET ORAL EVERY 4 HOURS PRN
Status: DISCONTINUED | OUTPATIENT
Start: 2019-03-14 | End: 2019-03-17 | Stop reason: HOSPADM

## 2019-03-14 RX ORDER — SODIUM CHLORIDE 9 MG/ML
INJECTION, SOLUTION INTRAVENOUS CONTINUOUS
Status: DISCONTINUED | OUTPATIENT
Start: 2019-03-14 | End: 2019-03-14

## 2019-03-14 RX ORDER — PANTOPRAZOLE SODIUM 40 MG/1
40 TABLET, DELAYED RELEASE ORAL DAILY
Status: DISCONTINUED | OUTPATIENT
Start: 2019-03-14 | End: 2019-03-17 | Stop reason: HOSPADM

## 2019-03-14 RX ORDER — ONDANSETRON 2 MG/ML
4 INJECTION INTRAMUSCULAR; INTRAVENOUS DAILY PRN
Status: DISCONTINUED | OUTPATIENT
Start: 2019-03-14 | End: 2019-03-14 | Stop reason: HOSPADM

## 2019-03-14 RX ORDER — CHLORHEXIDINE GLUCONATE ORAL RINSE 1.2 MG/ML
10 SOLUTION DENTAL
Status: DISCONTINUED | OUTPATIENT
Start: 2019-03-14 | End: 2019-03-14

## 2019-03-14 RX ORDER — NEOSTIGMINE METHYLSULFATE 1 MG/ML
INJECTION, SOLUTION INTRAVENOUS
Status: DISCONTINUED | OUTPATIENT
Start: 2019-03-14 | End: 2019-03-14

## 2019-03-14 RX ORDER — METHOCARBAMOL 750 MG/1
750 TABLET, FILM COATED ORAL 3 TIMES DAILY
Status: DISCONTINUED | OUTPATIENT
Start: 2019-03-14 | End: 2019-03-14 | Stop reason: SDUPTHER

## 2019-03-14 RX ORDER — VANCOMYCIN HYDROCHLORIDE 1 G/20ML
INJECTION, POWDER, LYOPHILIZED, FOR SOLUTION INTRAVENOUS
Status: DISCONTINUED | OUTPATIENT
Start: 2019-03-14 | End: 2019-03-14 | Stop reason: HOSPADM

## 2019-03-14 RX ORDER — ACETAMINOPHEN 325 MG/1
650 TABLET ORAL EVERY 6 HOURS PRN
Status: DISCONTINUED | OUTPATIENT
Start: 2019-03-14 | End: 2019-03-17 | Stop reason: HOSPADM

## 2019-03-14 RX ORDER — METHOCARBAMOL 750 MG/1
750 TABLET, FILM COATED ORAL 3 TIMES DAILY
Status: DISCONTINUED | OUTPATIENT
Start: 2019-03-14 | End: 2019-03-15

## 2019-03-14 RX ORDER — BUPIVACAINE HYDROCHLORIDE 5 MG/ML
INJECTION, SOLUTION EPIDURAL; INTRACAUDAL
Status: DISCONTINUED | OUTPATIENT
Start: 2019-03-14 | End: 2019-03-14 | Stop reason: HOSPADM

## 2019-03-14 RX ORDER — ONDANSETRON 8 MG/1
8 TABLET, ORALLY DISINTEGRATING ORAL EVERY 6 HOURS PRN
Status: DISCONTINUED | OUTPATIENT
Start: 2019-03-14 | End: 2019-03-17 | Stop reason: HOSPADM

## 2019-03-14 RX ORDER — ROCURONIUM BROMIDE 10 MG/ML
INJECTION, SOLUTION INTRAVENOUS
Status: DISCONTINUED | OUTPATIENT
Start: 2019-03-14 | End: 2019-03-14

## 2019-03-14 RX ORDER — HYDROMORPHONE HYDROCHLORIDE 2 MG/ML
0.2 INJECTION, SOLUTION INTRAMUSCULAR; INTRAVENOUS; SUBCUTANEOUS EVERY 5 MIN PRN
Status: DISCONTINUED | OUTPATIENT
Start: 2019-03-14 | End: 2019-03-14 | Stop reason: HOSPADM

## 2019-03-14 RX ORDER — DIAZEPAM 5 MG/1
5 TABLET ORAL EVERY 6 HOURS PRN
Status: DISCONTINUED | OUTPATIENT
Start: 2019-03-14 | End: 2019-03-15

## 2019-03-14 RX ORDER — DEXAMETHASONE SODIUM PHOSPHATE 4 MG/ML
INJECTION, SOLUTION INTRA-ARTICULAR; INTRALESIONAL; INTRAMUSCULAR; INTRAVENOUS; SOFT TISSUE
Status: DISCONTINUED | OUTPATIENT
Start: 2019-03-14 | End: 2019-03-14

## 2019-03-14 RX ORDER — CALCIUM CARBONATE 500(1250)
500 TABLET ORAL DAILY
Status: DISCONTINUED | OUTPATIENT
Start: 2019-03-14 | End: 2019-03-17 | Stop reason: HOSPADM

## 2019-03-14 RX ORDER — SUCCINYLCHOLINE CHLORIDE 20 MG/ML
INJECTION INTRAMUSCULAR; INTRAVENOUS
Status: DISCONTINUED | OUTPATIENT
Start: 2019-03-14 | End: 2019-03-14

## 2019-03-14 RX ORDER — OXYCODONE HYDROCHLORIDE 5 MG/1
5 TABLET ORAL
Status: DISCONTINUED | OUTPATIENT
Start: 2019-03-14 | End: 2019-03-14 | Stop reason: HOSPADM

## 2019-03-14 RX ORDER — OXYCODONE AND ACETAMINOPHEN 5; 325 MG/1; MG/1
1 TABLET ORAL EVERY 4 HOURS PRN
Status: DISCONTINUED | OUTPATIENT
Start: 2019-03-14 | End: 2019-03-17 | Stop reason: HOSPADM

## 2019-03-14 RX ORDER — VANCOMYCIN HCL IN 5 % DEXTROSE 1G/250ML
1000 PLASTIC BAG, INJECTION (ML) INTRAVENOUS
Status: DISCONTINUED | OUTPATIENT
Start: 2019-03-14 | End: 2019-03-14 | Stop reason: ALTCHOICE

## 2019-03-14 RX ORDER — BACITRACIN ZINC 500 UNIT/G
OINTMENT (GRAM) TOPICAL
Status: DISCONTINUED | OUTPATIENT
Start: 2019-03-14 | End: 2019-03-14 | Stop reason: HOSPADM

## 2019-03-14 RX ADMIN — ONDANSETRON 4 MG: 2 INJECTION, SOLUTION INTRAMUSCULAR; INTRAVENOUS at 12:03

## 2019-03-14 RX ADMIN — FENTANYL CITRATE 100 MCG: 50 INJECTION, SOLUTION INTRAMUSCULAR; INTRAVENOUS at 08:03

## 2019-03-14 RX ADMIN — VANCOMYCIN HYDROCHLORIDE 1000 MG: 1 INJECTION, POWDER, FOR SOLUTION INTRAVENOUS at 06:03

## 2019-03-14 RX ADMIN — BISACODYL 10 MG: 5 TABLET, COATED ORAL at 08:03

## 2019-03-14 RX ADMIN — SODIUM CHLORIDE, SODIUM LACTATE, POTASSIUM CHLORIDE, AND CALCIUM CHLORIDE: 600; 310; 30; 20 INJECTION, SOLUTION INTRAVENOUS at 11:03

## 2019-03-14 RX ADMIN — ROBINUL 0.2 MG: 0.2 INJECTION INTRAMUSCULAR; INTRAVENOUS at 10:03

## 2019-03-14 RX ADMIN — FENTANYL CITRATE 25 MCG: 50 INJECTION INTRAMUSCULAR; INTRAVENOUS at 12:03

## 2019-03-14 RX ADMIN — METHOCARBAMOL TABLETS 750 MG: 750 TABLET, COATED ORAL at 09:03

## 2019-03-14 RX ADMIN — SUCCINYLCHOLINE CHLORIDE 140 MG: 20 INJECTION, SOLUTION INTRAMUSCULAR; INTRAVENOUS at 08:03

## 2019-03-14 RX ADMIN — OXYCODONE HYDROCHLORIDE 5 MG: 5 TABLET ORAL at 01:03

## 2019-03-14 RX ADMIN — CALCIUM 500 MG: 500 TABLET ORAL at 09:03

## 2019-03-14 RX ADMIN — ALPRAZOLAM 0.25 MG: 0.25 TABLET ORAL at 11:03

## 2019-03-14 RX ADMIN — SODIUM CHLORIDE, SODIUM LACTATE, POTASSIUM CHLORIDE, AND CALCIUM CHLORIDE: 600; 310; 30; 20 INJECTION, SOLUTION INTRAVENOUS at 08:03

## 2019-03-14 RX ADMIN — CHLORHEXIDINE GLUCONATE 10 ML: 1.2 RINSE ORAL at 07:03

## 2019-03-14 RX ADMIN — LIDOCAINE HYDROCHLORIDE 100 MG: 20 INJECTION, SOLUTION INTRAVENOUS at 08:03

## 2019-03-14 RX ADMIN — BUPIVACAINE 133 MG: 13.3 INJECTION, SUSPENSION, LIPOSOMAL INFILTRATION at 11:03

## 2019-03-14 RX ADMIN — ROCURONIUM BROMIDE 45 MG: 10 INJECTION, SOLUTION INTRAVENOUS at 08:03

## 2019-03-14 RX ADMIN — ROCURONIUM BROMIDE 5 MG: 10 INJECTION, SOLUTION INTRAVENOUS at 08:03

## 2019-03-14 RX ADMIN — NEOSTIGMINE METHYLSULFATE 5 MG: 1 INJECTION INTRAVENOUS at 12:03

## 2019-03-14 RX ADMIN — ALPRAZOLAM 0.25 MG: 0.25 TABLET ORAL at 06:03

## 2019-03-14 RX ADMIN — MEPERIDINE HYDROCHLORIDE 12.5 MG: 50 INJECTION, SOLUTION INTRAMUSCULAR; INTRAVENOUS; SUBCUTANEOUS at 12:03

## 2019-03-14 RX ADMIN — PROPOFOL 150 MG: 10 INJECTION, EMULSION INTRAVENOUS at 08:03

## 2019-03-14 RX ADMIN — OXYCODONE AND ACETAMINOPHEN 1 TABLET: 5; 325 TABLET ORAL at 11:03

## 2019-03-14 RX ADMIN — DEXAMETHASONE SODIUM PHOSPHATE 8 MG: 4 INJECTION, SOLUTION INTRA-ARTICULAR; INTRALESIONAL; INTRAMUSCULAR; INTRAVENOUS; SOFT TISSUE at 09:03

## 2019-03-14 RX ADMIN — ROBINUL 0.8 MG: 0.2 INJECTION INTRAMUSCULAR; INTRAVENOUS at 12:03

## 2019-03-14 RX ADMIN — VANCOMYCIN HYDROCHLORIDE 1000 MG: 1 INJECTION, POWDER, FOR SOLUTION INTRAVENOUS at 07:03

## 2019-03-14 NOTE — ANESTHESIA PREPROCEDURE EVALUATION
03/14/2019  Brett Nugent is a 68 y.o., male.    Anesthesia Evaluation    I have reviewed the Patient Summary Reports.    I have reviewed the Nursing Notes.   I have reviewed the Medications.     Review of Systems  Anesthesia Hx:  No problems with previous Anesthesia  Denies Family Hx of Anesthesia complications.   Denies Personal Hx of Anesthesia complications.   Social:  No Alcohol Use, Non-Smoker    Hematology/Oncology:     Oncology Normal    -- Anemia:   EENT/Dental:   [place] IRA   Cardiovascular:   Denies Hypertension.  Denies MI.   Denies CABG/stent.      hyperlipidemia ECG has been reviewed. ekg 2/2019:  Normal sinus rhythm 61  Normal ECG  When compared with ECG of 02-JUL-2007 12:12,  Questionable change in The axis    Echo 2/2019:  1 - Concentric hypertrophy.     2 - No wall motion abnormalities.     3 - Normal left ventricular systolic function (EF 60-65%).     4 - Normal left ventricular diastolic function.     5 - Normal right ventricular systolic function .     6 - The estimated PA systolic pressure is 32 mmHg.    Pulmonary:   Denies COPD.  Denies Asthma.  Denies Sleep Apnea.    Renal/:  Renal/ Normal     Hepatic/GI:   GERD Denies Liver Disease. Denies Hepatitis.    Musculoskeletal:   Osteoporosis  FUSION OF POSTERIOR COLUMN OF CERVICAL SPINE USING COMPUTER AIDED NAVIGATION   Neurological:   Denies CVA. Denies Seizures. Right sided weakness   Endocrine:  Endocrine Normal        Physical Exam  General:  Well nourished    Airway/Jaw/Neck:  Airway Findings: Mouth Opening: Normal Tongue: Normal  General Airway Assessment: Adult  Mallampati: II      Dental:  Dental Findings: In tact   Chest/Lungs:  Chest/Lungs Findings: Clear to auscultation, Normal Respiratory Rate     Heart/Vascular:  Heart Findings: Rate: Normal  Rhythm: Regular Rhythm  Sounds: Normal             Anesthesia Plan  Type of Anesthesia,  risks & benefits discussed:  Anesthesia Type:  general  Patient's Preference:   Intra-op Monitoring Plan: standard ASA monitors  Intra-op Monitoring Plan Comments:   Post Op Pain Control Plan: multimodal analgesia  Post Op Pain Control Plan Comments:   Induction:   IV  Beta Blocker:  Patient is not currently on a Beta-Blocker (No further documentation required).       Informed Consent: Patient understands risks and agrees with Anesthesia plan.  Questions answered. Anesthesia consent signed with patient.  ASA Score: 2     Day of Surgery Review of History & Physical: I have interviewed and examined the patient. I have reviewed the patient's H&P dated:  There are no significant changes.  H&P update referred to the surgeon.         Ready For Surgery From Anesthesia Perspective.

## 2019-03-14 NOTE — PLAN OF CARE
03/14/19 1504   Readmission Questionnaire   At the time of your discharge, did someone talk to you about what your health problems were? Yes   At the time of discharge, did someone talk to you about what to watch out for regarding worsening of your health problem? Yes   What do you believe caused you to be sick enough to be re-admitted? second surgery related to his first cervical surgery   How often do you need to have someone help you when you read instructions, pamphlets, or other written material from your doctor or pharmacy? Often   Do you have problems taking your medications as prescribed? Yes   Do you have any problems affording any of  your prescribed medications? No   Do you have problems obtaining/receiving your medications? No   Does the patient have transportation to healthcare appointments? Yes   Lives With spouse   Living Arrangements house   Does the patient have family/friends to help with healtcare needs after discharge? yes   Who are your caregiver(s) and their phone number(s)? Laura Iversonsoco ( spouse ) [phone]   Does your caregiver provide all the help you need? Yes   Are you currently feeling confused? No

## 2019-03-14 NOTE — PLAN OF CARE
Met with patient and family. Patient is independent with adls and iadls with equipment. Patient's wife stated that this is their 3rd  admission this year. He was last discharged over the weekend. Patient lives with his wife Laura and she indicated that she will provide transportation upon discharge. Patient is current with [facility] and wishes to resume with their services. Preference letter obtained for [facility]. Referral placed in [facility] for [facility]. Notified Wilma Peters Clinical Liaison with [facility] of patient's admission. Updated white board with 's name and number. Transitional Care Folder, Discharge Planning Begins on Admission pamphlet, [facility] Bedside Delivery pamphlet, Advance Directive information given to patient along with the contact information given.Instructed patient or family to call with any questions or concerns.        [facility] - [place], [place] - [address] [address]  Phone: [phone] Fax: [phone]    [facility] [address] [place] [address] [place] - [address] AT [facility]  [address]  [place] [place] [facility] [address]  Phone: [phone] Fax: [phone]    Leonardo Khan MD  Payor: [facility] MCARE / Plan: [facility] GROUP MEDICARE ADVANTAGE / Product Type: Medicare Advantage /           03/14/19 1500   Discharge Assessment   Assessment Type Discharge Planning Assessment   Confirmed/corrected address and phone number on facesheet? Yes   Assessment information obtained from? Patient;Caregiver;Medical Record   Expected Length of Stay (days) (tbd)   Communicated expected length of stay with patient/caregiver yes   Prior to hospitilization cognitive status: Alert/Oriented   Prior to hospitalization functional status: Independent;Assistive Equipment   Current cognitive status: Alert/Oriented   Current Functional Status: Partially Dependent    Facility Arrived From: home   Lives With spouse   Able to Return to Prior Arrangements yes   Is patient able to care for self after discharge? Yes   Who are your caregiver(s) and their phone number(s)? Laura Nugent ( spouse ) [phone]   Patient's perception of discharge disposition home health   Readmission Within the Last 30 Days previous discharge plan unsuccessful   If yes, most recent facility name: [facility]   Patient currently being followed by outpatient case management? No   Patient currently receives any other outside agency services? No   Equipment Currently Used at Home bedside commode;cane, straight;grab bar;walker, rolling;wheelchair;other (see comments)  (has equipment not all currently in use.)   Do you have any problems affording any of your prescribed medications? No   Is the patient taking medications as prescribed? yes   Does the patient have transportation home? Yes   Transportation Anticipated family or friend will provide   Does the patient receive services at the [facility]? No   Discharge Plan A Home;Home Health;Home with family   Discharge Plan B Home;Home with family;Home Health   DME Needed Upon Discharge  none   Patient/Family in Agreement with Plan yes

## 2019-03-14 NOTE — BRIEF OP NOTE
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

## 2019-03-14 NOTE — PLAN OF CARE
03/14/19 1517   Post-Acute Status   Post-Acute Authorization Home Health/Hospice   Home Health/Hospice Status Referrals Sent

## 2019-03-14 NOTE — PLAN OF CARE
03/14/19 1500   Medicare Message   Important Message from Medicare regarding Discharge Appeal Rights Given to patient/caregiver;Signed/date by patient/caregiver;Explained to patient/caregiver   Date IMM was signed 03/14/19   Time IMM was signed 1500

## 2019-03-14 NOTE — INTERVAL H&P NOTE
The patient has been examined and the H&P has been reviewed:    I concur with the findings and no changes have occurred since H&P was written.    Anesthesia/Surgery risks, benefits and alternative options discussed and understood by patient/family.          Active Hospital Problems    Diagnosis  POA    Postoperative CSF leak [G97.82]  Yes      Resolved Hospital Problems   No resolved problems to display.

## 2019-03-14 NOTE — TRANSFER OF CARE
"Anesthesia Transfer of Care Note    Patient: Brett Nugent    Procedure(s) Performed: Procedure(s) (LRB):  REVISION-WOUND  (Bilateral)  REPAIR, CSF LEAK, SPINE (Bilateral)    Patient location: PACU    Anesthesia Type: general    Transport from OR: Transported from OR on room air with adequate spontaneous ventilation    Post pain: adequate analgesia    Post assessment: no apparent anesthetic complications and tolerated procedure well    Post vital signs: stable    Level of consciousness: awake    Nausea/Vomiting: no nausea/vomiting    Complications: none    Transfer of care protocol was followed      Last vitals:   Visit Vitals  BP (!) 147/66 (BP Location: Right arm, Patient Position: Sitting)   Pulse 75   Temp 37 °C (98.6 °F) (Tympanic)   Resp 18   Ht 6' 2" (1.88 m)   Wt 72.6 kg (160 lb)   SpO2 98%   BMI 20.54 kg/m²     "

## 2019-03-15 ENCOUNTER — PATIENT MESSAGE (OUTPATIENT)
Dept: NEUROSURGERY | Facility: CLINIC | Age: 69
End: 2019-03-15

## 2019-03-15 PROBLEM — N40.0 ENLARGED PROSTATE: Status: ACTIVE | Noted: 2019-03-15

## 2019-03-15 PROBLEM — F41.9 ANXIETY: Status: ACTIVE | Noted: 2019-03-15

## 2019-03-15 PROCEDURE — 97530 THERAPEUTIC ACTIVITIES: CPT

## 2019-03-15 PROCEDURE — 94799 UNLISTED PULMONARY SVC/PX: CPT

## 2019-03-15 PROCEDURE — 97162 PT EVAL MOD COMPLEX 30 MIN: CPT

## 2019-03-15 PROCEDURE — 63600175 PHARM REV CODE 636 W HCPCS: Performed by: NEUROLOGICAL SURGERY

## 2019-03-15 PROCEDURE — 25000003 PHARM REV CODE 250: Performed by: NEUROLOGICAL SURGERY

## 2019-03-15 PROCEDURE — 25000003 PHARM REV CODE 250: Performed by: NURSE PRACTITIONER

## 2019-03-15 PROCEDURE — 11000001 HC ACUTE MED/SURG PRIVATE ROOM

## 2019-03-15 RX ORDER — METHOCARBAMOL 750 MG/1
750 TABLET, FILM COATED ORAL 3 TIMES DAILY PRN
Status: DISCONTINUED | OUTPATIENT
Start: 2019-03-15 | End: 2019-03-17 | Stop reason: HOSPADM

## 2019-03-15 RX ORDER — SIMVASTATIN 20 MG/1
40 TABLET, FILM COATED ORAL DAILY
Status: DISCONTINUED | OUTPATIENT
Start: 2019-03-15 | End: 2019-03-17 | Stop reason: HOSPADM

## 2019-03-15 RX ORDER — DIAZEPAM 5 MG/1
5 TABLET ORAL EVERY 8 HOURS PRN
Status: DISCONTINUED | OUTPATIENT
Start: 2019-03-15 | End: 2019-03-17 | Stop reason: HOSPADM

## 2019-03-15 RX ORDER — HYDRALAZINE HYDROCHLORIDE 20 MG/ML
10 INJECTION INTRAMUSCULAR; INTRAVENOUS EVERY 6 HOURS PRN
Status: DISCONTINUED | OUTPATIENT
Start: 2019-03-15 | End: 2019-03-17 | Stop reason: HOSPADM

## 2019-03-15 RX ORDER — VANCOMYCIN HCL IN 5 % DEXTROSE 1G/250ML
1000 PLASTIC BAG, INJECTION (ML) INTRAVENOUS
Status: DISCONTINUED | OUTPATIENT
Start: 2019-03-15 | End: 2019-03-16

## 2019-03-15 RX ADMIN — PANTOPRAZOLE SODIUM 40 MG: 40 TABLET, DELAYED RELEASE ORAL at 09:03

## 2019-03-15 RX ADMIN — CALCIUM 500 MG: 500 TABLET ORAL at 09:03

## 2019-03-15 RX ADMIN — ALPRAZOLAM 0.25 MG: 0.25 TABLET ORAL at 07:03

## 2019-03-15 RX ADMIN — BISACODYL 10 MG: 5 TABLET, COATED ORAL at 09:03

## 2019-03-15 RX ADMIN — VANCOMYCIN HYDROCHLORIDE 1000 MG: 1 INJECTION, POWDER, FOR SOLUTION INTRAVENOUS at 12:03

## 2019-03-15 RX ADMIN — ACETAMINOPHEN 650 MG: 325 TABLET ORAL at 03:03

## 2019-03-15 RX ADMIN — SIMVASTATIN 40 MG: 20 TABLET, FILM COATED ORAL at 12:03

## 2019-03-15 RX ADMIN — ALPRAZOLAM 0.25 MG: 0.25 TABLET ORAL at 03:03

## 2019-03-15 RX ADMIN — DIAZEPAM 5 MG: 5 TABLET ORAL at 11:03

## 2019-03-15 RX ADMIN — HYDROCODONE BITARTRATE AND ACETAMINOPHEN 1 TABLET: 10; 325 TABLET ORAL at 09:03

## 2019-03-15 RX ADMIN — ALPRAZOLAM 0.25 MG: 0.25 TABLET ORAL at 11:03

## 2019-03-15 RX ADMIN — DIAZEPAM 5 MG: 5 TABLET ORAL at 07:03

## 2019-03-15 RX ADMIN — ACETAMINOPHEN 650 MG: 325 TABLET ORAL at 09:03

## 2019-03-15 NOTE — SUBJECTIVE & OBJECTIVE
Past Medical History:   Diagnosis Date    Enlarged prostate     General anesthetics causing adverse effect in therapeutic use     Slow to wake    GERD (gastroesophageal reflux disease)     [place] (hard of hearing)     has hearing aids    Hyperlipidemia     Osteoporosis        Past Surgical History:   Procedure Laterality Date    FUSION, SPINE, POSTERIOR SPINAL COLUMN, CERVICAL, USING COMPUTER-ASSISTED NAVIGATION Bilateral 3/4/2019    [facility]    PROSTATE SURGERY      SINUS SURGERY         Review of patient's allergies indicates:   Allergen Reactions    Tetanus vaccines and toxoid Other (See Comments)     Always told he had a reaction to it       Current Facility-Administered Medications on File Prior to Encounter   Medication    chlorhexidine 0.12 % solution 10 mL    lidocaine (PF) 10 mg/ml (1%) injection 10 mg    nozaseptin (NOZIN) nasal      Current Outpatient Medications on File Prior to Encounter   Medication Sig    ALPRAZolam (XANAX) 0.25 MG tablet Take 1 tablet (0.25 mg total) by mouth 3 (three) times daily as needed for Anxiety or Insomnia.    bisacodyl (DULCOLAX) 5 mg EC tablet Take 2 tablets (10 mg total) by mouth 2 (two) times daily.    calcium carbonate (OS-JURGEN) 500 mg calcium (1,250 mg) tablet Take 1 tablet by mouth once daily.    cycloSPORINE (RESTASIS) 0.05 % ophthalmic emulsion Place 1 drop into both eyes 2 (two) times daily.    esomeprazole (NEXIUM) 40 MG capsule Take 1 capsule (40 mg total) by mouth once daily.    HYDROcodone-acetaminophen (NORCO)  mg per tablet Take 1 tablet by mouth every 4 (four) hours as needed.    methocarbamol (ROBAXIN) 750 MG Tab Take 1 tablet (750 mg total) by mouth 3 (three) times daily. for 10 days    nozaseptin (NOZIN) nasal  1 each by Each Nare route On call Procedure (surgery).    senna-docusate 8.6-50 mg (PERICOLACE) 8.6-50 mg per tablet Take 2 tablets by mouth nightly as needed for  Constipation.    simvastatin (ZOCOR) 40 MG tablet Take 1 tablet (40 mg total) by mouth every evening. (Patient taking differently: Take 40 mg by mouth once daily. )    sulfamethoxazole-trimethoprim 800-160mg (BACTRIM DS) 800-160 mg Tab Take 1 tablet by mouth 2 (two) times daily. for 10 days    vitamin D (VITAMIN D3) 1000 units Tab Take 1,000 Units by mouth once daily.    artificial tears (ISOPTO TEARS) 0.5 % ophthalmic solution Place 1 drop into both eyes as needed.    bacitracin 500 unit/gram Oint Apply topically once daily.    cephALEXin (KEFLEX) 500 MG capsule Take 1 capsule (500 mg total) by mouth every 8 (eight) hours. for 7 days     Family History     None        Tobacco Use    Smoking status: Never Smoker    Smokeless tobacco: Never Used   Substance and Sexual Activity    Alcohol use: No    Drug use: No    Sexual activity: Yes     Partners: Female     Review of Systems   Constitutional: Negative for activity change, appetite change, chills, fatigue and fever.   HENT: Negative for dental problem, ear pain, hearing loss, mouth sores, nosebleeds, sinus pressure, sore throat, tinnitus and trouble swallowing.    Eyes: Negative for pain, discharge and visual disturbance.   Respiratory: Negative for cough, choking, chest tightness, shortness of breath and wheezing.    Cardiovascular: Negative for chest pain, palpitations and leg swelling.   Gastrointestinal: Negative for abdominal distention, abdominal pain, anal bleeding, blood in stool, constipation, diarrhea, nausea and vomiting.   Endocrine: Negative for cold intolerance, heat intolerance, polydipsia, polyphagia and polyuria.   Genitourinary: Negative for decreased urine volume, difficulty urinating, flank pain, frequency, hematuria and urgency.   Musculoskeletal: Positive for neck pain and neck stiffness. Negative for arthralgias, back pain, gait problem and myalgias.   Skin: Positive for wound (surgical). Negative for color change and rash.    Allergic/Immunologic: Negative for food allergies and immunocompromised state.   Neurological: Negative for dizziness, tremors, seizures, syncope, speech difficulty, weakness, light-headedness and headaches.   Hematological: Negative for adenopathy. Does not bruise/bleed easily.   Psychiatric/Behavioral: Negative for agitation, behavioral problems, confusion and sleep disturbance. The patient is nervous/anxious.    All other systems reviewed and are negative.    Objective:     Vital Signs (Most Recent):  Temp: 98.7 °F (37.1 °C) (03/15/19 0733)  Pulse: 72 (03/15/19 0733)  Resp: 20 (03/15/19 0733)  BP: (!) 164/80 (03/15/19 0733)  SpO2: 100 % (03/15/19 0733) Vital Signs (24h Range):  Temp:  [97.3 °F (36.3 °C)-98.7 °F (37.1 °C)] 98.7 °F (37.1 °C)  Pulse:  [62-85] 72  Resp:  [10-25] 20  SpO2:  [93 %-100 %] 100 %  BP: (144-194)/(74-96) 164/80     Weight: 72.6 kg (160 lb 0 oz)  Body mass index is 20.54 kg/m².    Physical Exam   Constitutional: He is oriented to person, place, and time. He appears well-developed and well-nourished. No distress.   HENT:   Head: Normocephalic and atraumatic.   Nose: Nose normal.   Mouth/Throat: Oropharynx is clear and moist.   Eyes: Conjunctivae and EOM are normal. Pupils are equal, round, and reactive to light. Right eye exhibits no discharge. Left eye exhibits no discharge.   Neck: Normal range of motion. Neck supple. No JVD present. No tracheal deviation present. No thyromegaly present.   Cardiovascular: Normal rate, regular rhythm, normal heart sounds and intact distal pulses. Exam reveals no gallop and no friction rub.   No murmur heard.  Pulmonary/Chest: Effort normal and breath sounds normal. No respiratory distress. He has no wheezes. He has no rales. He exhibits no tenderness.   Abdominal: Soft. Bowel sounds are normal. He exhibits no distension and no mass. There is no tenderness.   Genitourinary:   Genitourinary Comments: Urinating independently   Musculoskeletal: He exhibits no  edema, tenderness or deformity.   Pt wearing cervical collar   Neurological: He is alert and oriented to person, place, and time. He exhibits normal muscle tone. Coordination normal.   Skin: Skin is warm and dry. Capillary refill takes less than 2 seconds. No rash noted. He is not diaphoretic. No erythema.   hemovac to spine with scant amount of drainage.   Psychiatric: He has a normal mood and affect. His behavior is normal.   Nursing note and vitals reviewed.      Significant Labs:   Recent Lab Results       03/14/19  1256        Anion Gap 10     Baso # 0.01     Basophil% 0.1     BUN, Bld 17     Calcium 8.5     Chloride 107     CO2 22     Creatinine 0.9     Differential Method Automated     eGFR if  >60     eGFR if non  >60  Comment:  Calculation used to obtain the estimated glomerular filtration  rate (eGFR) is the CKD-EPI equation.        Eos # 0.1     Eosinophil% 0.9     Glucose 146     Gran # (ANC) 9.4     Gran% 88.2     Hematocrit 40.7     Hemoglobin 13.4     Lymph # 1.0     Lymph% 9.5     MCH 29.5     MCHC 32.9     MCV 90     Mono # 0.1     Mono% 1.3     MPV 9.0     Platelets 210     Potassium 4.3     RBC 4.54     RDW 14.3     Sodium 139     WBC 10.63         All pertinent labs within the past 24 hours have been reviewed.    Significant Imaging: I have reviewed all pertinent imaging results/findings within the past 24 hours.

## 2019-03-15 NOTE — ANESTHESIA POSTPROCEDURE EVALUATION
"Anesthesia Post Evaluation    Patient: Brett Nugent    Procedure(s) Performed: Procedure(s) (LRB):  REVISION-WOUND  (Bilateral)  REPAIR, CSF LEAK, SPINE (Bilateral)    Final Anesthesia Type: general  Patient location during evaluation: PACU  Patient participation: Yes- Able to Participate  Level of consciousness: awake  Post-procedure vital signs: reviewed and stable  Pain management: adequate  Airway patency: patent  PONV status at discharge: No PONV  Anesthetic complications: no      Cardiovascular status: blood pressure returned to baseline  Respiratory status: unassisted, spontaneous ventilation and room air  Hydration status: euvolemic  Follow-up not needed.        Visit Vitals  BP (!) 164/80   Pulse 72   Temp 37.1 °C (98.7 °F)   Resp 20   Ht 6' 2" (1.88 m)   Wt 72.6 kg (160 lb 0 oz)   SpO2 100%   BMI 20.54 kg/m²       Pain/Khadra Score: Pain Rating Prior to Med Admin: 6 (3/15/2019  3:01 PM)  Pain Rating Post Med Admin: 5 (3/15/2019 12:39 AM)  Khadra Score: 8 (3/14/2019  1:15 PM)        "

## 2019-03-15 NOTE — HPI
67 y/o male with PMHx of HLD, osteoperosis, white coat hypertension, and anxiety that had CSF leak repaired yesterday by Dr. Wolfe (Neurosurgery). Pt had Discectomy on 3/6/18 and had CSF leak at post op f/u appointment. Post surgery, patient had increased anxiety and elevated blood pressure. Hospital Medicine was consulted for medical management. Currently, patient is sitting in chair beside bed, reporting pain is controlled, and bp is 164/80. Patient is a full code and his surrogate decision maker is his wife, Laura.

## 2019-03-15 NOTE — CONSULTS
[facility] Medicine  Consult Note    Patient Name: Brett Nugent  MRN: [id number]  Admission Date: 3/14/2019  Hospital Length of Stay: 1 days  Attending Physician: Helio Wolfe MD   Primary Care Provider: Leonardo Khan MD           Patient information was obtained from patient, spouse/SO and past medical records.     Consults  Subjective:     Principal Problem: Postoperative CSF leak    Chief Complaint: No chief complaint on file.       HPI: 67 y/o male with PMHx of HLD, osteoperosis, white coat hypertension, and anxiety that had CSF leak repaired yesterday by Dr. Wolfe (Neurosurgery). Pt had Discectomy on 3/6/18 and had CSF leak at post op f/u appointment. Post surgery, patient had increased anxiety and elevated blood pressure. Hospital Medicine was consulted for medical management. Currently, patient is sitting in chair beside bed, reporting pain is controlled, and bp is 164/80. Patient is a full code and his surrogate decision maker is his wife, Laura.    Past Medical History:   Diagnosis Date    Enlarged prostate     General anesthetics causing adverse effect in therapeutic use     Slow to wake    GERD (gastroesophageal reflux disease)     [place] (hard of hearing)     has hearing aids    Hyperlipidemia     Osteoporosis        Past Surgical History:   Procedure Laterality Date    FUSION, SPINE, POSTERIOR SPINAL COLUMN, CERVICAL, USING COMPUTER-ASSISTED NAVIGATION Bilateral 3/4/2019    [facility]    PROSTATE SURGERY      SINUS SURGERY         Review of patient's allergies indicates:   Allergen Reactions    Tetanus vaccines and toxoid Other (See Comments)     Always told he had a reaction to it       Current Facility-Administered Medications on File Prior to Encounter   Medication    chlorhexidine 0.12 % solution 10 mL    lidocaine (PF) 10 mg/ml (1%) injection 10 mg    nozaseptin (NOZIN) nasal      Current Outpatient Medications  on File Prior to Encounter   Medication Sig    ALPRAZolam (XANAX) 0.25 MG tablet Take 1 tablet (0.25 mg total) by mouth 3 (three) times daily as needed for Anxiety or Insomnia.    bisacodyl (DULCOLAX) 5 mg EC tablet Take 2 tablets (10 mg total) by mouth 2 (two) times daily.    calcium carbonate (OS-JURGEN) 500 mg calcium (1,250 mg) tablet Take 1 tablet by mouth once daily.    cycloSPORINE (RESTASIS) 0.05 % ophthalmic emulsion Place 1 drop into both eyes 2 (two) times daily.    esomeprazole (NEXIUM) 40 MG capsule Take 1 capsule (40 mg total) by mouth once daily.    HYDROcodone-acetaminophen (NORCO)  mg per tablet Take 1 tablet by mouth every 4 (four) hours as needed.    methocarbamol (ROBAXIN) 750 MG Tab Take 1 tablet (750 mg total) by mouth 3 (three) times daily. for 10 days    nozaseptin (NOZIN) nasal  1 each by Each Nare route On call Procedure (surgery).    senna-docusate 8.6-50 mg (PERICOLACE) 8.6-50 mg per tablet Take 2 tablets by mouth nightly as needed for Constipation.    simvastatin (ZOCOR) 40 MG tablet Take 1 tablet (40 mg total) by mouth every evening. (Patient taking differently: Take 40 mg by mouth once daily. )    sulfamethoxazole-trimethoprim 800-160mg (BACTRIM DS) 800-160 mg Tab Take 1 tablet by mouth 2 (two) times daily. for 10 days    vitamin D (VITAMIN D3) 1000 units Tab Take 1,000 Units by mouth once daily.    artificial tears (ISOPTO TEARS) 0.5 % ophthalmic solution Place 1 drop into both eyes as needed.    bacitracin 500 unit/gram Oint Apply topically once daily.    cephALEXin (KEFLEX) 500 MG capsule Take 1 capsule (500 mg total) by mouth every 8 (eight) hours. for 7 days     Family History     Reviewed and not pertinent        Tobacco Use    Smoking status: Never Smoker    Smokeless tobacco: Never Used   Substance and Sexual Activity    Alcohol use: No    Drug use: No    Sexual activity: Yes     Partners: Female     Review of Systems   Constitutional: Negative  for activity change, appetite change, chills, fatigue and fever.   HENT: Negative for dental problem, ear pain, hearing loss, mouth sores, nosebleeds, sinus pressure, sore throat, tinnitus and trouble swallowing.    Eyes: Negative for pain, discharge and visual disturbance.   Respiratory: Negative for cough, choking, chest tightness, shortness of breath and wheezing.    Cardiovascular: Negative for chest pain, palpitations and leg swelling.   Gastrointestinal: Negative for abdominal distention, abdominal pain, anal bleeding, blood in stool, constipation, diarrhea, nausea and vomiting.   Endocrine: Negative for cold intolerance, heat intolerance, polydipsia, polyphagia and polyuria.   Genitourinary: Negative for decreased urine volume, difficulty urinating, flank pain, frequency, hematuria and urgency.   Musculoskeletal: Positive for neck pain and neck stiffness. Negative for arthralgias, back pain, gait problem and myalgias.   Skin: Positive for wound (surgical). Negative for color change and rash.   Allergic/Immunologic: Negative for food allergies and immunocompromised state.   Neurological: Negative for dizziness, tremors, seizures, syncope, speech difficulty, weakness, light-headedness and headaches.   Hematological: Negative for adenopathy. Does not bruise/bleed easily.   Psychiatric/Behavioral: Negative for agitation, behavioral problems, confusion and sleep disturbance. The patient is nervous/anxious.    All other systems reviewed and are negative.    Objective:     Vital Signs (Most Recent):  Temp: 98.7 °F (37.1 °C) (03/15/19 0733)  Pulse: 72 (03/15/19 0733)  Resp: 20 (03/15/19 0733)  BP: (!) 164/80 (03/15/19 0733)  SpO2: 100 % (03/15/19 0733) Vital Signs (24h Range):  Temp:  [97.3 °F (36.3 °C)-98.7 °F (37.1 °C)] 98.7 °F (37.1 °C)  Pulse:  [62-85] 72  Resp:  [10-25] 20  SpO2:  [93 %-100 %] 100 %  BP: (144-194)/(74-96) 164/80     Weight: 72.6 kg (160 lb 0 oz)  Body mass index is 20.54 kg/m².    Physical Exam    Constitutional: He is oriented to person, place, and time. He appears well-developed and well-nourished. No distress.   HENT:   Head: Normocephalic and atraumatic.   Nose: Nose normal.   Mouth/Throat: Oropharynx is clear and moist.   Eyes: Conjunctivae and EOM are normal. Pupils are equal, round, and reactive to light. Right eye exhibits no discharge. Left eye exhibits no discharge.   Neck: Normal range of motion. Neck supple. No JVD present. No tracheal deviation present. No thyromegaly present.   Cardiovascular: Normal rate, regular rhythm, normal heart sounds and intact distal pulses. Exam reveals no gallop and no friction rub.   No murmur heard.  Pulmonary/Chest: Effort normal and breath sounds normal. No respiratory distress. He has no wheezes. He has no rales. He exhibits no tenderness.   Abdominal: Soft. Bowel sounds are normal. He exhibits no distension and no mass. There is no tenderness.   Genitourinary:   Genitourinary Comments: Urinating independently   Musculoskeletal: He exhibits no edema, tenderness or deformity.   Pt wearing cervical collar   Neurological: He is alert and oriented to person, place, and time. He exhibits normal muscle tone. Coordination normal.   Skin: Skin is warm and dry. Capillary refill takes less than 2 seconds. No rash noted. He is not diaphoretic. No erythema.   hemovac to spine with scant amount of drainage.   Psychiatric: He has a normal mood and affect. His behavior is normal.   Nursing note and vitals reviewed.      Significant Labs:   Recent Lab Results       03/14/19  1256        Anion Gap 10     Baso # 0.01     Basophil% 0.1     BUN, Bld 17     Calcium 8.5     Chloride 107     CO2 22     Creatinine 0.9     Differential Method Automated     eGFR if  >60     eGFR if non  >60  Comment:  Calculation used to obtain the estimated glomerular filtration  rate (eGFR) is the CKD-EPI equation.        Eos # 0.1     Eosinophil% 0.9     Glucose 146      Gran # (ANC) 9.4     Gran% 88.2     Hematocrit 40.7     Hemoglobin 13.4     Lymph # 1.0     Lymph% 9.5     MCH 29.5     MCHC 32.9     MCV 90     Mono # 0.1     Mono% 1.3     MPV 9.0     Platelets 210     Potassium 4.3     RBC 4.54     RDW 14.3     Sodium 139     WBC 10.63         All pertinent labs within the past 24 hours have been reviewed.    Significant Imaging: I have reviewed all pertinent imaging results/findings within the past 24 hours.    Assessment/Plan:     * Postoperative CSF leak    --surgical repair on 3/14  --Neurosurgery managing  --pt tolerating clear, liquid diet  --PRN analgesia  --C collar  --       Anxiety    --PRN alprazolam       Enlarged prostate    --managed OP by Dr. Dooley  --       White coat syndrome without diagnosis of hypertension    --PRN hydralazine  --monitor       Gastroesophageal reflux disease without esophagitis    --pantoprazole  --will continue Nexium on dc         Pure hypercholesterolemia    --managed OP by Dr. Thrasher  --continue statin         Age-related osteoporosis without current pathological fracture    --managed OP         VTE Risk Mitigation (From admission, onward)    None              Thank you for your consult. I will follow-up with patient. Please contact us if you have any additional questions.    DELICIA Ding  Department of Hospital Medicine   [facility] -

## 2019-03-15 NOTE — PLAN OF CARE
Problem: Adult Inpatient Plan of Care  Goal: Plan of Care Review  Outcome: Ongoing (interventions implemented as appropriate)  Fall precautions maintained. Pt free from injuries/falls.  Ambulates and repositions  C/o C collar intact. Patient up in chair x 2 today. Anxiety lessened this afternoon.   POC and meds discussed w/ pt. Pt verbalized understanding.  Chart check done.   Will cont to monitor.

## 2019-03-15 NOTE — PROGRESS NOTES
[facility] -   Neurosurgery  Progress Note    Subjective:     History of Present Illness: No notes on file    Post-Op Info:  Procedure(s) (LRB):  REVISION-WOUND  (Bilateral)  REPAIR, CSF LEAK, SPINE (Bilateral)   1 Day Post-Op     No new issues overnight   Headaches improved   Again had panic attack last night-xanax given   mari c collar  Neck pain controlled  No issues with extremities    mari liq diet   Cervantes removed this AM     Vitals/labs reviewed     MAEW  hemovac-scant output   Incision cdi       Assessment/Plan:     S/p posterior cervical decompression for myelopathy with post operative csf leak now repaired:  -Advance diet   -Continue vanc while hemovac drain in place   -PT   -Likely D/C hemovac hector AM     Hypertension:  -Will consult medicine for blood pressure/medical co-management       Urinary retention :  -pt with known prostate issues   -needed to be in and out cath last hospital stay   -medicine co-management    Helio Wolfe MD  Neurosurgery  [facility]

## 2019-03-15 NOTE — PT/OT/SLP EVAL
Physical Therapy Evaluation    Patient Name:  Brett Nugent   MRN:  [id number]    Recommendations:     Discharge Recommendations:  home health PT(24 HOUR S)   Discharge Equipment Recommendations: none   Barriers to discharge: None    Assessment:     Brett Nugent is a 68 y.o. male admitted with a medical diagnosis of Postoperative CSF leak.  He presents with the following impairments/functional limitations:  weakness, gait instability, decreased upper extremity function, impaired balance, decreased lower extremity function, impaired endurance, decreased safety awareness, pain, impaired self care skills, impaired functional mobilty, decreased ROM .    Rehab Prognosis: Good; patient would benefit from acute skilled PT services to address these deficits and reach maximum level of function.    Recent Surgery: Procedure(s) (LRB):  REVISION-WOUND  (Bilateral)  REPAIR, CSF LEAK, SPINE (Bilateral) 1 Day Post-Op    Plan:     During this hospitalization, patient to be seen   to address the identified rehab impairments via gait training, therapeutic activities, therapeutic exercises and progress toward the following goals:    · Plan of Care Expires:  03/22/19    Subjective     Chief Complaint: PAIN  Patient/Family Comments/goals: DEC PAIN/INC MOBILITY  Pain/Comfort:  · Pain Rating 1: 3/10  · Location 1: neck  · Pain Addressed 1: Reposition  · Pain Rating Post-Intervention 1: 5/10    Patients cultural, spiritual, Oriental orthodox conflicts given the current situation:      Living Environment:  PT LIVES AT HOME WITH WIFE AND HAS NO STEPS AND ONE STORY HOME TO ENTER   Prior to admission, patients level of function was MOD I WITH RW AND NO DRIVING.  Equipment used at home: walker, rolling.  DME owned (not currently used): single point cane and wheelchair.  Upon discharge, patient will have assistance from WIFE.    Objective:     Communicated with NURSE LITTLEJOHN AND [employer] CHART REVIEW prior to session.  Patient found SUP IN BED WITH HOB  ELEVATED>45 DEG peripheral IV, hemovac, cervical collar, oxygen  upon PT entry to room.    General Precautions: Standard, fall   Orthopedic Precautions:spinal precautions   Braces: Cervical collar     Exams:  · RLE ROM: WFL  · RLE Strength: 4-/5  · LLE ROM: WFL  · LLE Strength: 4-/5    Functional Mobility:  PT MET IN RM HX TAKEN. PT SEATED EOB WITH MIN A AND HOB ELEVATED. PT SCOOTED TO EOB AND MIN A. PT STOOD WITH RW  AND MIN A AND PRE GT ACTIVITY X 2 STEPS AND T/F TO CHAIR WITH RW AND MIN A. PT STOOD AND MIP X 5 REPS. PT RETURNED SEATED IN CHAIR AND EDUCATED ON ROLE OF P.T. PT LEFT SEATED WITH ALL NEEDS MET AND WIFE PRESENT.     AM-PAC 6 CLICK MOBILITY  Total Score:16     Patient left up in chair with call button in reach and WIFE present.    GOALS:   Multidisciplinary Problems     Physical Therapy Goals        Problem: Physical Therapy Goal    Goal Priority Disciplines Outcome Goal Variances Interventions   Physical Therapy Goal     PT, PT/OT      Description:  Pt will be seen for P.T. FOR A MIN OF 5 OUT OF 7 DAYS A WEEK  LTG: 3/22/19  1. PT WILL COMPLETE BED MOBILITY WITH HOB ELEVATED TO 45 DEG AND S  2. PT WILL T/F TO CHAIR WITH RW AND S  3. PT WILL GT TRAIN X 100' WITH RW AND S  4. PT WILL COMPLETE B LE TE X 20 REPS                    History:     Past Medical History:   Diagnosis Date    Anxiety     Enlarged prostate     General anesthetics causing adverse effect in therapeutic use     Slow to wake    GERD (gastroesophageal reflux disease)     [place] (hard of hearing)     has hearing aids    Hyperlipidemia     Osteoporosis     White coat syndrome without diagnosis of hypertension        Past Surgical History:   Procedure Laterality Date    FUSION, SPINE, POSTERIOR SPINAL COLUMN, CERVICAL, USING COMPUTER-ASSISTED NAVIGATION Bilateral 3/4/2019    Performed by Helio Wolfe [facility] at [facility] OR    PROSTATE SURGERY      SINUS SURGERY         Time Tracking:     PT Received On: 03/15/19  PT Start Time: 0850      PT Stop Time: 0915  PT Total Time (min): 25 min     Billable Minutes: Evaluation 15 and Gait Training 10      Mandi Newsome, PT  03/15/2019

## 2019-03-15 NOTE — PROGRESS NOTES
Pharmacy Vancomycin Consult Note    WBC=10.63  Tmax-98.7  CrCl=78.4ml/mn Scr=0.9    Dx. Surgical prophylaxis while hemovac drain in place (appx 2 days)  Goal trough=10-15mcg/ml    Current dose: Vancomycin 1 gram Q18  Trough due 3/16 @0530 before 3rd dose    Thank you for allowing us to participate in this patient's care.  Faye Lui D 3/15/2019 9:40 AM

## 2019-03-15 NOTE — PLAN OF CARE
Problem: Adult Inpatient Plan of Care  Goal: Plan of Care Review  Outcome: Ongoing (interventions implemented as appropriate)  Patient remained free from injury. C/o neck incisional pain. PRN pain meds administered as prescribed. Calm. Resting with eyes closed. Family at bedside. Cervical collar in place. HOB elevated to 45 degrees. No distress noted. Oriented x3. Respirations even and non labored. IV patent and infusing. Bed locked and low. Call light in reach. Safety measures in place. Will continue to monitor. Reviewed plan of care. Patient verbalized understanding and teach back.    12hr chart check complete.

## 2019-03-15 NOTE — ASSESSMENT & PLAN NOTE
--surgical repair on 3/14  --Neurosurgery managing  --pt tolerating clear, liquid diet  --PRN analgesia  --C collar  --

## 2019-03-15 NOTE — PLAN OF CARE
Problem: Adult Inpatient Plan of Care  Goal: Plan of Care Review  Outcome: Ongoing (interventions implemented as appropriate)  Patient remained free from injury. Hemovac in use. HOB at 45 degrees. No s/s of acute distress. 12hr chart check complete.

## 2019-03-16 LAB — VANCOMYCIN TROUGH SERPL-MCNC: 9 UG/ML

## 2019-03-16 PROCEDURE — 63600175 PHARM REV CODE 636 W HCPCS: Performed by: NEUROLOGICAL SURGERY

## 2019-03-16 PROCEDURE — 25000003 PHARM REV CODE 250: Performed by: NURSE PRACTITIONER

## 2019-03-16 PROCEDURE — 36415 COLL VENOUS BLD VENIPUNCTURE: CPT

## 2019-03-16 PROCEDURE — 80202 ASSAY OF VANCOMYCIN: CPT

## 2019-03-16 PROCEDURE — 25000003 PHARM REV CODE 250: Performed by: NEUROLOGICAL SURGERY

## 2019-03-16 PROCEDURE — 94799 UNLISTED PULMONARY SVC/PX: CPT

## 2019-03-16 PROCEDURE — 97116 GAIT TRAINING THERAPY: CPT

## 2019-03-16 PROCEDURE — 97110 THERAPEUTIC EXERCISES: CPT

## 2019-03-16 PROCEDURE — 99900035 HC TECH TIME PER 15 MIN (STAT)

## 2019-03-16 PROCEDURE — 11000001 HC ACUTE MED/SURG PRIVATE ROOM

## 2019-03-16 RX ORDER — VANCOMYCIN HCL IN 5 % DEXTROSE 1G/250ML
1000 PLASTIC BAG, INJECTION (ML) INTRAVENOUS
Status: DISCONTINUED | OUTPATIENT
Start: 2019-03-16 | End: 2019-03-17

## 2019-03-16 RX ADMIN — HYDROCODONE BITARTRATE AND ACETAMINOPHEN 1 TABLET: 10; 325 TABLET ORAL at 12:03

## 2019-03-16 RX ADMIN — VANCOMYCIN HYDROCHLORIDE 1000 MG: 1 INJECTION, POWDER, FOR SOLUTION INTRAVENOUS at 06:03

## 2019-03-16 RX ADMIN — ALPRAZOLAM 0.25 MG: 0.25 TABLET ORAL at 07:03

## 2019-03-16 RX ADMIN — DIAZEPAM 5 MG: 5 TABLET ORAL at 07:03

## 2019-03-16 RX ADMIN — ALPRAZOLAM 0.25 MG: 0.25 TABLET ORAL at 04:03

## 2019-03-16 RX ADMIN — CALCIUM 500 MG: 500 TABLET ORAL at 09:03

## 2019-03-16 RX ADMIN — DIAZEPAM 5 MG: 5 TABLET ORAL at 03:03

## 2019-03-16 RX ADMIN — PANTOPRAZOLE SODIUM 40 MG: 40 TABLET, DELAYED RELEASE ORAL at 09:03

## 2019-03-16 RX ADMIN — SIMVASTATIN 40 MG: 20 TABLET, FILM COATED ORAL at 09:03

## 2019-03-16 RX ADMIN — DIAZEPAM 5 MG: 5 TABLET ORAL at 11:03

## 2019-03-16 RX ADMIN — HYDROCODONE BITARTRATE AND ACETAMINOPHEN 1 TABLET: 10; 325 TABLET ORAL at 09:03

## 2019-03-16 RX ADMIN — BISACODYL 10 MG: 5 TABLET, COATED ORAL at 09:03

## 2019-03-16 RX ADMIN — BISACODYL 10 MG: 5 TABLET, COATED ORAL at 08:03

## 2019-03-16 NOTE — PT/OT/SLP PROGRESS
Physical Therapy  Treatment    Brett Nugent   MRN: [id number]   Admitting Diagnosis: Postoperative CSF leak    PT Received On: 03/16/19  PT Start Time: 0855     PT Stop Time: 0920    PT Total Time (min): 25 min       Billable Minutes:  Gait Training 15 and Therapeutic Exercise 10    Treatment Type: Treatment  PT/PTA: PTA     PTA Visit Number: 1       General Precautions: Standard, fall  Orthopedic Precautions: spinal precautions   Braces: Cervical collar    Spiritual, Cultural Beliefs, Nondenominational Practices, Values that Affect Care: no    Subjective:  Communicated with PATIENT NURSE, ARIN AND [facility] CHART REVIEW prior to session.  PATIENT AGREE TO TX NOW.    Pain/Comfort  Pain Rating 1: 4/10  Location - Side 1: Bilateral  Location - Orientation 1: posterior  Location 1: neck  Pain Addressed 1: Pre-medicate for activity, Reposition, Distraction, Cessation of Activity  Pain Rating Post-Intervention 1: 4/10    Objective:   Patient found with: peripheral IV, hemovac, cervical collar, SUPINE IN BED .ASSISTED PATIENT OOB T/FS TO GT ACTIVITY IN HALLWAY , THEN TO ROOM SHORTSEATED IN B/S CHAIR. PATIENT INSTRUCTED WITH GEORGIE LE EXERCISES SHORTSEATED ALL AVAILABLE PLANES.    Functional Mobility:  Bed Mobility:    SUPINE TO SIT AT CGAX1    Transfers:   SIT TO STAND , STAND TO SIT AT CGAX1 WITH RW.    Gait:    AMBULATE INTO HALLWAY AT CGAX1 WITH, 75'X2 , GOOD STEADY PACE.    Balance:   Static Sit: FAIR+: Able to take MINIMAL challenges from all directions  Dynamic Sit: FAIR+: Maintains balance through MINIMAL excursions of active trunk motion  Static Stand: FAIR: Maintains without assist but unable to take challenges  Dynamic stand: FAIR: Needs CONTACT GUARD during gait     Therapeutic Activities and Exercises:  GEORGIE LE EXERCISES OOB T/FS, GT INTO HALLWAY.    AM-PAC 6 CLICK MOBILITY  How much help from another person does this patient currently need?   1 = Unable, Total/Dependent Assistance  2 = A lot, Maximum/Moderate Assistance  3 =  A little, Minimum/Contact Guard/Supervision  4 = None, Modified [place]/Independent    Turning over in bed (including adjusting bedclothes, sheets and blankets)?: 3  Sitting down on and standing up from a chair with arms (e.g., wheelchair, bedside commode, etc.): 3  Moving from lying on back to sitting on the side of the bed?: 3  Moving to and from a bed to a chair (including a wheelchair)?: 3  Need to walk in hospital room?: 3  Climbing 3-5 steps with a railing?: 1  Basic Mobility Total Score: 16    AM-PAC Raw Score CMS G-Code Modifier Level of Impairment Assistance   6 % Total / Unable   7 - 9 CM 80 - 100% Maximal Assist   10 - 14 CL 60 - 80% Moderate Assist   15 - 19 CK 40 - 60% Moderate Assist   20 - 22 CJ 20 - 40% Minimal Assist   23 CI 1-20% SBA / CGA   24 CH 0% Independent/ Mod I     Patient left up in chair with all lines intact and call button in reach.    Assessment:  Brett Nugent is a 68 y.o. male with a medical diagnosis of Postoperative CSF leak . PATIENT PROGRESSING WELL WITH INCREASING HIS FUNCTIONAL MOBILITY NOTED WITH INCREASED GT DISTANCE AND INCREASED STEADINESS ON HIS FEET THIS TX.    Rehab identified problem list/impairments: Rehab identified problem list/impairments: weakness, gait instability, decreased upper extremity function, decreased ROM, impaired endurance, impaired balance, pain, impaired self care skills, impaired functional mobilty    Rehab potential is excellent.    Activity tolerance: Excellent    Discharge recommendations: Discharge Facility/Level of Care Needs: home health PT     Barriers to discharge:      Equipment recommendations: Equipment Needed After Discharge: none     GOALS:   Multidisciplinary Problems     Physical Therapy Goals        Problem: Physical Therapy Goal    Goal Priority Disciplines Outcome Goal Variances Interventions   Physical Therapy Goal     PT, PT/OT Ongoing (interventions implemented as appropriate)     Description:  Pt will be seen for  P.T. FOR A MIN OF 5 OUT OF 7 DAYS A WEEK  LTG: 3/22/19  1. PT WILL COMPLETE BED MOBILITY WITH HOB ELEVATED TO 45 DEG AND S  2. PT WILL T/F TO CHAIR WITH RW AND S  3. PT WILL GT TRAIN X 100' WITH RW AND S  4. PT WILL COMPLETE B LE TE X 20 REPS                    PLAN:    Patient to be seen 5 x/week  to address the above listed problems via gait training, therapeutic activities, therapeutic exercises  Plan of Care expires: 03/22/19  Plan of Care reviewed with: patient, daughter         Ingrid Ramesh, PTA  03/16/2019

## 2019-03-16 NOTE — PROGRESS NOTES
[facility] -   Neurosurgery  Progress Note    Subjective:     History of Present Illness: No notes on file    Post-Op Info:  Procedure(s) (LRB):  REVISION-WOUND  (Bilateral)  REPAIR, CSF LEAK, SPINE (Bilateral)   2 Days Post-Op   No issues overnight   Denies headache   Anxiety managed   Tolerated diet   Neck pain controlled   No complaints of extremity weakness got out of bed with PT yest     Vitals reviewed     c-collar in place   hemovac with minimal output   Incision flat without drainage noted   Dressing dry     Assessment/Plan:   Keep hemovac in place one more day   Continue vancomycin while drain in place   PT        Helio Wolfe MD  Neurosurgery  [facility]

## 2019-03-16 NOTE — PROGRESS NOTES
[facility] Hospital Medicine  Progress Note    Patient Name: Brett Nugent  MRN: [id number]  Patient Class: IP- Inpatient   Admission Date: 3/14/2019  Length of Stay: 2 days  Attending Physician: Helio Wolfe MD  Primary Care Provider: Leonardo Khan MD        Subjective:     Principal Problem:Postoperative CSF leak    HPI:  69 y/o male with PMHx of HLD, osteoperosis, white coat hypertension, and anxiety that had CSF leak repaired yesterday by Dr. Wolfe (Neurosurgery). Pt had Discectomy on 3/6/18 and had CSF leak at post op f/u appointment. Post surgery, patient had increased anxiety and elevated blood pressure. Hospital Medicine was consulted for medical management. Currently, patient is sitting in chair beside bed, reporting pain is controlled, and bp is 164/80. Patient is a full code and his surrogate decision maker is his wife, Laura.    Hospital Course:  On 3/14 patient was admitted to Medicine s/p revision of wound and repair of CSF leak per Dr. Wolfe on 3/14 (s/p cervical spinal fusion on 3/4) .  Hospital Medicine consulted post-operatively for medical management.      As of 3/16 patient is sitting up in bedside chair and has no complaints.  He denies weakness, numbness, and tingling and reports he ambulated in the moser today.  Reports pain is well controlled.  BP is much better today and he has no medical complaints.  Hospital Medicine will sign off, but we are available if needed.      Interval History:  No acute events overnight.  Family at .  Patient is sitting up in bedside chair and has no complaints.  He denies weakness, numbness, and tingling and reports he ambulated in the moser today.  Reports pain is well controlled.  BP is much better today and he has no medical complaints.  Hospital Medicine will sign off, but we are available if needed.      Review of Systems   Constitutional: Negative for activity change, appetite change, chills, fatigue and fever.   HENT: Negative for  dental problem, ear pain, hearing loss, mouth sores, nosebleeds, sinus pressure, sore throat, tinnitus and trouble swallowing.    Eyes: Negative for pain, discharge and visual disturbance.   Respiratory: Negative for cough, choking, chest tightness, shortness of breath and wheezing.    Cardiovascular: Negative for chest pain, palpitations and leg swelling.   Gastrointestinal: Negative for abdominal distention, abdominal pain, anal bleeding, blood in stool, constipation, diarrhea, nausea and vomiting.   Endocrine: Negative for cold intolerance, heat intolerance, polydipsia, polyphagia and polyuria.   Genitourinary: Negative for decreased urine volume, difficulty urinating, flank pain, frequency, hematuria and urgency.   Musculoskeletal: Positive for neck pain and neck stiffness. Negative for arthralgias, back pain, gait problem and myalgias.   Skin: Positive for wound (surgical). Negative for color change and rash.   Allergic/Immunologic: Negative for food allergies and immunocompromised state.   Neurological: Negative for dizziness, tremors, seizures, syncope, speech difficulty, weakness, light-headedness and headaches.   Hematological: Negative for adenopathy. Does not bruise/bleed easily.   Psychiatric/Behavioral: Negative for agitation, behavioral problems, confusion and sleep disturbance. The patient is not nervous/anxious.    All other systems reviewed and are negative.    Objective:     Vital Signs (Most Recent):  Temp: 98 °F (36.7 °C) (03/16/19 0737)  Pulse: 70 (03/16/19 0852)  Resp: 16 (03/16/19 0852)  BP: 130/77 (03/16/19 0737)  SpO2: 97 % (03/16/19 0852) Vital Signs (24h Range):  Temp:  [98 °F (36.7 °C)-98.8 °F (37.1 °C)] 98 °F (36.7 °C)  Pulse:  [63-70] 70  Resp:  [16-20] 16  SpO2:  [94 %-100 %] 97 %  BP: (122-136)/(64-78) 130/77     Weight: 72.6 kg (160 lb 0 oz)  Body mass index is 20.54 kg/m².    Intake/Output Summary (Last 24 hours) at 3/16/2019 1026  Last data filed at 3/16/2019 0914  Gross per 24  hour   Intake 770 ml   Output 1880 ml   Net -1110 ml      Physical Exam   Constitutional: He is oriented to person, place, and time. He appears well-developed and well-nourished. No distress.   HENT:   Head: Normocephalic and atraumatic.   Nose: Nose normal.   Mouth/Throat: Oropharynx is clear and moist.   Eyes: Conjunctivae and EOM are normal. Pupils are equal, round, and reactive to light. Right eye exhibits no discharge. Left eye exhibits no discharge.   Neck: Normal range of motion. Neck supple. No JVD present. No tracheal deviation present. No thyromegaly present.   Cardiovascular: Normal rate, regular rhythm, normal heart sounds and intact distal pulses. Exam reveals no gallop and no friction rub.   No murmur heard.  Pulmonary/Chest: Effort normal and breath sounds normal. No respiratory distress. He has no wheezes. He has no rales. He exhibits no tenderness.   Abdominal: Soft. Bowel sounds are normal. He exhibits no distension and no mass. There is no tenderness.   Genitourinary:   Genitourinary Comments: Urinating independently   Musculoskeletal: He exhibits no edema, tenderness or deformity.   Pt wearing cervical collar   Neurological: He is alert and oriented to person, place, and time. He exhibits normal muscle tone. Coordination normal.   Skin: Skin is warm and dry. Capillary refill takes less than 2 seconds. No rash noted. He is not diaphoretic. No erythema.   hemovac to spine with scant amount of drainage.   Psychiatric: He has a normal mood and affect. His behavior is normal.   Nursing note and vitals reviewed.      Significant Labs:   BMP:   Recent Labs   Lab 03/14/19  1256   *      K 4.3      CO2 22*   BUN 17   CREATININE 0.9   CALCIUM 8.5*     CBC:   Recent Labs   Lab 03/14/19  1256   WBC 10.63   HGB 13.4*   HCT 40.7          Significant Imaging: I have reviewed all pertinent imaging results/findings within the past 24 hours.    Assessment/Plan:      * Postoperative CSF  leak    --s/p surgical repair on 3/14 per Dr. Wolfe (s/p cervical spine fusion on 3/4)  --Neurosurgery is primary  --Hospital Medicine consulted post-operatively for medical management  --PRN analgesia  --C collar  --PT/OT   --Patient is medically stable, therefore Hospital Medicine will sign off, but we are available if needed       Age-related osteoporosis without current pathological fracture    --managed OP       White coat syndrome without diagnosis of hypertension    --Bp much better now  --PRN hydralazine  --monitor       Anxiety    --PRN alprazolam       Pure hypercholesterolemia    --managed OP by Dr. Thrasher  --continue statin         Gastroesophageal reflux disease without esophagitis    --pantoprazole  --will continue Nexium on dc         Enlarged prostate    --managed OP by Dr. Dooley           VTE Risk Mitigation (From admission, onward)    None   Defer to Neurosurgery given recent surgery. Patient is ambulatory in the moser.           Cher Mei, DNP, ACNP-BC  Department of Hospital Medicine   [facility]

## 2019-03-16 NOTE — PLAN OF CARE
Problem: Adult Inpatient Plan of Care  Goal: Plan of Care Review  Outcome: Ongoing (interventions implemented as appropriate)  Free from injury. Pain and anxiety controlled. Neck brace in place. Hemovac to bulb suction. Chart check completed

## 2019-03-16 NOTE — SUBJECTIVE & OBJECTIVE
Interval History:  No acute events overnight.  Family at .  Patient is sitting up in bedside chair and has no complaints.  He denies weakness, numbness, and tingling and reports he ambulated in the moser today.  Reports pain is well controlled.  BP is much better today and he has no medical complaints.  Hospital Medicine will sign off, but we are available if needed.      Review of Systems   Constitutional: Negative for activity change, appetite change, chills, fatigue and fever.   HENT: Negative for dental problem, ear pain, hearing loss, mouth sores, nosebleeds, sinus pressure, sore throat, tinnitus and trouble swallowing.    Eyes: Negative for pain, discharge and visual disturbance.   Respiratory: Negative for cough, choking, chest tightness, shortness of breath and wheezing.    Cardiovascular: Negative for chest pain, palpitations and leg swelling.   Gastrointestinal: Negative for abdominal distention, abdominal pain, anal bleeding, blood in stool, constipation, diarrhea, nausea and vomiting.   Endocrine: Negative for cold intolerance, heat intolerance, polydipsia, polyphagia and polyuria.   Genitourinary: Negative for decreased urine volume, difficulty urinating, flank pain, frequency, hematuria and urgency.   Musculoskeletal: Positive for neck pain and neck stiffness. Negative for arthralgias, back pain, gait problem and myalgias.   Skin: Positive for wound (surgical). Negative for color change and rash.   Allergic/Immunologic: Negative for food allergies and immunocompromised state.   Neurological: Negative for dizziness, tremors, seizures, syncope, speech difficulty, weakness, light-headedness and headaches.   Hematological: Negative for adenopathy. Does not bruise/bleed easily.   Psychiatric/Behavioral: Negative for agitation, behavioral problems, confusion and sleep disturbance. The patient is not nervous/anxious.    All other systems reviewed and are negative.    Objective:     Vital Signs (Most  Recent):  Temp: 98 °F (36.7 °C) (03/16/19 0737)  Pulse: 70 (03/16/19 0852)  Resp: 16 (03/16/19 0852)  BP: 130/77 (03/16/19 0737)  SpO2: 97 % (03/16/19 0852) Vital Signs (24h Range):  Temp:  [98 °F (36.7 °C)-98.8 °F (37.1 °C)] 98 °F (36.7 °C)  Pulse:  [63-70] 70  Resp:  [16-20] 16  SpO2:  [94 %-100 %] 97 %  BP: (122-136)/(64-78) 130/77     Weight: 72.6 kg (160 lb 0 oz)  Body mass index is 20.54 kg/m².    Intake/Output Summary (Last 24 hours) at 3/16/2019 1026  Last data filed at 3/16/2019 0914  Gross per 24 hour   Intake 770 ml   Output 1880 ml   Net -1110 ml      Physical Exam   Constitutional: He is oriented to person, place, and time. He appears well-developed and well-nourished. No distress.   HENT:   Head: Normocephalic and atraumatic.   Nose: Nose normal.   Mouth/Throat: Oropharynx is clear and moist.   Eyes: Conjunctivae and EOM are normal. Pupils are equal, round, and reactive to light. Right eye exhibits no discharge. Left eye exhibits no discharge.   Neck: Normal range of motion. Neck supple. No JVD present. No tracheal deviation present. No thyromegaly present.   Cardiovascular: Normal rate, regular rhythm, normal heart sounds and intact distal pulses. Exam reveals no gallop and no friction rub.   No murmur heard.  Pulmonary/Chest: Effort normal and breath sounds normal. No respiratory distress. He has no wheezes. He has no rales. He exhibits no tenderness.   Abdominal: Soft. Bowel sounds are normal. He exhibits no distension and no mass. There is no tenderness.   Genitourinary:   Genitourinary Comments: Urinating independently   Musculoskeletal: He exhibits no edema, tenderness or deformity.   Pt wearing cervical collar   Neurological: He is alert and oriented to person, place, and time. He exhibits normal muscle tone. Coordination normal.   Skin: Skin is warm and dry. Capillary refill takes less than 2 seconds. No rash noted. He is not diaphoretic. No erythema.   hemovac to spine with scant amount of  drainage.   Psychiatric: He has a normal mood and affect. His behavior is normal.   Nursing note and vitals reviewed.      Significant Labs:   BMP:   Recent Labs   Lab 03/14/19  1256   *      K 4.3      CO2 22*   BUN 17   CREATININE 0.9   CALCIUM 8.5*     CBC:   Recent Labs   Lab 03/14/19  1256   WBC 10.63   HGB 13.4*   HCT 40.7          Significant Imaging: I have reviewed all pertinent imaging results/findings within the past 24 hours.

## 2019-03-16 NOTE — HOSPITAL COURSE
On 3/14 patient was admitted to Medicine s/p revision of wound and repair of CSF leak per Dr. Wolfe on 3/14 (s/p cervical spinal fusion on 3/4) .  Hospital Medicine consulted post-operatively for medical management.      As of 3/16 patient is sitting up in bedside chair and has no complaints.  He denies weakness, numbness, and tingling and reports he ambulated in the moser today.  Reports pain is well controlled.  BP is much better today and he has no medical complaints.  Hospital Medicine will sign off, but we are available if needed.

## 2019-03-16 NOTE — ASSESSMENT & PLAN NOTE
--s/p surgical repair on 3/14 per Dr. Wolfe (s/p cervical spine fusion on 3/4)  --Neurosurgery is primary  --Hospital Medicine consulted post-operatively for medical management  --PRN analgesia  --C collar  --PT/OT   --Patient is medically stable, therefore Hospital Medicine will sign off, but we are available if needed

## 2019-03-16 NOTE — PLAN OF CARE
Problem: Adult Inpatient Plan of Care  Goal: Patient-Specific Goal (Individualization)  Outcome: Ongoing (interventions implemented as appropriate)  Pt in bed AAOx4. Plan of Care reviewed, Verbalized understanding.   Patient remained free from falls, fall precautions in place.  Family at the bedside. HOB @60-90 degree angle. Neck collar in place.  Call bell and personal belongings within reach. Hourly rounding complete. Reminded to call for assistance. No complaints at this time. Will continue to monitor.

## 2019-03-16 NOTE — PLAN OF CARE
Problem: Physical Therapy Goal  Goal: Physical Therapy Goal  Pt will be seen for P.T. FOR A MIN OF 5 OUT OF 7 DAYS A WEEK  LTG: 3/22/19  1. PT WILL COMPLETE BED MOBILITY WITH HOB ELEVATED TO 45 DEG AND S  2. PT WILL T/F TO CHAIR WITH RW AND S  3. PT WILL GT TRAIN X 100' WITH RW AND S  4. PT WILL COMPLETE B LE TE X 20 REPS   Outcome: Ongoing (interventions implemented as appropriate)  PATIENT DOING BETTER TODAY WITH GT INTO HALLWAY 75'X2 , GOOD STEADY PACE WITH RW AT CGAX1. PATIENT STEADY ON HIS FEET.

## 2019-03-17 VITALS
DIASTOLIC BLOOD PRESSURE: 61 MMHG | WEIGHT: 160 LBS | SYSTOLIC BLOOD PRESSURE: 113 MMHG | BODY MASS INDEX: 20.53 KG/M2 | RESPIRATION RATE: 16 BRPM | TEMPERATURE: 99 F | HEIGHT: 74 IN | OXYGEN SATURATION: 97 % | HEART RATE: 55 BPM

## 2019-03-17 PROCEDURE — 99900035 HC TECH TIME PER 15 MIN (STAT)

## 2019-03-17 PROCEDURE — 97116 GAIT TRAINING THERAPY: CPT

## 2019-03-17 PROCEDURE — 25000003 PHARM REV CODE 250: Performed by: NEUROLOGICAL SURGERY

## 2019-03-17 PROCEDURE — 63600175 PHARM REV CODE 636 W HCPCS: Performed by: NEUROLOGICAL SURGERY

## 2019-03-17 PROCEDURE — 97110 THERAPEUTIC EXERCISES: CPT

## 2019-03-17 PROCEDURE — 25000003 PHARM REV CODE 250: Performed by: NURSE PRACTITIONER

## 2019-03-17 RX ORDER — SULFAMETHOXAZOLE AND TRIMETHOPRIM 800; 160 MG/1; MG/1
1 TABLET ORAL 2 TIMES DAILY
Qty: 20 TABLET | Refills: 0 | Status: SHIPPED | OUTPATIENT
Start: 2019-03-17 | End: 2019-03-27

## 2019-03-17 RX ORDER — DIAZEPAM 5 MG/1
5 TABLET ORAL EVERY 6 HOURS PRN
Qty: 60 TABLET | Refills: 0 | Status: SHIPPED | OUTPATIENT
Start: 2019-03-17 | End: 2019-05-28

## 2019-03-17 RX ADMIN — OXYCODONE AND ACETAMINOPHEN 1 TABLET: 5; 325 TABLET ORAL at 08:03

## 2019-03-17 RX ADMIN — ALPRAZOLAM 0.25 MG: 0.25 TABLET ORAL at 07:03

## 2019-03-17 RX ADMIN — DIAZEPAM 5 MG: 5 TABLET ORAL at 03:03

## 2019-03-17 RX ADMIN — SIMVASTATIN 40 MG: 20 TABLET, FILM COATED ORAL at 08:03

## 2019-03-17 RX ADMIN — PANTOPRAZOLE SODIUM 40 MG: 40 TABLET, DELAYED RELEASE ORAL at 08:03

## 2019-03-17 RX ADMIN — CALCIUM 500 MG: 500 TABLET ORAL at 08:03

## 2019-03-17 RX ADMIN — VANCOMYCIN HYDROCHLORIDE 1000 MG: 1 INJECTION, POWDER, FOR SOLUTION INTRAVENOUS at 05:03

## 2019-03-17 RX ADMIN — BISACODYL 10 MG: 5 TABLET, COATED ORAL at 08:03

## 2019-03-17 NOTE — OP NOTE
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 Primary repair of csf leak requiring laminectomy     Description of the Findings of the Procedure: csf leak       Complications: No    Estimated Blood Loss (EBL): 150 mL           Specimens:   Specimen (12h ago, onward)    None           Implants:   Implant Name Type Inv. Item Serial No.  Lot No. LRB No. Used   [employer] [id number]   [id number]   N/A 1              Condition: Good    Disposition: PACU - hemodynamically stable.    Attestation: I was present and scrubbed for the entire procedure.     Patient is a 68-year-old gentleman who approximately 10 days prior underwent a posterior cervical decompression with lateral mass fusion.  Postoperatively patient complained of headaches and dizziness.  CT scan and MRI showed no issues with the hardware however there was a postoperative fluid collection that was suspicious for potential CSF leak.  He was monitored in the hospital was denying any headaches and his dizziness improved.  He was ambulating in the moser and he was subsequently discharged home postoperative day 5 following the initial operation.  I have followed him closely in the clinic in appr his incision oximately 10 days postoperatively he developed some mild drainage to the inferior portion of his incision. He was  not having any clinical signs and symptoms of CSF leak however due to an abundance of caution I counseled the patient on treatment options including lumbar drain placement versus exploration and primary closure of the wound.  After considering the options patient did agree elect to undergo primary exploration with closure of CSF leak.  He was informed of the risk and benefits including pain infection bleeding, paralysis and death.  Continued CSF leak.  Need for lumbar drain in the future.  Need for revision of the incision in the future as well    After informed consent was obtained and secured to the chart patient was taken back with anesthesia on the stretcher intubated without any issues.  Next he was placed in the Vega pins and rolled onto the Thuan table in a prone position.  Once the arms were tucked the head was then fastened to the Vega.  All contact points were carefully padded.  The eyes were taped to prevent any corneal abrasion.  A Vinod Hugger was placed inferiorly for temperature control.  Preoperative antibiotics were given.  Next the staples were removed, and the wound was prepped with a Betadine solution.    The patient was then prepped and draped in the usual sterile fashion.  A midline incision was made down through the fascia.  The prior suture were removed with a hemostat.  There was a fluid collection which appeared to be a mixture of CSF and seroma from the prior incision cavity.  This was copiously irrigated next the fascia was dissected and the muscle was retracted back to reveal the underlying hardware.  The thecal sac was then identified.  Again irrigation was used to clean the cavity using an upgoing curette the edge of the lateral mass was identified.  To have better visualization the microscope was then prepped and draped and brought into the field.  Using the Veosearchas high-speed drill the lateral mass was then decorticated further both laterally on the left side as well as the  right.  Using a Kerrison the lateral mass and remaining edges of the lamina removed laterally.  At this point I tried to identify any signs of a CSF leak.  A Valsalva was performed by anesthesia to 40 and was held.  At this point I try to identified any signs of a CSF leak.  No signs of a CSF leak was identified.  Further dissection was carried out laterally.  The foramen was traced out and the exiting nerve roots bilaterally were identified.  Once the thecal sac and exiting nerve root were skeletonized again a Valsalva to 40 was performed by Anesthesia and held.  At this point I could not find a source of the CSF leak.  The wound was copiously irrigated with a vancomycin solution.  Because the assumption was patient did have a CSF leak DuraSeal was then mixed and placed to the lateral gutters as well as above the thecal sac.  Again a Valsalva was performed and there was no signs of any clear drainage coming from around the thecal sac or exiting nerve roots.  At this point attention was turned towards closing the incision.    1 g of vancomycin powder was placed to the suprafascial as well as inferior fascial level.  A 1/8 inch Hemovac was placed above the fascia.  The muscle layer and dead space was closed using an 0 Vicryl in the usual fashion.  Next the subcutaneous layer was closed with 2 0 Vicryl in an inverted fashion.  A 3-0 nylon was used to close the skin in a running locked fashion.  Dermabond was placed superficially over the incision. Exparel was used into the muscle layer for postoperative pain control.  The Hemovac drain was secured with the 2-0 Vicryl.  A dressing was placed superficially.    Sponge counts needle count and instrument count was correct at the end of the case x 2.  Patient tolerated the procedure well.  He was rolled supine onto the preoperative stretcher, taken out of the [facility], extubated with anesthesia, and taken to the recovery room without any incident.

## 2019-03-17 NOTE — PLAN OF CARE
03/17/19 0826   Medicare Message   Important Message from Medicare regarding Discharge Appeal Rights Given to patient/caregiver;Explained to patient/caregiver;Signed/date by patient/caregiver   Date IMM was signed 03/17/19   Time IMM was signed 0824

## 2019-03-17 NOTE — PLAN OF CARE
Problem: Adult Inpatient Plan of Care  Goal: Plan of Care Review  Outcome: Ongoing (interventions implemented as appropriate)  Pain and anxiety controlled. Free from injury. No s/s of infection. Neck brace in place. hemovac noted. Chart check completed. Pt d/c to home.

## 2019-03-17 NOTE — PLAN OF CARE
Discharge Planning:    Discharge orders reviewed by CM.  Patient ready for discharge from CM standpoint.      Patient discharged home with [facility].

## 2019-03-17 NOTE — PLAN OF CARE
03/17/19 1545   Final Note   Assessment Type Final Discharge Note   Anticipated Discharge Disposition Home-Health   Right Care Referral Info   Post Acute Recommendation Home-care   Facility Name [facility]

## 2019-03-17 NOTE — PLAN OF CARE
Problem: Physical Therapy Goal  Goal: Physical Therapy Goal  Pt will be seen for P.T. FOR A MIN OF 5 OUT OF 7 DAYS A WEEK  LTG: 3/22/19  1. PT WILL COMPLETE BED MOBILITY WITH HOB ELEVATED TO 45 DEG AND S  2. PT WILL T/F TO CHAIR WITH RW AND S  3. PT WILL GT TRAIN X 100' WITH RW AND S  4. PT WILL COMPLETE B LE TE X 20 REPS   Outcome: Ongoing (interventions implemented as appropriate)  PATIENT DID VERY WELL WITH ALL THERAPEUTIC ACTIVITIES.

## 2019-03-17 NOTE — DISCHARGE SUMMARY
[facility]  Discharge Note  Short Stay    Admit Date: 3/14/2019    Discharge Date and Time: No discharge date for patient encounter.     Attending Physician: Helio Wolfe MD     Discharge Provider: Helio Wolfe    Diagnoses:  Active Hospital Problems    Diagnosis  POA    *Postoperative CSF leak [G97.82]  Yes    Enlarged prostate [N40.0]  Yes    Anxiety [F41.9]  Yes    White coat syndrome without diagnosis of hypertension [R03.0]  Yes    Pure hypercholesterolemia [E78.00]  Yes    Gastroesophageal reflux disease without esophagitis [K21.9]  Yes    Age-related osteoporosis without current pathological fracture [M81.0]  Yes      Resolved Hospital Problems   No resolved problems to display.       Discharged Condition: good    Hospital Course:  Patient admitted for postoperative CSF leak which was repaired without any difficulty.  Patient remained in the hospital for postoperative drain and wound management.  He was discharged on March 17th with the dressing intact wound flat and patient not having any other neurologic symptoms    Final Diagnoses: Same as principal problem.    Dis[place] [facility]    Follow up/Patient Instructions:    Medications:  Reconciled Home Medications:      Medication List      START taking these medications    diazePAM 5 MG tablet  Commonly known as:  VALIUM  Take 1 tablet (5 mg total) by mouth every 6 (six) hours as needed for Anxiety (muscle spasms).        CHANGE how you take these medications    simvastatin 40 MG tablet  Commonly known as:  ZOCOR  Take 1 tablet (40 mg total) by mouth every evening.  What changed:  when to take this        CONTINUE taking these medications    ALPRAZolam 0.25 MG tablet  Commonly known as:  XANAX  Take 1 tablet (0.25 mg total) by mouth 3 (three) times daily as needed for Anxiety or Insomnia.     artificial tears 0.5 % ophthalmic solution  Commonly known as:  ISOPTO TEARS  Place 1 drop into both eyes as needed.      bacitracin 500 unit/gram Oint  Apply topically once daily.     bisacodyl 5 mg EC tablet  Commonly known as:  DULCOLAX  Take 2 tablets (10 mg total) by mouth 2 (two) times daily.     calcium carbonate 500 mg calcium (1,250 mg) tablet  Commonly known as:  OS-JURGEN  Take 1 tablet by mouth once daily.     cephALEXin 500 MG capsule  Commonly known as:  KEFLEX  Take 1 capsule (500 mg total) by mouth every 8 (eight) hours. for 7 days     cycloSPORINE 0.05 % ophthalmic emulsion  Commonly known as:  RESTASIS  Place 1 drop into both eyes 2 (two) times daily.     esomeprazole 40 MG capsule  Commonly known as:  NEXIUM  Take 1 capsule (40 mg total) by mouth once daily.     HYDROcodone-acetaminophen  mg per tablet  Commonly known as:  NORCO  Take 1 tablet by mouth every 4 (four) hours as needed.     methocarbamol 750 MG Tab  Commonly known as:  ROBAXIN  Take 1 tablet (750 mg total) by mouth 3 (three) times daily. for 10 days     nozaseptin nasal   Commonly known as:  NOZIN  1 each by Each Nare route On call Procedure (surgery).     senna-docusate 8.6-50 mg 8.6-50 mg per tablet  Commonly known as:  PERICOLACE  Take 2 tablets by mouth nightly as needed for Constipation.     sulfamethoxazole-trimethoprim 800-160mg 800-160 mg Tab  Commonly known as:  BACTRIM DS  Take 1 tablet by mouth 2 (two) times daily. for 10 days     vitamin D 1000 units Tab  Commonly known as:  VITAMIN D3  Take 1,000 Units by mouth once daily.          Discharge Procedure Orders   Change dressing (specify)   Order Comments: Dressing change: change dressing once daily   Apply thin layer of bacitracin ointment and cover with a dressing   OK to shower keep dry afterward     Follow-up Information     [facility].    Specialty:  Home Health Services  Why:  Home Health  Contact information:  [address] at the [facility] [address], [address]  [facility] [address] [phone]             Follow up In 2 weeks.    Why:  suture removal                   Discharge Procedure Orders (must include Diet, Follow-up, Activity):   Discharge Procedure Orders (must include Diet, Follow-up, Activity)   Change dressing (specify)   Order Comments: Dressing change: change dressing once daily   Apply thin layer of bacitracin ointment and cover with a dressing   OK to shower keep dry afterward

## 2019-03-17 NOTE — PLAN OF CARE
Problem: Adult Inpatient Plan of Care  Goal: Plan of Care Review  Outcome: Ongoing (interventions implemented as appropriate)  Patient remained free from injury. Neck collar in place. Hemovac to bulb to suction. No s/s of acute distress. 12hr chart check complete.

## 2019-03-17 NOTE — PROGRESS NOTES
[facility]  Neurosurgery  Progress Note    Subjective:     History of Present Illness: No notes on file    Post-Op Info:  Procedure(s) (LRB):  REVISION-WOUND  (Bilateral)  REPAIR, CSF LEAK, SPINE (Bilateral)   3 Days Post-Op   Yesterday evening HV drain became on connected  Reattached distal end without any issue  Drain left overnight without any output  Removed today without any difficulty  No other complaints this morning    Denies headache   Anxiety managed   Tolerated diet   Neck pain controlled   No complaints of extremity weakness got out of bed without difficulty    Vitals reviewed     c-collar in place   hemovac with minimal output   Incision flat without drainage noted   Dressing dry     Assessment/Plan:   Hemovac discontinued  DC to home  Follow-up in 2 weeks for suture removal        Helio Wolfe MD  Neurosurgery  [facility]

## 2019-03-17 NOTE — PROGRESS NOTES
Pt discharged home. D/c instructions given to pt and daughter at the bedside, verbalized understanding. Pt left unit.

## 2019-03-17 NOTE — PT/OT/SLP PROGRESS
Physical Therapy  Treatment    Brett Nugent   MRN: [id number]   Admitting Diagnosis: Postoperative CSF leak    PT Received On: 03/17/19  PT Start Time: 0840     PT Stop Time: 0905    PT Total Time (min): 25 min       Billable Minutes:  Gait Training 15 and Therapeutic Exercise 10    Treatment Type: Treatment  PT/PTA: PTA     PTA Visit Number: 2       General Precautions: Standard, fall  Orthopedic Precautions: spinal precautions   Braces: Cervical collar    Spiritual, Cultural Beliefs, Muslim Practices, Values that Affect Care: no    Subjective:  Communicated with NURSE, ARIN AND Epic CHART REVIEW  prior to session.  PATIENT AGREE TO TX NOW.    Pain/Comfort  Pain Rating 1: 2/10  Location - Side 1: Bilateral  Location - Orientation 1: upper  Location 1: neck  Pain Addressed 1: Pre-medicate for activity, Cessation of Activity, Reposition, Distraction  Pain Rating Post-Intervention 1: 2/10    Objective:   Patient found with: peripheral IV, cervical collar, hemovac, SUPINE IN BED. ASSISTED PATIENT FOR GT INTO HALLWAY WITH RWM THEN TO B/S CHAIR , PATIENT INSTRUCTED WITH GEORGIE LE EXERCISES SHORTSEATED.    Functional Mobility:  Bed Mobility:    SUPINE TO SIT AT CGAX1    Transfers:   SIT TO STAND, STAND TO SIT AT CGAX1.    Gait:    AMBULATE 100'X2, GOOD STEADY PACE, WITH RW AT CGAX1, X1 STANDING REST PERIOD.      Balance:   Static Sit: GOOD-: Takes MODERATE challenges from all directions but inconsistently  Dynamic Sit: GOOD: Maintains balance through MODERATE excursions of active trunk movement  Static Stand: FAIR+: Takes MINIMAL challenges from all directions  Dynamic stand: FAIR: Needs CONTACT GUARD during gait     Therapeutic Activities and Exercises:  GEORGIE LE EXERCISES, OOB T/FS, GT INTO HALLWAY    AM-PAC 6 CLICK MOBILITY  How much help from another person does this patient currently need?   1 = Unable, Total/Dependent Assistance  2 = A lot, Maximum/Moderate Assistance  3 = A little, Minimum/Contact  Guard/Supervision  4 = None, Modified [place]/Independent    Turning over in bed (including adjusting bedclothes, sheets and blankets)?: 3  Sitting down on and standing up from a chair with arms (e.g., wheelchair, bedside commode, etc.): 3  Moving from lying on back to sitting on the side of the bed?: 3  Moving to and from a bed to a chair (including a wheelchair)?: 3  Need to walk in hospital room?: 3  Climbing 3-5 steps with a railing?: 1  Basic Mobility Total Score: 16    AM-PAC Raw Score CMS G-Code Modifier Level of Impairment Assistance   6 % Total / Unable   7 - 9 CM 80 - 100% Maximal Assist   10 - 14 CL 60 - 80% Moderate Assist   15 - 19 CK 40 - 60% Moderate Assist   20 - 22 CJ 20 - 40% Minimal Assist   23 CI 1-20% SBA / CGA   24 CH 0% Independent/ Mod I     Patient left up in chair with all lines intact and call button in reach.    Assessment:  Brett Nugent is a 68 y.o. male with a medical diagnosis of Postoperative CSF leak . PATIENT WITH INCREASED STEADINESS DURING T/FS AND GT ACTIVITY. PATIENT APPEAR MOTIVATED TO INCREASE ACTIVITY LEVEL. FAMILY PRESENT , SUPPORTIVE WITH PATIENT CARE.    Rehab identified problem list/impairments: Rehab identified problem list/impairments: weakness, impaired self care skills, impaired endurance, pain, decreased upper extremity function, impaired sensation, gait instability    Rehab potential is excellent.    Activity tolerance: Excellent    Discharge recommendations: Discharge Facility/Level of Care Needs: home health PT     Barriers to discharge:      Equipment recommendations: Equipment Needed After Discharge: none     GOALS:   Multidisciplinary Problems     Physical Therapy Goals        Problem: Physical Therapy Goal    Goal Priority Disciplines Outcome Goal Variances Interventions   Physical Therapy Goal     PT, PT/OT Ongoing (interventions implemented as appropriate)     Description:  Pt will be seen for P.T. FOR A MIN OF 5 OUT OF 7 DAYS A WEEK  LTG:  3/22/19  1. PT WILL COMPLETE BED MOBILITY WITH HOB ELEVATED TO 45 DEG AND S  2. PT WILL T/F TO CHAIR WITH RW AND S  3. PT WILL GT TRAIN X 100' WITH RW AND S  4. PT WILL COMPLETE B LE TE X 20 REPS                     PLAN:    Patient to be seen 5 x/week  to address the above listed problems via gait training, therapeutic activities, therapeutic exercises  Plan of Care expires: 03/22/19  Plan of Care reviewed with: patient, family         Ingrid Domitila, PTA  03/17/2019

## 2019-03-18 ENCOUNTER — TELEPHONE (OUTPATIENT)
Dept: NEUROSURGERY | Facility: CLINIC | Age: 69
End: 2019-03-18

## 2019-03-18 NOTE — TELEPHONE ENCOUNTER
Spoke with Vaishnavi, informed her, Dr. Wolfe stated to change bandages every other day with ABD pad, stated she can do that, also informed, per therapist pt did well during PT and vitals were fine//ns     ----- Message from Kathy Davies sent at 3/18/2019  3:21 PM CDT -----  Contact: Vaishnavi from [facility]   States pt still has the original surgical dressing on his wound and wants to know if they are to change the dressing on both wounds or just 1 and can be reached at [phone]//thanks/dbw     States that she's at the pt's house and would like to speak with nurse about it

## 2019-03-18 NOTE — TELEPHONE ENCOUNTER
Called in reference to message below, spoke with Laura informed her we will reschedule appt since pt was recently seen by  Pt is rescheduled for 3/27/19//ns   ----- Message from Kathy Davies sent at 3/18/2019  9:52 AM CDT -----  Contact: Pt wife  Hiwot   States she's calling rg an appt and checking the date / states she wants to know if to keep the appt on 03/20 or wait 2 weeks and can be reached at [phone]//thanks/dbw

## 2019-03-19 ENCOUNTER — PATIENT MESSAGE (OUTPATIENT)
Dept: NEUROSURGERY | Facility: CLINIC | Age: 69
End: 2019-03-19

## 2019-03-27 ENCOUNTER — OFFICE VISIT (OUTPATIENT)
Dept: NEUROSURGERY | Facility: CLINIC | Age: 69
End: 2019-03-27
Payer: MEDICARE

## 2019-03-27 VITALS
DIASTOLIC BLOOD PRESSURE: 82 MMHG | BODY MASS INDEX: 20.54 KG/M2 | HEIGHT: 74 IN | WEIGHT: 160.06 LBS | SYSTOLIC BLOOD PRESSURE: 130 MMHG | HEART RATE: 54 BPM

## 2019-03-27 DIAGNOSIS — Z98.1 STATUS POST CERVICAL SPINAL FUSION: Primary | ICD-10-CM

## 2019-03-27 PROCEDURE — 99999 PR PBB SHADOW E&M-EST. PATIENT-LVL V: CPT | Mod: PBBFAC,,, | Performed by: NEUROLOGICAL SURGERY

## 2019-03-27 PROCEDURE — 99024 POSTOP FOLLOW-UP VISIT: CPT | Mod: S$GLB,,, | Performed by: NEUROLOGICAL SURGERY

## 2019-03-27 PROCEDURE — 99999 PR PBB SHADOW E&M-EST. PATIENT-LVL V: ICD-10-PCS | Mod: PBBFAC,,, | Performed by: NEUROLOGICAL SURGERY

## 2019-03-27 PROCEDURE — 99024 PR POST-OP FOLLOW-UP VISIT: ICD-10-PCS | Mod: S$GLB,,, | Performed by: NEUROLOGICAL SURGERY

## 2019-03-27 NOTE — PROGRESS NOTES
Subjective:      Patient ID: Brett Nugent is a 68 y.o. male.    Chief Complaint: Post-op Evaluation and Cervical Spine Pain (C-spine)      Brett Nugent is a 68 y.o. male who presents for 2 week postoperative visit of CSF Leak . Patient has no complaints today. Pain is well controlled. Sutures in place. No signs of wound infection. Patient presents in Cervical Collar.  Overall patient is doing well.  Arm pain is resolved.  Neck pain is controlled.  Denies any headache nausea vomiting or trouble with balance.  He is pleased with his progress thus far      Review of Systems   Constitutional: Negative for fatigue and unexpected weight change.   HENT: Negative for ear pain, hearing loss, tinnitus and voice change.    Respiratory: Negative for shortness of breath.    Cardiovascular: Negative for chest pain.   Gastrointestinal: Negative for abdominal pain.   Musculoskeletal: Positive for neck pain. Negative for back pain, gait problem, myalgias and neck stiffness.   Skin: Negative for color change.   Neurological: Negative for dizziness, tremors, numbness and headaches.   Psychiatric/Behavioral: Negative for agitation and confusion. The patient is not nervous/anxious.          Objective:   Incision with sutures intact, sutures removed  No signs of wound infection   No redness swelling or drainage from incision  C-collar in place  Moves all 4 extremities  Sensation intact    Assessment:     1. Status post cervical spinal fusion      Plan:     Status post cervical spinal fusion  -     Ambulatory Referral to Physical/Occupational Therapy  -     Cancel: X-Ray Cervical Spine 2 or 3 Views; Future; Expected date: 03/27/2019  -     X-Ray Cervical Spine 2 or 3 Views; Future; Expected date: 04/27/2019      Sutures removed  Continue C-collar  Follow up in 4 wks for 6 wk postop

## 2019-04-10 ENCOUNTER — TELEPHONE (OUTPATIENT)
Dept: ADMINISTRATIVE | Facility: CLINIC | Age: 69
End: 2019-04-10

## 2019-04-10 ENCOUNTER — PATIENT MESSAGE (OUTPATIENT)
Dept: NEUROSURGERY | Facility: CLINIC | Age: 69
End: 2019-04-10

## 2019-04-10 NOTE — TELEPHONE ENCOUNTER
Home Health SOC 03/10/2019 - 05/08/2019 with [facility] ([facility]) - Dr. Helio Wolfe. Patient received SN, PT and OT services.

## 2019-04-12 ENCOUNTER — TELEPHONE (OUTPATIENT)
Dept: NEUROSURGERY | Facility: CLINIC | Age: 69
End: 2019-04-12

## 2019-04-12 DIAGNOSIS — Z98.1 STATUS POST CERVICAL SPINAL FUSION: Primary | ICD-10-CM

## 2019-04-12 NOTE — TELEPHONE ENCOUNTER
Left message in reference to the message below and left call back number//ns ----- Message from Mouna Dubon sent at 4/12/2019  1:04 PM CDT -----  Contact: pt  .Type:  Patient Returning Call    Who Called:pt  Who Left Message for Patient:cornelius  Does the patient know what this is regarding?: return call   Would the patient rather a call back or a response via [facility]? Call back   Best Call Back Number: [phone] ([place])     Additional Information: ...

## 2019-04-23 ENCOUNTER — TELEPHONE (OUTPATIENT)
Dept: NEUROSURGERY | Facility: CLINIC | Age: 69
End: 2019-04-23

## 2019-04-24 ENCOUNTER — OFFICE VISIT (OUTPATIENT)
Dept: NEUROSURGERY | Facility: CLINIC | Age: 69
End: 2019-04-24
Payer: MEDICARE

## 2019-04-24 ENCOUNTER — HOSPITAL ENCOUNTER (OUTPATIENT)
Dept: RADIOLOGY | Facility: HOSPITAL | Age: 69
Discharge: HOME | End: 2019-04-24
Attending: NEUROLOGICAL SURGERY
Payer: MEDICARE

## 2019-04-24 VITALS
BODY MASS INDEX: 20.45 KG/M2 | HEART RATE: 64 BPM | DIASTOLIC BLOOD PRESSURE: 82 MMHG | HEIGHT: 74 IN | WEIGHT: 159.38 LBS | SYSTOLIC BLOOD PRESSURE: 147 MMHG

## 2019-04-24 DIAGNOSIS — Z98.890 POSTOPERATIVE STATE: Primary | ICD-10-CM

## 2019-04-24 DIAGNOSIS — Z98.890 POSTOPERATIVE STATE: ICD-10-CM

## 2019-04-24 DIAGNOSIS — M54.2 NECK PAIN: Primary | ICD-10-CM

## 2019-04-24 DIAGNOSIS — Z98.1 STATUS POST CERVICAL SPINAL FUSION: Primary | ICD-10-CM

## 2019-04-24 DIAGNOSIS — Z98.1 STATUS POST CERVICAL SPINAL FUSION: ICD-10-CM

## 2019-04-24 PROCEDURE — 99999 PR PBB SHADOW E&M-EST. PATIENT-LVL IV: CPT | Mod: PBBFAC,,, | Performed by: PHYSICIAN ASSISTANT

## 2019-04-24 PROCEDURE — 72040 XR CERVICAL SPINE 2 OR 3 VIEWS: ICD-10-PCS | Mod: 26,,, | Performed by: RADIOLOGY

## 2019-04-24 PROCEDURE — 99999 PR PBB SHADOW E&M-EST. PATIENT-LVL IV: ICD-10-PCS | Mod: PBBFAC,,, | Performed by: PHYSICIAN ASSISTANT

## 2019-04-24 PROCEDURE — 99024 PR POST-OP FOLLOW-UP VISIT: ICD-10-PCS | Mod: S$GLB,,, | Performed by: PHYSICIAN ASSISTANT

## 2019-04-24 PROCEDURE — 72040 X-RAY EXAM NECK SPINE 2-3 VW: CPT | Mod: 26,,, | Performed by: RADIOLOGY

## 2019-04-24 PROCEDURE — 72040 X-RAY EXAM NECK SPINE 2-3 VW: CPT | Mod: TC

## 2019-04-24 PROCEDURE — 99024 POSTOP FOLLOW-UP VISIT: CPT | Mod: S$GLB,,, | Performed by: PHYSICIAN ASSISTANT

## 2019-04-24 RX ORDER — POLYETHYLENE GLYCOL 3350 17 G/17G
POWDER, FOR SOLUTION ORAL DAILY PRN
COMMUNITY
End: 2019-05-28

## 2019-04-29 NOTE — PROGRESS NOTES
Subjective:      Patient ID: Brett Nugent is a 68 y.o. male.    Chief Complaint: Cervical Spine Pain (C-spine)    Patient is here today for a follow-up of a hospital evaluation for several weeks of progressive pain numbness and tingling in the back and weakness in the upper extremities.  He was worked up in the hospital and was found to have severe cervical stenosis.  On examination he had weakness in the upper extremities with the right side being worse than the left.  He was also having difficulty with balance and ambulation problems.  We discussed possible treatment options and any is here back in to my office for further evaluation.  He has had difficulty with dexterity in the upper extremities particularly with fine motor tasks.  Numbness down the right-sided upper extremity.  Ambulates unassisted  Denies any bowel bladder symptoms  No previous cervical surgery    CT cervical  1. There is grade 1 anterolisthesis of C4 on C5.  2. There are mild degenerative changes between C5 and T1.  3. There is mild partial opacification of the right mastoid air cells.    MRI cervical spine  Multilevel advanced degenerative disc disease, spondylosis, and facet arthropathy.  Grade 1 spondylolisthesis with 4 mm anterolisthesis C4 on C5.  Multilevel ligamentum flavum buckling.  There is subsequent multilevel advanced central spinal canal stenosis at C3-4 (7 mm) and more notably C4-5 (5-6 mm) with subsequent effacement of the CSF signal with mild flattening deformity of the cervical spinal cord.  However, there is no abnormal spinal cord signal.  There is also multilevel scattered moderate to advanced neural foraminal stenosis, detailed above.  There is straightening of the of the lower cervical spine.  Degenerative sub endplate marrow edema posterior C4-5.  Negative for occult cervical spine fracture.      Review of Systems   Constitutional: Negative for activity change, appetite change and chills.   HENT: Negative for  congestion and ear pain.    Eyes: Negative for photophobia, redness and visual disturbance.   Respiratory: Negative for apnea and chest tightness.    Cardiovascular: Negative for chest pain.   Gastrointestinal: Negative for abdominal distention and abdominal pain.   Endocrine: Negative for cold intolerance.   Genitourinary: Negative for difficulty urinating.   Musculoskeletal: Positive for myalgias, neck pain and neck stiffness. Negative for arthralgias.   Skin: Negative for color change.   Allergic/Immunologic: Negative for environmental allergies.   Neurological: Positive for weakness and numbness. Negative for dizziness.   Hematological: Negative for adenopathy. Does not bruise/bleed easily.   Psychiatric/Behavioral: Negative for agitation, behavioral problems and confusion.         Objective:       Physical Exam:  Nursing note and vitals reviewed.    Constitutional: He appears well-developed and well-nourished. No distress.     Eyes: Pupils are equal, round, and reactive to light. EOM are normal.     Cardiovascular: Intact distal pulses.     Abdominal: Soft.     Psych/Behavior: He is alert. He is oriented to person, place, and time. He has a normal mood and affect.     Neurological:        DTRs: Tricep reflexes are 2+ on the right side and 2+ on the left side. Bicep reflexes are 2+ on the right side and 2+ on the left side. Brachioradialis reflexes are 2+ on the right side and 2+ on the left side. Achilles reflexes are 2+ on the right side and 2+ on the left side.     General    Nursing note and vitals reviewed.  Constitutional: He is oriented to person, place, and time. He appears well-developed and well-nourished. No distress.   HENT:   Head: Normocephalic and atraumatic.   Nose: Nose normal.   Eyes: EOM are normal. Pupils are equal, round, and reactive to light.   Neck: Normal range of motion. Neck supple.   Cardiovascular: Intact distal pulses.    Pulmonary/Chest: Effort normal. No respiratory distress.    Abdominal: Soft. He exhibits no distension.   Neurological: He is alert and oriented to person, place, and time. No cranial nerve deficit.   Psychiatric: He has a normal mood and affect. His behavior is normal.         Right Ankle/Foot Exam     Tests   Tiptoe Walk: able to perform  Single Heel Rise: able to perform  Double Heel Rise: unable to perform double heel rise    Left Ankle/Foot Exam     Tests   Heel Walk: able to perform  Tiptoe Walk: able to perform  Double Heel Rise: able to perform  Back (L-Spine & T-Spine) / Neck (C-Spine) Exam     Tenderness Posterior midline palpation reveals tenderness of the Upper C-Spine. Right paramedian tenderness of the Upper C-Spine. Left paramedian tenderness of the Upper C-Spine.     Back (L-Spine & T-Spine) Range of Motion   Lateral bend right: normal   Lateral bend left: normal     Neck (C-Spine) Range of Motion   Flexion:     Limited and mild  Extension: Limited and mild  Right Lateral Bend: normal  Left Lateral Bend: normal  Right Rotation: normal  Left Rotation: normal    Spinal Sensation   Right Side Sensation  C-Spine Level: decreased   L-Spine Level: normal  Left Side Sensation  C-Spine Level: decreased  L-Spine Level: normal    Back (L-Spine & T-Spine) Tests   Left Side Tests  Squat: able to perform    Neck (C-Spine) Tests   Spurling's Test   Left:  Negative  Right: negative    Comments:  Patient had weakness bilaterally with presence of myelopathy and hyperreflexia bilaterally      Muscle Strength   Right Upper Extremity   Biceps: 5/5/5   Deltoid:  5/5  Triceps:  5/5  Wrist extension: 4/5/5   Wrist flexion: 4/5/5   Finger Flexors:  4/5  Finger Extensors:  4/5  Left Upper Extremity  Biceps: 5/5/5   Deltoid:  5/5  Triceps:  5/5  Wrist extension: 4/5/5   Wrist flexion: 4/5/5   Finger Flexors:  4/5  Finger Extensors:  5/5  Right Lower Extremity   Hip Abduction: 5/5   Hip Flexion: 5/5   Hip Extensors: 5/5  Quadriceps:  5/5   Hamstrin/5   Anterior tibial:     Gastrocsoleus:    EHL:    Left Lower Extremity   Hip Abduction:    Hip Flexion:    Hip Extensors:   Quadriceps:     Hamstrin/5   Anterior tibial:     Gastrocsoleus:    EHL:      Reflexes     Left Side  Biceps:  2+  Triceps:  2+  Brachioradialis:  2+  Quadriceps:  2+  Post Tibial:  2+  Achilles:  2+  Left Le's Sign:  present    Right Side   Biceps:  2+  Triceps:  2+  Brachioradialis:  2+  Quadriceps:  2+  Post Tibial:  2+  Achilles:  2+  Right Le's Sign:  present      Assessment:     1. Stenosis of cervical spine with myelopathy      Plan:     Stenosis of cervical spine with myelopathy     We have recommended surgical procedure  Posterior then anterior cervical decompression with stabilization 2 stage operation  All questions are answered    The patient was informed of all benefits and potential risk of the operation including but not limited to:  Pain, infection, bleeding, coma, paralysis, death.  Cerebrospinal fluid leak, failure of any instrumentation, the need for additional procedures in the future. No guarantee was given that this procedure would alleviate all of the symptoms.    Thank you for the referral   Please call with any questions    Helio Wolfe MD  Neurosurgery

## 2019-04-29 NOTE — PROGRESS NOTES
Subjective:      Patient ID: Brett Nugent is a 68 y.o. male.    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 Primary repair of csf leak requiring laminectomy      Description of the Findings of the Procedure: csf leak        Brett Nugent is a 68 y.o. male who presents for 6 week postoperative visit of a CSF leak repair and 7 week postoperative visit of posterior cervical fusion C3 -5. Patient has no complaints today. Pain is well controlled.  Incision well healed. No signs of wound infection.  No drainage. Patient does complain of some the needles and numbness to posterior neck, however he is not having any symptoms to his arms.  The patient does not complain of any headaches his symptoms are very much improved today. Patient has been wearing a soft cervical collar as instructed.  Patient is going to PT at [facility] physical [facility]      Review of Systems   Constitutional: Negative for fatigue and unexpected weight change.   HENT: Negative for ear pain, hearing loss, tinnitus and voice change.    Respiratory: Negative for shortness of breath.    Cardiovascular: Negative for chest pain.   Gastrointestinal: Negative for abdominal pain.   Musculoskeletal: Negative for back pain, gait problem, myalgias, neck pain and neck stiffness.   Skin: Negative for color change.    Neurological: Negative for dizziness, tremors, numbness and headaches.   Psychiatric/Behavioral: Negative for agitation and confusion. The patient is not nervous/anxious.          Objective:     Incision healed well  No signs of drainage  Soft cervical collar in place today  Moves all 4 extremities  Sensation intact    I have personally reviewed the following imaging and agree with the radiologist's findings of:   XR Cervical: Postoperative changes related to prior posterior fusion of C3 through C5 again noted with associated posterior decompression.  Hardware appears unchanged without definite evidence of complication.  Reversal of the normal cervical lordosis is again noted and unchanged.  No definite spondylolisthesis demonstrated.  Severe disc height loss again noted from C5-6 through C7-T1 with moderate disc height loss at C3-4 and C4-5.  No appreciable change from prior.  No fractures visualized.    Assessment:     1. Status post cervical spinal fusion    2. Postoperative state      Plan:     Status post cervical spinal fusion    Postoperative state      Patient doing well  Continue soft cervical collar  Continue PT  Can resume baby ASA  Follow-up in 5 weeks with x-rays cervical AP/Lat/flex/extent for three-month postop visit of Cervical Fusion    Patient verbalizes understanding and agrees with the above plan.    Blakeney McKnight, PA-C [facility]

## 2019-05-21 ENCOUNTER — TELEPHONE (OUTPATIENT)
Dept: NEUROSURGERY | Facility: CLINIC | Age: 69
End: 2019-05-21

## 2019-05-22 ENCOUNTER — PATIENT MESSAGE (OUTPATIENT)
Dept: RHEUMATOLOGY | Facility: CLINIC | Age: 69
End: 2019-05-22

## 2019-05-22 NOTE — TELEPHONE ENCOUNTER
Communicated with patient in regards to intermittent jaw pain.  I last him on November, history of severe osteoporosis with chronic bisphosphonate use.  Still at high fragility risk based on his last DEXA results, recommend to continue bone antiresorptive therapy with Prolia, which he received on 11/2018.  He informs me he has been having several months of episodic lower jaw pain.  Unrelated to mastication, not associated with any surgical procedures or trauma to any tooth.  Does not have any ulcers or drainage that he can tell.  He is scheduled for an upcoming dose of Prolia.  Will hold next upcoming dose until evaluation by his dentist for osteonecrosis of the jaw.

## 2019-05-23 ENCOUNTER — PATIENT MESSAGE (OUTPATIENT)
Dept: RHEUMATOLOGY | Facility: CLINIC | Age: 69
End: 2019-05-23

## 2019-05-23 DIAGNOSIS — M81.0 AGE-RELATED OSTEOPOROSIS WITHOUT CURRENT PATHOLOGICAL FRACTURE: Primary | ICD-10-CM

## 2019-05-28 ENCOUNTER — HOSPITAL ENCOUNTER (OUTPATIENT)
Dept: RADIOLOGY | Facility: HOSPITAL | Age: 69
Discharge: HOME | End: 2019-05-28
Attending: PHYSICIAN ASSISTANT
Payer: MEDICARE

## 2019-05-28 ENCOUNTER — OFFICE VISIT (OUTPATIENT)
Dept: NEUROSURGERY | Facility: CLINIC | Age: 69
End: 2019-05-28
Payer: MEDICARE

## 2019-05-28 ENCOUNTER — TELEPHONE (OUTPATIENT)
Dept: NEUROSURGERY | Facility: CLINIC | Age: 69
End: 2019-05-28

## 2019-05-28 VITALS
DIASTOLIC BLOOD PRESSURE: 78 MMHG | HEIGHT: 74 IN | WEIGHT: 160 LBS | BODY MASS INDEX: 20.53 KG/M2 | HEART RATE: 61 BPM | SYSTOLIC BLOOD PRESSURE: 139 MMHG

## 2019-05-28 DIAGNOSIS — M62.838 MUSCLE SPASMS OF NECK: ICD-10-CM

## 2019-05-28 DIAGNOSIS — Z98.1 STATUS POST CERVICAL SPINAL FUSION: Primary | ICD-10-CM

## 2019-05-28 DIAGNOSIS — M54.2 NECK PAIN: ICD-10-CM

## 2019-05-28 PROCEDURE — 99999 PR PBB SHADOW E&M-EST. PATIENT-LVL IV: CPT | Mod: PBBFAC,,, | Performed by: PHYSICIAN ASSISTANT

## 2019-05-28 PROCEDURE — 99024 POSTOP FOLLOW-UP VISIT: CPT | Mod: S$GLB,,, | Performed by: PHYSICIAN ASSISTANT

## 2019-05-28 PROCEDURE — 72050 X-RAY EXAM NECK SPINE 4/5VWS: CPT | Mod: TC

## 2019-05-28 PROCEDURE — 72050 X-RAY EXAM NECK SPINE 4/5VWS: CPT | Mod: 26,,, | Performed by: RADIOLOGY

## 2019-05-28 PROCEDURE — 99999 PR PBB SHADOW E&M-EST. PATIENT-LVL IV: ICD-10-PCS | Mod: PBBFAC,,, | Performed by: PHYSICIAN ASSISTANT

## 2019-05-28 PROCEDURE — 72050 XR CERVICAL SPINE AP LAT WITH FLEX EXTEN: ICD-10-PCS | Mod: 26,,, | Performed by: RADIOLOGY

## 2019-05-28 PROCEDURE — 99024 PR POST-OP FOLLOW-UP VISIT: ICD-10-PCS | Mod: S$GLB,,, | Performed by: PHYSICIAN ASSISTANT

## 2019-05-29 ENCOUNTER — LAB VISIT (OUTPATIENT)
Dept: LAB | Facility: HOSPITAL | Age: 69
End: 2019-05-29
Payer: MEDICARE

## 2019-05-29 ENCOUNTER — OFFICE VISIT (OUTPATIENT)
Dept: RHEUMATOLOGY | Facility: CLINIC | Age: 69
End: 2019-05-29
Payer: MEDICARE

## 2019-05-29 VITALS
WEIGHT: 161.63 LBS | HEART RATE: 68 BPM | BODY MASS INDEX: 20.74 KG/M2 | DIASTOLIC BLOOD PRESSURE: 80 MMHG | SYSTOLIC BLOOD PRESSURE: 138 MMHG | HEIGHT: 74 IN

## 2019-05-29 DIAGNOSIS — M81.0 AGE-RELATED OSTEOPOROSIS WITHOUT CURRENT PATHOLOGICAL FRACTURE: ICD-10-CM

## 2019-05-29 DIAGNOSIS — M81.0 OSTEOPOROSIS, UNSPECIFIED OSTEOPOROSIS TYPE, UNSPECIFIED PATHOLOGICAL FRACTURE PRESENCE: Primary | ICD-10-CM

## 2019-05-29 DIAGNOSIS — Z71.89 COUNSELING ON HEALTH PROMOTION AND DISEASE PREVENTION: ICD-10-CM

## 2019-05-29 LAB
ALBUMIN SERPL BCP-MCNC: 3.7 G/DL (ref 3.5–5.2)
ALP SERPL-CCNC: 40 U/L (ref 55–135)
ALT SERPL W/O P-5'-P-CCNC: 14 U/L (ref 10–44)
ANION GAP SERPL CALC-SCNC: 8 MMOL/L (ref 8–16)
AST SERPL-CCNC: 14 U/L (ref 10–40)
BILIRUB SERPL-MCNC: 0.3 MG/DL (ref 0.1–1)
BUN SERPL-MCNC: 27 MG/DL (ref 8–23)
CALCIUM SERPL-MCNC: 9.3 MG/DL (ref 8.7–10.5)
CHLORIDE SERPL-SCNC: 108 MMOL/L (ref 95–110)
CO2 SERPL-SCNC: 26 MMOL/L (ref 23–29)
CREAT SERPL-MCNC: 0.9 MG/DL (ref 0.5–1.4)
EST. GFR  (AFRICAN AMERICAN): >60 ML/MIN/1.73 M^2
EST. GFR  (NON AFRICAN AMERICAN): >60 ML/MIN/1.73 M^2
GLUCOSE SERPL-MCNC: 69 MG/DL (ref 70–110)
POTASSIUM SERPL-SCNC: 4.1 MMOL/L (ref 3.5–5.1)
PROT SERPL-MCNC: 6.8 G/DL (ref 6–8.4)
SODIUM SERPL-SCNC: 142 MMOL/L (ref 136–145)

## 2019-05-29 PROCEDURE — 99999 PR PBB SHADOW E&M-EST. PATIENT-LVL III: ICD-10-PCS | Mod: PBBFAC,,, | Performed by: INTERNAL MEDICINE

## 2019-05-29 PROCEDURE — 1101F PR PT FALLS ASSESS DOC 0-1 FALLS W/OUT INJ PAST YR: ICD-10-PCS | Mod: CPTII,S$GLB,, | Performed by: INTERNAL MEDICINE

## 2019-05-29 PROCEDURE — 80053 COMPREHEN METABOLIC PANEL: CPT

## 2019-05-29 PROCEDURE — 99214 PR OFFICE/OUTPT VISIT, EST, LEVL IV, 30-39 MIN: ICD-10-PCS | Mod: 25,S$GLB,, | Performed by: INTERNAL MEDICINE

## 2019-05-29 PROCEDURE — 96372 PR INJECTION,THERAP/PROPH/DIAG2ST, IM OR SUBCUT: ICD-10-PCS | Mod: S$GLB,,, | Performed by: INTERNAL MEDICINE

## 2019-05-29 PROCEDURE — 1101F PT FALLS ASSESS-DOCD LE1/YR: CPT | Mod: CPTII,S$GLB,, | Performed by: INTERNAL MEDICINE

## 2019-05-29 PROCEDURE — 96372 THER/PROPH/DIAG INJ SC/IM: CPT | Mod: S$GLB,,, | Performed by: INTERNAL MEDICINE

## 2019-05-29 PROCEDURE — 99214 OFFICE O/P EST MOD 30 MIN: CPT | Mod: 25,S$GLB,, | Performed by: INTERNAL MEDICINE

## 2019-05-29 PROCEDURE — 99999 PR PBB SHADOW E&M-EST. PATIENT-LVL III: CPT | Mod: PBBFAC,,, | Performed by: INTERNAL MEDICINE

## 2019-05-29 PROCEDURE — 36415 COLL VENOUS BLD VENIPUNCTURE: CPT

## 2019-05-29 NOTE — PATIENT INSTRUCTIONS
Preventing Osteoporosis: Meeting Your Calcium Needs    Your body needs calcium to build and repair bones. But it can't make calcium on its own. That's why it's important to eat calcium-rich foods. Some foods are naturally rich in calcium. Others have calcium added (fortified). It's best to get calcium from the foods you eat. But if you can't get enough, you may want to take calcium supplements. To meet your daily calcium needs, try the foods listed below.  Dairy Fish & beans Other sources      Source   Calcium (mg) per serving   Source   Calcium (mg) per serving   Source   Calcium (mg) per serving      Low-fat yogurt, plain   415 mg/8 oz.   Sardines, Atlantic, canned, with bones   351 mg/3 oz.   Oatmeal, instant, fortified   215 mg/1 cup   Nonfat milk   302 mg/1 cup   Thompson, sockeye, canned, with bones   239 mg/3 oz.   Tofu made with calcium sulfate   204 mg/3 oz.   Low-fat milk   297 mg/1 cup   Soybeans, fresh, boiled   131 mg/1/2 cup   Collards   179 mg/1/2 cup   Swiss cheese   272 mg/1 oz.   White beans, cooked   81 mg/1/2 cup   English muffin, whole wheat   175 mg/1 muffin   Cheddar cheese   205 mg/1 oz.   Navy beans, cooked   79 mg/1/2 cup   Kale   90 mg/1/2 cup   Ice cream strawberry   79 mg/1/2 cup           Orange, navel   56 mg/1 medium   Note: Calcium levels may vary depending on brand and size.  Daily calcium needs  14-18 years old: 1,300 mg  19-30 years old: 1,000 mg  31-50 years old: 1,000 mg  51-70 years old, women: 1,200 mg  51-70 years old, men: 1,000 mg  Pregnant or nursin-28 years old: 1,300 mg, 19-50 years old: 1,000 mg  Older than 70 (women and men): 1,200 mg   Date Last Reviewed: 10/17/2015  © 8491-9160 [employer]. [address] [place] [address], [place] [address]. All rights reserved. This information is not intended as a substitute for professional medical care. Always follow your healthcare professional's instructions.        Living with Osteoporosis: Preventing Fractures  If  you have osteoporosis, you can do a lot to reduce its effect on your life. Knowing how to prevent fractures and spinal curvature can help you live more comfortably and safely with this disease.    Reducing your risk for fractures  The most common fracture sites in people with osteoporosis are the wrist, spine, and hip. These fractures are often caused by accidents and falls. All fractures are painful and may limit what you can do. But hip fractures are very serious. They need surgery, and it can take months to recover. To reduce your risk for fractures:  · Get regular exercise. Try walking, swimming, or weight training.  · Eat foods that are rich in calcium, or take calcium supplements.  · Make your home safe to help avoid accidents.  · Take extra precautions not to fall in risky areas, such as icy sidewalks.  Understanding spinal fractures  Your spine is made up of many bones called vertebrae. Osteoporosis can cause the vertebrae in your spine to collapse. As a result, your upper back may arch forward, creating a curvature. Spine fractures may also result from back strain and bad posture. You will also lose height. Your lower spine must then adjust to keep your body balanced. This can cause back pain. To prevent or lessen these spinal changes:  · Practice good posture.  · Use proper techniques if you need to lift heavy objects.  · Do back exercises to help your posture.  · Lie on your back when you have pain.  · Ask your healthcare provider about these and other ways to help your spine.  Date Last Reviewed: 10/17/2015  © 8327-1977 [employer]. [address] [place] [address], [place] [id number]. All rights reserved. This information is not intended as a substitute for professional medical care. Always follow your healthcare professional's instructions.        Preventing Osteoporosis: Avoiding Bone Loss  Certain factors can speed up bone loss or decrease bone growth. For example, alcohol, cigarettes, and certain  medicines reduce bone mass. Some foods make it hard for your body to absorb calcium.    Things to avoid  Here are things to avoid to help prevent osteoporosis:  · Alcohol is toxic to bones. It is a major cause of bone loss. Heavy drinking can cause osteoporosis even if you have no other risk factors.  · Smoking reduces bone mass. Smoking may also interfere with estrogen levels and cause early menopause.  · Inactivity makes your bones lose strength and become thinner. Over time, thin bones may break. Women who aren't active are at a high risk for osteoporosis.  · Certain medicines, such as cortisone, increase bone loss. They also decrease bone growth. Ask your healthcare provider about any side effects of your medicines, and how to prevent them.  · Protein-rich or salty foods eaten in large amounts may deplete calcium.  · Caffeine increases calcium loss. People who drink a lot of coffee, tea, or valorie lose more calcium than those who don't.  Date Last Reviewed: 10/17/2015  © 8672-4710 [employer]. [address] [place] [address], [place] [address]. All rights reserved. This information is not intended as a substitute for professional medical care. Always follow your healthcare professional's instructions.

## 2019-05-29 NOTE — PROGRESS NOTES
RHEUMATOLOGY OUTPATIENT CLINIC NOTE    5/29/2019    Attending Rheumatologist: Nicholas Brunson  Primary Care Provider: Leonardo Khan MD   Physician Requesting Consultation: Nicholas Brunson MD  [address] [place] [address] [place] [address]  Chief Complaint/Reason For Consultation:  Disease Management      Subjective:       HPI  Brett Nugent is a 68 y.o. White male with osteoporosis comes for follow-up.    Today  Last seen on November.  Recommended to continue bar antiresorptive therapy with Prolia.  Patient was complaining for several months of jaw pain.  Recently evaluated by his dentist, no features of osteonecrosis.  Pain reported to be due to impacted food in a gap between two teeth.  Status post mechanical fall earlier this year, currently undergoing cardiac workup.  He is also status post cervical decompression with stabilization for CS DDD.  No complaints today.  No episodes of frequent falls.  Denies any joint pain,  or GI complaints.    Review of Systems   Constitutional: Negative for fever and weight loss.   HENT:        Denies active jaw pain.   Respiratory: Negative for cough and shortness of breath.    Cardiovascular: Negative for chest pain and leg swelling.   Gastrointestinal: Negative for abdominal pain and blood in stool.   Genitourinary: Negative for dysuria and urgency.   Musculoskeletal: Negative for falls and joint pain.   Skin: Negative for rash.   Neurological: Negative for tingling, focal weakness and weakness.   Psychiatric/Behavioral: Negative for substance abuse.     Chronic comorbid conditions affecting medical decision making today:  Past Medical History:   Diagnosis Date    Anxiety     Enlarged prostate     General anesthetics causing adverse effect in therapeutic use     Slow to wake    GERD (gastroesophageal reflux disease)     [place]'aq (hard of hearing)     has hearing aids    Hyperlipidemia     Osteoporosis     White coat syndrome without diagnosis of hypertension       Past Surgical History:   Procedure Laterality Date    FUSION, SPINE, POSTERIOR SPINAL COLUMN, CERVICAL, USING COMPUTER-ASSISTED NAVIGATION Bilateral 3/4/2019    Performed by [facility]fe [facility] at [facility] OR    PROSTATE SURGERY      REPAIR, CSF LEAK, SPINE Bilateral 3/14/2019    Performed by [facility] at [facility] OR    REVISION-WOUND  Bilateral 3/14/2019    Performed by [facility] at [facility] OR    SINUS SURGERY      SPINE SURGERY       History reviewed. No pertinent family history.  Social History     Substance and Sexual Activity   Alcohol Use No     Social History     Tobacco Use   Smoking Status Never Smoker   Smokeless Tobacco Never Used     Social History     Substance and Sexual Activity   Drug Use No       Current Outpatient Medications:     artificial tears (ISOPTO TEARS) 0.5 % ophthalmic solution, Place 1 drop into both eyes as needed., Disp: , Rfl:     aspirin (ECOTRIN) 81 MG EC tablet, Take 81 mg by mouth once daily., Disp: , Rfl:     bisacodyl (DULCOLAX) 5 mg EC tablet, Take 2 tablets (10 mg total) by mouth 2 (two) times daily., Disp: 20 tablet, Rfl: 0    calcium carbonate (OS-JURGEN) 500 mg calcium (1,250 mg) tablet, Take 1 tablet by mouth once daily., Disp: , Rfl:     cycloSPORINE (RESTASIS) 0.05 % ophthalmic emulsion, Place 1 drop into both eyes 2 (two) times daily., Disp: , Rfl:     esomeprazole (NEXIUM) 40 MG capsule, Take 1 capsule (40 mg total) by mouth once daily., Disp: 30 capsule, Rfl: 11    simvastatin (ZOCOR) 40 MG tablet, Take 1 tablet (40 mg total) by mouth every evening. (Patient taking differently: Take 40 mg by mouth once daily. ), Disp: 30 tablet, Rfl: 11    vitamin D (VITAMIN D3) 1000 units Tab, Take 5,000 Units by mouth once daily. , Disp: , Rfl:     Current Facility-Administered Medications:     denosumab (PROLIA) injection 60 mg, 60 mg, Subcutaneous, Q6 Months, Nicholas Brunson MD, 60 mg at 05/29/19 0907    Facility-Administered Medications Ordered in Other  Visits:     chlorhexidine 0.12 % solution 10 mL, 10 mL, Mouth/Throat, On Call Procedure, Kathy Elizabeth PA-C    lidocaine (PF) 10 mg/ml (1%) injection 10 mg, 1 mL, Intradermal, Once, Homer Johnson DO    nozaseptin (NOZIN) nasal , , Each Nare, On Call Procedure, Kathy Elizabeth PA-C       Objective:         Vitals:    05/29/19 0822   BP: 138/80   Pulse: 68     Physical Exam   Nursing note and vitals reviewed.  Constitutional: He is oriented to person, place, and time and well-developed, well-nourished, and in no distress. No distress.   HENT:   Head: Normocephalic.   Eyes: Conjunctivae are normal. Pupils are equal, round, and reactive to light.   Neck: Normal range of motion.   Cardiovascular: Normal rate and intact distal pulses.    Pulmonary/Chest: Effort normal. No respiratory distress.   Abdominal: Soft. He exhibits no distension.   Neurological: He is alert and oriented to person, place, and time. Gait normal.   Skin: No rash noted.     Musculoskeletal: He exhibits no edema or deformity.       Reviewed old and all outside pertinent medical records available.    All lab results personally reviewed and interpreted by me.  Lab Results   Component Value Date    WBC 10.63 03/14/2019    HGB 13.4 (L) 03/14/2019    HCT 40.7 03/14/2019    MCV 90 03/14/2019    MCH 29.5 03/14/2019    MCHC 32.9 03/14/2019    RDW 14.3 03/14/2019     03/14/2019    MPV 9.0 (L) 03/14/2019    NEUTROABS 4.2 10/12/2018       Lab Results   Component Value Date     05/29/2019    K 4.1 05/29/2019     05/29/2019    CO2 26 05/29/2019    GLU 69 (L) 05/29/2019    BUN 27 (H) 05/29/2019    CALCIUM 9.3 05/29/2019    PROT 6.8 05/29/2019    ALBUMIN 3.7 05/29/2019    BILITOT 0.3 05/29/2019    AST 14 05/29/2019    ALKPHOS 40 (L) 05/29/2019    ALT 14 05/29/2019       Lab Results   Component Value Date    COLORU Yellow 03/04/2019    APPEARANCEUA Clear 03/04/2019    SPECGRAV 1.025 03/04/2019    PHUR 5.0 03/04/2019     PROTEINUA Negative 03/04/2019    KETONESU Negative 03/04/2019    LEUKOCYTESUR Negative 03/04/2019    NITRITE Negative 03/04/2019    UROBILINOGEN Negative 03/04/2019       No results found for: CRP    No results found for: SEDRATE, ERYTHROCYTES    No results found for: JAYLIN, RF, SEDRATE    No components found for: 25OHVITDTOT, 22JXZPVM6, 15EXFKFV4, METHODNOTE    No results found for: URICACID    No components found for: TSPOTTB    Rheum Labs:  n/a     Infectious Labs:  HCV: NR     Imaging:  All imaging reviewed and independently  interpreted by me.    DEXA scan  April 2014 October 2018  FN: -2.4 -2.6  LS: -1.6 -4.5     ASSESSMENT / PLAN:     Brett Nugent is a 68 y.o. White male with:    1. Osteoporosis, unspecified osteoporosis type, unspecified pathological fracture presence  - previously on Fosamax.  Prolia started on August 2019  - recommend to continue with Prolia q/6months, will receive dose #2 today  - clinical significance side effects of therapy discussed in detail.  - continue with calcium vitamin-D supplementation  - Comprehensive metabolic panel; Future    2. Other specified counseling  - over 10 min spent on the following topics  - Limitation of alcohol consumption.  - Regular exercise:  Aerobic and resistance.  - Medication counseling provided.   - Avoid immobility, fall prevention      Follow up in about 6 months (around 11/29/2019).    Method of contact with patient concerns: [facility]    Time spent: 25 minutes in face to face discussion concerning diagnosis, prognosis, review of lab and test results, benefits of treatment as well as management of disease, counseling of patient and coordination of care between various health care providers.  Greater than half the time spent was used for coordination of care and counseling of patient.    Nicholas Brunson M.D.  Rheumatology Department   [facility]

## 2019-05-29 NOTE — PROGRESS NOTES
Ok per Dr. Brunson to administer prior to getting lab results. Administered 1 CC of Prolia 60 mg/cc to abdomen.  Patient tolerated well. No acute reaction noted to site.  Patient instructed on S/S to report.  Patient verbalized understanding.  Patient waited 15 minutes post administration.  Patient is scheduled to return 12/04/2019 for next injection.  Remind me sent to LM to review for next dose.     Lot: [id number]    Exp: 06/21  Manu: Darlene

## 2019-06-15 NOTE — PROGRESS NOTES
Subjective:      Patient ID: Brett Nugent is a 68 y.o. male.    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 Primary repair of csf leak requiring laminectomy      Description of the Findings of the Procedure: csf leak        Brett Nugent is a 68 y.o. male who presents for 3 month posterior cervical fusion C3 -5. Patient has no complaints today. Pain is well controlled.  Incision well healed. No signs of wound infection.  No drainage. Patient has been wearing a soft cervical collar as instructed.  Patient is going to PT at [facility]      Review of Systems   Constitutional: Negative for fatigue and unexpected weight change.   HENT: Negative for ear pain, hearing loss, tinnitus and voice change.    Respiratory: Negative for shortness of breath.    Cardiovascular: Negative for chest pain.   Gastrointestinal: Negative for abdominal pain.   Musculoskeletal: Negative for back pain, gait problem, myalgias, neck pain and neck stiffness.   Skin: Negative for color change.   Neurological: Negative for dizziness, tremors, numbness and headaches.   Psychiatric/Behavioral: Negative for agitation and confusion. The patient is not nervous/anxious.          Objective:     Incision healed well  No signs of drainage  Soft cervical collar in place today  Moves all 4  extremities  Sensation intact    I have personally reviewed the following imaging and agree with the radiologist's findings of:   XR Cervical: Stable postoperative change compatible with posterior fusion with decompression extending from C3-C5, similar to the prior exam.  No acute fracture.  There is minimal grade 1 anterolisthesis of C4 on C5.  There is reversal of normal cervical lordosis.  Multilevel discogenic degenerative changes throughout the cervical spine are again seen with findings most pronounced at C3-C4, C4-C5, C5-C6, and C6-C7 levels with intervertebral disc space narrowing, and marginal spurring noted.  No instability is demonstrated on flexion/extension maneuvers.  Prevertebral soft tissues are maintained.  Odontoid is intact.  Lateral masses are symmetric.  Dental hardware is incidentally noted.    Assessment:     1. Status post cervical spinal fusion    2. Muscle spasms of neck      Plan:     Status post cervical spinal fusion  -     Ambulatory Referral to Physical/Occupational Therapy  -     X-Ray Cervical Spine AP Lat with Flex Ex; Future; Expected date: 05/28/2019    Muscle spasms of neck  -     Ambulatory Referral to Physical/Occupational Therapy      Patient doing well  Okay to d/c soft cervical collar and use PRN  Continue PT - Neck ROM/strengthening and Muscle spasms  Follow up in 3 month for 6 month follow up     Patient verbalizes understanding and agrees with the above plan.    Kathy Elizabeth PA-C  [facility]

## 2019-08-10 NOTE — PROGRESS NOTES
"Pt is c/o having a "panic attack." He stated, "I feel like the walls are closing in on me...like when I have a MRI done." VSS. Neurovascular check WNL. PRN xanax given. Encouraged pt to take deep breaths. Assisted pt in ambulating in moser. Per pt's request, left door open with pt in recliner facing towards hallway. Will elena to monitor.   "
[facility]   Neurosurgery  Progress Note    Subjective:     History of Present Illness: No notes on file    Post-Op Info:  Procedure(s) (LRB):  FUSION, SPINE, POSTERIOR SPINAL COLUMN, CERVICAL, USING COMPUTER-ASSISTED NAVIGATION (Bilateral)   3 Days Post-Op   Did better yesterday with PT   Again states R extremity strength improved from before surgery   Cervantes removed yest had to have straight cath x 1 no voiding without difficulty     Moves ext x 4   Incision cdi-wound re-dressed this am. No redness or drainage noted         Assessment/Plan:   Working on home health setup   Will monitor urine- Pt with hx of prostate cancer had urinary retention in the past after surgery   PT  Likely d/c hector with home health       Helio Wolfe MD  Neurosurgery  [facility]   
[facility]  Neurosurgery  Progress Note    Subjective:     History of Present Illness: No notes on file    Post-Op Info:  Procedure(s) (LRB):  FUSION, SPINE, POSTERIOR SPINAL COLUMN, CERVICAL, USING COMPUTER-ASSISTED NAVIGATION (Bilateral)   2 Days Post-Op   Patient is 2 days postop posterior cervical fusion.  Pain is controlled and states that his right hand is stronger than it was preop however the patient continues to have dizziness when sitting up out of bed.  Yesterday was walking 6 ft however he felt unsure of himself and the set down.  Plan was for today to take back for anterior portion of procedure however the patient has a number of complaints.  He tolerated diet yesterday yesterday but he complains of a sinus drainage which has been bothersome for him with the C-collar on.    Patient moves all extremities well  His incision is clean dry and intact  Yesterday evening his drain was discontinued without any difficulty  Patient had no drainage from noon to approximately 4:00 p.m. Yesterday.      Assessment/Plan:     Patient with complaints of dizziness and unsteadiness with ambulation.  We will cancel planned procedure for today.  We will obtain MRI as well as CT scan for further investigation    Helio Wolfe MD  Neurosurgery  [facility]  
[facility]  Neurosurgery  Progress Note    Subjective:     History of Present Illness: No notes on file    Post-Op Info:  Procedure(s) (LRB):  FUSION, SPINE, POSTERIOR SPINAL COLUMN, CERVICAL, USING COMPUTER-ASSISTED NAVIGATION (Bilateral)   3 Days Post-Op   Doing well no complaints this am   Resting comfortably in bed   Ambulated with therapy   No dz, no ha, no nv   mari diet     Moves ext x 4   Incision cdi-wound dressed this am. No redness or drainage noted     Assessment/Plan:   Ok to d/c to home   Will f/u on Monday 3/11 to make ayleen he has no other issues   I have addressed his panic attacks which were treated with xanax   He states he will do much better at home he does not like being in hospital   Home health ordered     Helio Wolfe MD  Neurosurgery  [facility]  
[facility]  Neurosurgery  Progress Note    Subjective:     History of Present Illness: No notes on file    Post-Op Info:  Procedure(s) (LRB):  FUSION, SPINE, POSTERIOR SPINAL COLUMN, CERVICAL, USING COMPUTER-ASSISTED NAVIGATION (Bilateral)   3 Days Post-Op   Pt states he feels much better today   mari diet   No longer complains of Dz   Still has weakness on R which is stable from pre op   R arm feels better     Moves ext x 4   Incision cdi     CT done no hardware abnormality   MR done shows improved spine canal dimensions   No acute abnormality      Assessment/Plan:   D/C staley   Ambulate with therapy   Change dressing today       Helio Wolfe MD  Neurosurgery  [facility]  
[facility]  Neurosurgery  Progress Note    Subjective:     History of Present Illness: Patient complains of Neck and low back pain this AM. Posterior Cervical drain in placed. Patient had 125 mL emptied drainage over night. Bandage C/D/I. C-collar in place.     Post-Op Info:  Procedure(s) (LRB):  FUSION, SPINE, POSTERIOR SPINAL COLUMN, CERVICAL, USING COMPUTER-ASSISTED NAVIGATION (Bilateral)   1 Day Post-Op     Dressing C/D/I  Drain in place  Moving all 4 extremities  Sensation intact    Assessment/Plan:     Continue C-collar  Keep drain in place until 72 hours post op  Pain control   Plan for Anterior C4 Corpectomy tomorrow AM   NPO after midnight    Kathy Elizabeth PA-C  Neurosurgery  [facility]    Agree with above  POD # 1   Will leave drain in place for another day   Pt with lethargy after taking valium will hold for now   NPO after MN  Requested staley stay in place until then he has hx of prostate cancer   
Staley still in place. Discussed removal of staley with pt. Pt would like to keep it in due to surgery possibly being re-scheduled in near future. He stated that he would feel more comfortable with removal of staley after discussing it with Dr. Wolfe.   
Ambulatory

## 2019-08-21 ENCOUNTER — TELEPHONE (OUTPATIENT)
Dept: NEUROSURGERY | Facility: CLINIC | Age: 69
End: 2019-08-21

## 2019-08-21 NOTE — TELEPHONE ENCOUNTER
Called patient left message to return call to discuss further, possibly adjusting time of appt on 8/28/19 or rescheduling appt w/ MD Wolfe

## 2019-08-28 ENCOUNTER — HOSPITAL ENCOUNTER (OUTPATIENT)
Dept: RADIOLOGY | Facility: HOSPITAL | Age: 69
Discharge: HOME | End: 2019-08-28
Attending: PHYSICIAN ASSISTANT
Payer: MEDICARE

## 2019-08-28 ENCOUNTER — OFFICE VISIT (OUTPATIENT)
Dept: NEUROSURGERY | Facility: CLINIC | Age: 69
End: 2019-08-28
Payer: MEDICARE

## 2019-08-28 VITALS
HEART RATE: 55 BPM | BODY MASS INDEX: 20.6 KG/M2 | SYSTOLIC BLOOD PRESSURE: 190 MMHG | HEIGHT: 74 IN | WEIGHT: 160.5 LBS | DIASTOLIC BLOOD PRESSURE: 76 MMHG

## 2019-08-28 DIAGNOSIS — M50.30 DEGENERATIVE DISC DISEASE, CERVICAL: Primary | ICD-10-CM

## 2019-08-28 DIAGNOSIS — Z98.1 STATUS POST CERVICAL SPINAL FUSION: ICD-10-CM

## 2019-08-28 PROBLEM — G99.2 STENOSIS OF CERVICAL SPINE WITH MYELOPATHY: Status: RESOLVED | Noted: 2019-03-09 | Resolved: 2019-08-28

## 2019-08-28 PROBLEM — G96.00 POSTOPERATIVE CSF LEAK: Status: RESOLVED | Noted: 2019-03-14 | Resolved: 2019-08-28

## 2019-08-28 PROBLEM — M48.02 STENOSIS OF CERVICAL SPINE WITH MYELOPATHY: Status: RESOLVED | Noted: 2019-03-09 | Resolved: 2019-08-28

## 2019-08-28 PROBLEM — R53.1 RIGHT SIDED WEAKNESS: Status: RESOLVED | Noted: 2019-02-20 | Resolved: 2019-08-28

## 2019-08-28 PROBLEM — G97.82 POSTOPERATIVE CSF LEAK: Status: RESOLVED | Noted: 2019-03-14 | Resolved: 2019-08-28

## 2019-08-28 PROCEDURE — 99999 PR PBB SHADOW E&M-EST. PATIENT-LVL III: ICD-10-PCS | Mod: PBBFAC,,, | Performed by: NEUROLOGICAL SURGERY

## 2019-08-28 PROCEDURE — 1101F PR PT FALLS ASSESS DOC 0-1 FALLS W/OUT INJ PAST YR: ICD-10-PCS | Mod: CPTII,S$GLB,, | Performed by: NEUROLOGICAL SURGERY

## 2019-08-28 PROCEDURE — 99212 OFFICE O/P EST SF 10 MIN: CPT | Mod: S$GLB,,, | Performed by: NEUROLOGICAL SURGERY

## 2019-08-28 PROCEDURE — 1101F PT FALLS ASSESS-DOCD LE1/YR: CPT | Mod: CPTII,S$GLB,, | Performed by: NEUROLOGICAL SURGERY

## 2019-08-28 PROCEDURE — 99212 PR OFFICE/OUTPT VISIT, EST, LEVL II, 10-19 MIN: ICD-10-PCS | Mod: S$GLB,,, | Performed by: NEUROLOGICAL SURGERY

## 2019-08-28 PROCEDURE — 72050 X-RAY EXAM NECK SPINE 4/5VWS: CPT | Mod: TC

## 2019-08-28 PROCEDURE — 99999 PR PBB SHADOW E&M-EST. PATIENT-LVL III: CPT | Mod: PBBFAC,,, | Performed by: NEUROLOGICAL SURGERY

## 2019-08-28 PROCEDURE — 72050 X-RAY EXAM NECK SPINE 4/5VWS: CPT | Mod: 26,,, | Performed by: RADIOLOGY

## 2019-08-28 PROCEDURE — 72050 XR CERVICAL SPINE AP LAT WITH FLEX EXTEN: ICD-10-PCS | Mod: 26,,, | Performed by: RADIOLOGY

## 2019-08-28 NOTE — PROGRESS NOTES
Subjective:      Patient ID: Brett Nugent is a 69 y.o. male.    Chief Complaint: Follow-up and Cervical Spine Pain (C-spine)    Patient is here today 6 month postop evaluation posterior cervical spine fusion    Since surgery patient states that he has done very well.  He no longer has neck pain.  He also states that his gait and weakness he was having has improved.  He occasionally will have posterior neck pain which he attributes to muscle spasms.  But otherwise he is happy with the results  Ambulates unassisted  Not currently in physical therapy  No other new complaints or issues today    Review of Systems   Constitutional: Negative for fatigue and unexpected weight change.   HENT: Negative for ear pain, hearing loss, tinnitus and voice change.    Respiratory: Negative for shortness of breath.    Cardiovascular: Negative for chest pain.   Gastrointestinal: Negative for abdominal pain.   Musculoskeletal: Negative for back pain, gait problem, myalgias, neck pain and neck stiffness.   Skin: Negative for color change.   Neurological: Negative for dizziness, tremors, numbness and headaches.   Psychiatric/Behavioral: Negative for agitation and confusion. The patient is not nervous/anxious.          Objective:         Nursing note and vitals reviewed  Gen:Oriented to person, place, and time.             Appears stated age   Head:Normocephalic and atraumatic.  Nose: Nose normal.    Eyes: EOM are normal. Pupils are equal, round, and reactive to light.   Neck: Neck supple. No masses or lesions palpated  Cardiovascular: Intact distal pulses.    Abdominal: Soft.   Neurological: Alert and oriented to person, place, and time.  No cranial nerve deficit.  Coordination normal. Normal muscle tone  Psychiatric: Normal mood and affect. Behavior is normal.    Neck:  None  Paraspinal tenderness   None  Paraspinal muscle spasms   None  Pain with flexion and extention   WNL  Range of motion    Neg  Spurling's sign     Motor   Right  Right Left Left  Level Group   5  5  C5 Deltoid   5  5  C6 Bicep   5  5   Wrist extension    5  5  C7 Triceps   5  5   Wrist flexion   5  5  C8    5  5  T1 Interossei    Sensation  NL Decreased (R/L/BL) Level Sensation    X  C5 Lateral upper arm   X  C6 Thumb and index finger, lat forearm   X  C7 Middle finger   X  C8 Ring and little finger   X  T1 Medial arm      Reflex  2+  Bicep tendon   2+  Brachioradialis   2+  Triceps tendon   Not present  Le's   none  Clonus   neg  Tinel's   Incision well healed      I  reviewed all pertinent imaging regarding this case.  X-ray shows posterior instrumentation in place without evidence of pseudoarthrosis  Assessment:     1. Degenerative disc disease, cervical      Plan:     Degenerative disc disease, cervical     Patient is doing well 6 months status post posterior cervical fusion  Continue with activity as tolerates  Will follow up p.r.n.    Thank you for the referral   Please call with any questions    Helio Wolfe MD  Neurosurgery

## 2019-11-26 ENCOUNTER — TELEPHONE (OUTPATIENT)
Dept: RHEUMATOLOGY | Facility: CLINIC | Age: 69
End: 2019-11-26

## 2019-11-26 NOTE — TELEPHONE ENCOUNTER
Called patient regarding appointment on 12.4.19 at 3.30 pm needing to be rescheduled due to Dr. Brunson being out of clinic that day. Rescheduled appointment to 12.11.19 at 3.30 pm. Pt verbalized understanding.

## 2019-11-29 ENCOUNTER — LAB VISIT (OUTPATIENT)
Dept: LAB | Facility: HOSPITAL | Age: 69
End: 2019-11-29
Attending: INTERNAL MEDICINE
Payer: MEDICARE

## 2019-11-29 DIAGNOSIS — M81.0 OSTEOPOROSIS, UNSPECIFIED OSTEOPOROSIS TYPE, UNSPECIFIED PATHOLOGICAL FRACTURE PRESENCE: ICD-10-CM

## 2019-11-29 LAB
25(OH)D3+25(OH)D2 SERPL-MCNC: 60 NG/ML (ref 30–96)
ALBUMIN SERPL BCP-MCNC: 3.6 G/DL (ref 3.5–5.2)
ALP SERPL-CCNC: 40 U/L (ref 55–135)
ALT SERPL W/O P-5'-P-CCNC: 18 U/L (ref 10–44)
ANION GAP SERPL CALC-SCNC: 5 MMOL/L (ref 8–16)
AST SERPL-CCNC: 22 U/L (ref 10–40)
BILIRUB SERPL-MCNC: 0.3 MG/DL (ref 0.1–1)
BUN SERPL-MCNC: 23 MG/DL (ref 8–23)
CALCIUM SERPL-MCNC: 9 MG/DL (ref 8.7–10.5)
CHLORIDE SERPL-SCNC: 108 MMOL/L (ref 95–110)
CO2 SERPL-SCNC: 30 MMOL/L (ref 23–29)
CREAT SERPL-MCNC: 0.9 MG/DL (ref 0.5–1.4)
EST. GFR  (AFRICAN AMERICAN): >60 ML/MIN/1.73 M^2
EST. GFR  (NON AFRICAN AMERICAN): >60 ML/MIN/1.73 M^2
GLUCOSE SERPL-MCNC: 41 MG/DL (ref 70–110)
POTASSIUM SERPL-SCNC: 4.3 MMOL/L (ref 3.5–5.1)
PROT SERPL-MCNC: 6.5 G/DL (ref 6–8.4)
SODIUM SERPL-SCNC: 143 MMOL/L (ref 136–145)

## 2019-11-29 PROCEDURE — 82306 VITAMIN D 25 HYDROXY: CPT

## 2019-11-29 PROCEDURE — 80053 COMPREHEN METABOLIC PANEL: CPT

## 2019-11-29 PROCEDURE — 36415 COLL VENOUS BLD VENIPUNCTURE: CPT | Mod: PO

## 2019-12-11 ENCOUNTER — OFFICE VISIT (OUTPATIENT)
Dept: RHEUMATOLOGY | Facility: CLINIC | Age: 69
End: 2019-12-11
Payer: MEDICARE

## 2019-12-11 VITALS
DIASTOLIC BLOOD PRESSURE: 76 MMHG | WEIGHT: 163.81 LBS | HEART RATE: 54 BPM | BODY MASS INDEX: 21.02 KG/M2 | SYSTOLIC BLOOD PRESSURE: 126 MMHG | HEIGHT: 74 IN

## 2019-12-11 DIAGNOSIS — M81.0 OSTEOPOROSIS, UNSPECIFIED OSTEOPOROSIS TYPE, UNSPECIFIED PATHOLOGICAL FRACTURE PRESENCE: Primary | ICD-10-CM

## 2019-12-11 DIAGNOSIS — Z71.89 COUNSELING ON HEALTH PROMOTION AND DISEASE PREVENTION: ICD-10-CM

## 2019-12-11 PROCEDURE — 1126F AMNT PAIN NOTED NONE PRSNT: CPT | Mod: S$GLB,,, | Performed by: INTERNAL MEDICINE

## 2019-12-11 PROCEDURE — 1101F PR PT FALLS ASSESS DOC 0-1 FALLS W/OUT INJ PAST YR: ICD-10-PCS | Mod: CPTII,S$GLB,, | Performed by: INTERNAL MEDICINE

## 2019-12-11 PROCEDURE — 1159F PR MEDICATION LIST DOCUMENTED IN MEDICAL RECORD: ICD-10-PCS | Mod: S$GLB,,, | Performed by: INTERNAL MEDICINE

## 2019-12-11 PROCEDURE — 96372 THER/PROPH/DIAG INJ SC/IM: CPT | Mod: S$GLB,,, | Performed by: INTERNAL MEDICINE

## 2019-12-11 PROCEDURE — 1101F PT FALLS ASSESS-DOCD LE1/YR: CPT | Mod: CPTII,S$GLB,, | Performed by: INTERNAL MEDICINE

## 2019-12-11 PROCEDURE — 99214 PR OFFICE/OUTPT VISIT, EST, LEVL IV, 30-39 MIN: ICD-10-PCS | Mod: 25,S$GLB,, | Performed by: INTERNAL MEDICINE

## 2019-12-11 PROCEDURE — 3078F DIAST BP <80 MM HG: CPT | Mod: CPTII,S$GLB,, | Performed by: INTERNAL MEDICINE

## 2019-12-11 PROCEDURE — 3078F PR MOST RECENT DIASTOLIC BLOOD PRESSURE < 80 MM HG: ICD-10-PCS | Mod: CPTII,S$GLB,, | Performed by: INTERNAL MEDICINE

## 2019-12-11 PROCEDURE — 99999 PR PBB SHADOW E&M-EST. PATIENT-LVL III: ICD-10-PCS | Mod: PBBFAC,,, | Performed by: INTERNAL MEDICINE

## 2019-12-11 PROCEDURE — 1126F PR PAIN SEVERITY QUANTIFIED, NO PAIN PRESENT: ICD-10-PCS | Mod: S$GLB,,, | Performed by: INTERNAL MEDICINE

## 2019-12-11 PROCEDURE — 3074F SYST BP LT 130 MM HG: CPT | Mod: CPTII,S$GLB,, | Performed by: INTERNAL MEDICINE

## 2019-12-11 PROCEDURE — 1159F MED LIST DOCD IN RCRD: CPT | Mod: S$GLB,,, | Performed by: INTERNAL MEDICINE

## 2019-12-11 PROCEDURE — 99214 OFFICE O/P EST MOD 30 MIN: CPT | Mod: 25,S$GLB,, | Performed by: INTERNAL MEDICINE

## 2019-12-11 PROCEDURE — 96372 PR INJECTION,THERAP/PROPH/DIAG2ST, IM OR SUBCUT: ICD-10-PCS | Mod: S$GLB,,, | Performed by: INTERNAL MEDICINE

## 2019-12-11 PROCEDURE — 99999 PR PBB SHADOW E&M-EST. PATIENT-LVL III: CPT | Mod: PBBFAC,,, | Performed by: INTERNAL MEDICINE

## 2019-12-11 PROCEDURE — 3074F PR MOST RECENT SYSTOLIC BLOOD PRESSURE < 130 MM HG: ICD-10-PCS | Mod: CPTII,S$GLB,, | Performed by: INTERNAL MEDICINE

## 2019-12-11 NOTE — PROGRESS NOTES
RHEUMATOLOGY OUTPATIENT CLINIC NOTE    12/11/2019    Attending Rheumatologist: Nicholas Brunson  Primary Care Provider: Leonardo Khan MD   Physician Requesting Consultation: Nicholas Brunson MD  [address] [place] [address] [place] [address]  Chief Complaint/Reason For Consultation:  Osteoporosis    Subjective:       HPI  Brett Nugent is a 69 y.o. White male with osteoporosis comes for follow-up.    Today  Last seen on May.  Received Prolia without any side effects.  Acute complaints.  Refers 1 episode of mechanical fall since last visit.  He is status post posterior cervical fusion for degenerative disc disease which greatly improved his neck pain, and gait imbalance and weakness.  No episodes of fragility fractures.  Denies any joint pain and no currently planned for any dental procedures.    Addendum: Jaw pain again referred by patient.  On Prolia, last given December 11th ( started on August 2018).  Last time identified to food impacted in between teeth as a etiology of pain.  Recommend for the evaluation and management per patient's dentist to assess for ONJ.  Recommend to repeat DEXA scan and consider bone anabolic therapy if still high risk of fragility fracture.    Addendum March 13th:  Persistent muscle skeletal pain reported by patient, negative workup by dentist for ONJ pain his opinion is that he could be related to the Prolia.  Medication last administered December 11th 2019.  Patient with severe osteoporosis, will recommend Forteo instead.  No history of bone radiation or hypercalcemia.  Will send for PTH level and repeat densitometry test.      Review of Systems   Constitutional: Negative for chills, fever and malaise/fatigue.   Eyes: Negative for pain and redness.   Respiratory: Negative for cough, hemoptysis and shortness of breath.    Cardiovascular: Negative for chest pain and leg swelling.   Gastrointestinal: Negative for abdominal pain, blood in stool and melena.   Genitourinary: Negative  for dysuria and hematuria.   Musculoskeletal: Positive for falls. Negative for joint pain.   Skin: Negative for rash.   Neurological: Negative for tingling and focal weakness.   Psychiatric/Behavioral: Negative for memory loss. The patient does not have insomnia.      Chronic comorbid conditions affecting medical decision making today:  Past Medical History:   Diagnosis Date    Anxiety     Enlarged prostate     General anesthetics causing adverse effect in therapeutic use     Slow to wake    GERD (gastroesophageal reflux disease)     [place] (hard of hearing)     has hearing aids    Hyperlipidemia     Osteoporosis     White coat syndrome without diagnosis of hypertension      Past Surgical History:   Procedure Laterality Date    FUSION OF POSTERIOR COLUMN OF CERVICAL SPINE USING COMPUTER AIDED NAVIGATION Bilateral 3/4/2019    Procedure: FUSION, SPINE, POSTERIOR SPINAL COLUMN, CERVICAL, USING COMPUTER-ASSISTED NAVIGATION;  Surgeon: Helio Wolfe MD;  Location: [facility];  Service: Neurosurgery;  Laterality: Bilateral;  C3-5    PROSTATE SURGERY      REPAIR OF CEREBROSPINAL FLUID LEAK OF SPINE Bilateral 3/14/2019    Procedure: REPAIR, CSF LEAK, SPINE;  Surgeon: Helio Wolfe MD;  Location: [facility] OR;  Service: Neurosurgery;  Laterality: Bilateral;    SINUS SURGERY      SPINE SURGERY       No family history on file.  Social History     Substance and Sexual Activity   Alcohol Use No     Social History     Tobacco Use   Smoking Status Never Smoker   Smokeless Tobacco Never Used     Social History     Substance and Sexual Activity   Drug Use No       Current Outpatient Medications:     aspirin (ECOTRIN) 81 MG EC tablet, Take 81 mg by mouth once daily., Disp: , Rfl:     calcium carbonate (OS-JURGEN) 500 mg calcium (1,250 mg) tablet, Take 1 tablet by mouth once daily., Disp: , Rfl:     cycloSPORINE (RESTASIS) 0.05 % ophthalmic emulsion, Place 1 drop into both eyes 2 (two) times daily., Disp: , Rfl:      "esomeprazole (NEXIUM) 40 MG capsule, TAKE ONE CAPSULE BY MOUTH EVERY DAY, Disp: 30 capsule, Rfl: 11    simvastatin (ZOCOR) 40 MG tablet, Take 1 tablet (40 mg total) by mouth every evening., Disp: 30 tablet, Rfl: 11    vitamin D (VITAMIN D3) 1000 units Tab, Take 5,000 Units by mouth once daily. , Disp: , Rfl:     artificial tears (ISOPTO TEARS) 0.5 % ophthalmic solution, Place 1 drop into both eyes as needed., Disp: , Rfl:     Current Facility-Administered Medications:     denosumab (PROLIA) injection 60 mg, 60 mg, Subcutaneous, Q6 Months, Nicholas Brunson MD, 60 mg at 05/29/19 0907    Facility-Administered Medications Ordered in Other Visits:     chlorhexidine 0.12 % solution 10 mL, 10 mL, Mouth/Throat, On Call Procedure, Kathy Elizabeth PA-C    lidocaine (PF) 10 mg/ml (1%) injection 10 mg, 1 mL, Intradermal, Once, Homer Johnson,     nozaseptin (NOZIN) nasal , , Each Nostril, On Call Procedure, Kathy Elizabeth PA-C       Objective:         Vitals:    12/11/19 1524   BP: 126/76   Pulse: (!) 54     Physical Exam   Constitutional: No distress.   Estimated body mass index is 21.03 kg/m² as calculated from the following:    Height as of this encounter: 6' 2" (1.88 m).    Weight as of this encounter: 74.3 kg (163 lb 12.8 oz).    Wt Readings from Last 1 Encounters:  12/11/19 1524 : 74.3 kg (163 lb 12.8 oz)     HENT:   Head: Normocephalic and atraumatic.   Eyes: Conjunctivae are normal. Pupils are equal, round, and reactive to light.   Neck: Normal range of motion.   Cardiovascular: Normal rate and intact distal pulses.    Pulmonary/Chest: Effort normal. No respiratory distress.   Abdominal: Soft. He exhibits no distension.   Neurological: He is alert. Gait normal.   Skin: No rash noted. No erythema.     Musculoskeletal: Normal range of motion.   : strong  No synovitis        Reviewed old and all outside pertinent medical records available.    All lab results personally reviewed and " interpreted by me.  Lab Results   Component Value Date    WBC 10.63 03/14/2019    HGB 13.4 (L) 03/14/2019    HCT 40.7 03/14/2019    MCV 90 03/14/2019    MCH 29.5 03/14/2019    MCHC 32.9 03/14/2019    RDW 14.3 03/14/2019     03/14/2019    MPV 9.0 (L) 03/14/2019    NEUTROABS 4.2 10/12/2018       Lab Results   Component Value Date     11/29/2019    K 4.3 11/29/2019     11/29/2019    CO2 30 (H) 11/29/2019    GLU 41 (LL) 11/29/2019    BUN 23 11/29/2019    CALCIUM 9.0 11/29/2019    PROT 6.5 11/29/2019    ALBUMIN 3.6 11/29/2019    BILITOT 0.3 11/29/2019    AST 22 11/29/2019    ALKPHOS 40 (L) 11/29/2019    ALT 18 11/29/2019       Lab Results   Component Value Date    COLORU Yellow 03/04/2019    APPEARANCEUA Clear 03/04/2019    SPECGRAV 1.025 03/04/2019    PHUR 5.0 03/04/2019    PROTEINUA Negative 03/04/2019    KETONESU Negative 03/04/2019    LEUKOCYTESUR Negative 03/04/2019    NITRITE Negative 03/04/2019    UROBILINOGEN Negative 03/04/2019       No results found for: CRP    No results found for: SEDRATE, ERYTHROCYTES    No results found for: JAYLIN, RF, SEDRATE    No components found for: 25OHVITDTOT, 37VCATVJ7, 24MDPCOP5, METHODNOTE    No results found for: URICACID    No components found for: TSPOTTB    Rheum Labs:  n/a     Infectious Labs:  HCV: NR     Imaging:  All imaging reviewed and independently  interpreted by me.    DEXA scan  April 2014 October 2018  FN: -2.4 -2.6  LS: -1.6 -4.5     ASSESSMENT / PLAN:     Brett Nugent is a 69 y.o. White male with:    1. Osteoporosis  - previously on Fosamax.  Prolia started on August 2018, due for repeat dose.  - continue every 6 months.  CMP prior next visit.  - will repeat bone densitometry test on October 2020  - avoid immobility, fall prevention.  - adequate dietary calcium and vitamin-D intake  - Comprehensive metabolic panel; Future    2. Other specified counseling  - over 10 min spent on the following topics  - Limitation of alcohol consumption.  -  Regular exercise:  Aerobic and resistance.  - Medication counseling provided.   - Avoid immobility, fall prevention      No follow-ups on file.  RTC 6 months    Method of contact with patient concerns: [facility]    Time spent: 25 minutes in face to face discussion concerning diagnosis, prognosis, review of lab and test results, benefits of treatment as well as management of disease, counseling of patient and coordination of care between various health care providers.  Greater than half the time spent was used for coordination of care and counseling of patient.    Nicholas Brunson M.D.  Rheumatology Department   [facility]

## 2019-12-11 NOTE — PROGRESS NOTES
Allergies and medications reviewed priro to administration. Administered 1cc Prolia 60mg/cc to Right upper quadrant of abdomen. Labs reviewed: Calcium 9.0, Creatinine0.9 . Pt tolerated well. No acute reaction noted to site. Pt instructed on S/S to report. Pt verbalized understanding.     Lot:[id number]  Exp:02/22  Manu:Darlene

## 2020-02-27 ENCOUNTER — PATIENT MESSAGE (OUTPATIENT)
Dept: RHEUMATOLOGY | Facility: CLINIC | Age: 70
End: 2020-02-27

## 2020-03-13 ENCOUNTER — PATIENT MESSAGE (OUTPATIENT)
Dept: RHEUMATOLOGY | Facility: CLINIC | Age: 70
End: 2020-03-13

## 2020-03-13 RX ORDER — TERIPARATIDE 250 UG/ML
20 INJECTION, SOLUTION SUBCUTANEOUS DAILY
Qty: 28.8 ML | Refills: 1 | Status: SHIPPED | OUTPATIENT
Start: 2020-03-13 | End: 2020-11-19 | Stop reason: SDUPTHER

## 2020-03-17 ENCOUNTER — TELEPHONE (OUTPATIENT)
Dept: PHARMACY | Facility: CLINIC | Age: 70
End: 2020-03-17

## 2020-03-17 NOTE — TELEPHONE ENCOUNTER
Informed Patient  that [facility] received prescription for Forteo and prior authorization is required.  [facility] will be back in touch once insurance determination is received.

## 2020-03-20 NOTE — TELEPHONE ENCOUNTER
DOCUMENTATION ONLY:  Prior authorization for  Forteo 600 mcg/2.4 mL #2.4 mL/28 days  approved from 03/20/2020 to 12/31/2020  Case ID: [id number]    Co-pay: $75.00    Patient Assistance IS required. Sending to the financial assistance team to investigate assistance options. Aleks MULLEN

## 2020-04-01 NOTE — TELEPHONE ENCOUNTER
[Patient gave permission to apply for [employer] copay card]  Financial Assistance for [employer] approved with a Co-Pay Card from 04/01/2020 - 12/31/2020    ID: [id number]  BIN: [id number]  PCN: [id number]  GRP: [id number]    Max Amount: $4 Co-Pay per fill with a cap of $9,000/yr in assistance.

## 2020-04-15 NOTE — TELEPHONE ENCOUNTER
Action Required:     I have faxed over Patient Assistance forms to your office. The forms need to be completed by the physician for the patient's Forteo prescription assistance. Please complete the prescription sections of the forms.     Once completed, you can fax them to [facility]. Any questions or concerns regarding the program or completion of the forms, please reach out to Sima PRIDE at [facility].     Thank you,    Sima Walls  [facility] [phone] ([phone])

## 2020-04-30 NOTE — TELEPHONE ENCOUNTER
Patient notified he has been denied financial assistance for Forteo from [employer] [facility] due to being over the income limit. He would like to see if there are any alternatives, but if not he is OK proceeding with $75.00 copay.

## 2020-05-06 ENCOUNTER — TELEPHONE (OUTPATIENT)
Dept: PHARMACY | Facility: CLINIC | Age: 70
End: 2020-05-06

## 2020-05-06 NOTE — TELEPHONE ENCOUNTER
Initial Forteo consult completed on 2020. Forteo will be shipped via [employer] once confirmed with patient. He would like to start after talking to provider at appointment in  or possibly complete first dose at appointment. Pending activities to call and verify shipping for delivery first week of  per patient request. $75.00 copay at 004. First fill will be sent with Sharps, Pen Needles, Alcohol Swabs, and [facility] Welcome Packet. Confirmed 2 patient identifiers - name and . Therapy Appropriate for diagnosis of Osteoporosis [M81.0].    Indication: osteoporosis  Goals of Therapy: slow disease progression, strengthen bones    Counseled patient on storage, administration, and disposal.    Missed Doses: if remembered on same day, dose may be taken. If remembered on the next day, skip dose - never take more than 1 dose in a day.     Patient was counseled on possible side effects: dizziness, joint pain, upset stomach.  Patient advised to administer injection while seated and to watch for symptoms of orthostatic hypotension.    Patient informed of [facility] hours, refill procedures, and on-call pharmacist 24 hours a day. Patient verbalized understanding. Consultation included: the importance of compliance; the importance of laboratory monitoring; and the importance of keeping all follow up appointments. Reviewed importance of Ca + Vit D supplements and weight bearing exercises. Fall risk factors reviewed. All questions answered and addressed to patients satisfaction. [facility] to contact patient to setup initial shipment and then 3 weeks later for refills.     Storage, Administration, and Disposal:  -Keep your FORTEO delivery device in the refrigerator between 36° to 46°F (2° to 8°C).  -Do not use FORTEO after the expiration date printed on the delivery device and packaging. Throw away the -FORTEO delivery device after 28 days even if it has medicine in it   - Wash hands before and after injection.  - Monthly RX will  come with pen needles and alcohol swabs.  - Patient may inject in either the tops of the thighs or abdomen- but at least 2 inches away from belly button.  Patient was instructed to rotate injections sites.  - Patient is to wipe down the injection site with the alcohol pad, wait to dry.    1. Pull off white cap,   2. Screw on new pen needle, remove outer cap.   3. Set dose by pulling out black injection button until it stops - make sure red line shows.  4. Remove inner needle cover.  5. Gently squeeze the area of the cleaned skin and hold it firmly. Insert needle straight into the skin. Push black injection button until it stops - hold and count to 5 to ensure complete dose administered.  6. Pull the needle from skin and confirm dose - black button all the way in confirms full dose administered.  7. Use large needle cover to remove needle safely and recap.  - Patient informed that online website has step-by-step instruction video.     - Patient will use sharps container to discard all injectable items; once full, per [place] law, s/he may lock the sharps container and place in her trash. S/he can then contact the Pharmacy and we will replace the sharps at no additional charge.    Luis Reyes, PharmD  Clinical Pharmacist  [facility]  P: [phone]

## 2020-05-22 ENCOUNTER — PATIENT MESSAGE (OUTPATIENT)
Dept: RHEUMATOLOGY | Facility: CLINIC | Age: 70
End: 2020-05-22

## 2020-05-22 NOTE — TELEPHONE ENCOUNTER
My dentist (Dr. Mikal Matos - [phone]) says I need to have a tooth extracted. He wants your approval since I have been on Prolia for a while and Fosomax before that. Please advise me of whether or not to proceed with the procedure.  Thanks.

## 2020-05-26 NOTE — TELEPHONE ENCOUNTER
Oops - misspelled the medication - I will be starting Forteo in a couple of weeks.          Brett Nugent Manuel, MD 15 hours ago (5:54 PM)          Thank you for the information.   My last Prolia injection was six months ago.   That being true, in your opinion, is it okay for me to continue with the tooth extraction?   I will be beginning [place] in a couple of weeks. I would like to have the extraction before I begin that medication if you believe it is reasonably safe to do so.   Thank you.

## 2020-06-01 ENCOUNTER — TELEPHONE (OUTPATIENT)
Dept: RHEUMATOLOGY | Facility: CLINIC | Age: 70
End: 2020-06-01

## 2020-06-01 NOTE — TELEPHONE ENCOUNTER
Thanks for the info.   Two questions:   1.  Since it has been 6 months since my last injection of Prolia, is it reasonable to proceed with the tooth extraction?   2. The injection scheduled for June 11, is that for Prolia or Forteo? If it is for Prolia, do I need to cancel that appointment (because you are changing to Forteo)?   Please advise.   Thank you!

## 2020-06-03 ENCOUNTER — TELEPHONE (OUTPATIENT)
Dept: INFUSION THERAPY | Facility: HOSPITAL | Age: 70
End: 2020-06-03

## 2020-06-03 NOTE — TELEPHONE ENCOUNTER
Call placed to explain that appointment with Dr. Brunson is still scheduled for 6/11/20 @ 1040 but appointment to receive Prolia on 6/11/20 has been cancelled per Dr. Brunson since his medication was changed to Forteo. Verbalizes understanding.

## 2020-06-05 ENCOUNTER — LAB VISIT (OUTPATIENT)
Dept: LAB | Facility: HOSPITAL | Age: 70
End: 2020-06-05
Attending: INTERNAL MEDICINE
Payer: MEDICARE

## 2020-06-05 DIAGNOSIS — M81.0 OSTEOPOROSIS, UNSPECIFIED OSTEOPOROSIS TYPE, UNSPECIFIED PATHOLOGICAL FRACTURE PRESENCE: ICD-10-CM

## 2020-06-05 PROCEDURE — 80053 COMPREHEN METABOLIC PANEL: CPT

## 2020-06-05 PROCEDURE — 36415 COLL VENOUS BLD VENIPUNCTURE: CPT | Mod: PO

## 2020-06-06 LAB
ALBUMIN SERPL BCP-MCNC: 3.5 G/DL (ref 3.5–5.2)
ALP SERPL-CCNC: 40 U/L (ref 55–135)
ALT SERPL W/O P-5'-P-CCNC: 13 U/L (ref 10–44)
ANION GAP SERPL CALC-SCNC: 7 MMOL/L (ref 8–16)
AST SERPL-CCNC: 19 U/L (ref 10–40)
BILIRUB SERPL-MCNC: 0.5 MG/DL (ref 0.1–1)
BUN SERPL-MCNC: 22 MG/DL (ref 8–23)
CALCIUM SERPL-MCNC: 8.8 MG/DL (ref 8.7–10.5)
CHLORIDE SERPL-SCNC: 109 MMOL/L (ref 95–110)
CO2 SERPL-SCNC: 26 MMOL/L (ref 23–29)
CREAT SERPL-MCNC: 0.9 MG/DL (ref 0.5–1.4)
EST. GFR  (AFRICAN AMERICAN): >60 ML/MIN/1.73 M^2
EST. GFR  (NON AFRICAN AMERICAN): >60 ML/MIN/1.73 M^2
GLUCOSE SERPL-MCNC: 53 MG/DL (ref 70–110)
POTASSIUM SERPL-SCNC: 4 MMOL/L (ref 3.5–5.1)
PROT SERPL-MCNC: 6.7 G/DL (ref 6–8.4)
SODIUM SERPL-SCNC: 142 MMOL/L (ref 136–145)

## 2020-06-11 ENCOUNTER — OFFICE VISIT (OUTPATIENT)
Dept: RHEUMATOLOGY | Facility: CLINIC | Age: 70
End: 2020-06-11
Payer: MEDICARE

## 2020-06-11 VITALS
SYSTOLIC BLOOD PRESSURE: 155 MMHG | WEIGHT: 163.81 LBS | BODY MASS INDEX: 21.02 KG/M2 | DIASTOLIC BLOOD PRESSURE: 79 MMHG | HEART RATE: 54 BPM | HEIGHT: 74 IN

## 2020-06-11 DIAGNOSIS — M81.0 AGE-RELATED OSTEOPOROSIS WITHOUT CURRENT PATHOLOGICAL FRACTURE: Primary | ICD-10-CM

## 2020-06-11 DIAGNOSIS — Z71.89 COUNSELING ON HEALTH PROMOTION AND DISEASE PREVENTION: ICD-10-CM

## 2020-06-11 PROCEDURE — 1159F PR MEDICATION LIST DOCUMENTED IN MEDICAL RECORD: ICD-10-PCS | Mod: S$GLB,,, | Performed by: INTERNAL MEDICINE

## 2020-06-11 PROCEDURE — 1159F MED LIST DOCD IN RCRD: CPT | Mod: S$GLB,,, | Performed by: INTERNAL MEDICINE

## 2020-06-11 PROCEDURE — 1125F AMNT PAIN NOTED PAIN PRSNT: CPT | Mod: S$GLB,,, | Performed by: INTERNAL MEDICINE

## 2020-06-11 PROCEDURE — 3078F PR MOST RECENT DIASTOLIC BLOOD PRESSURE < 80 MM HG: ICD-10-PCS | Mod: CPTII,S$GLB,, | Performed by: INTERNAL MEDICINE

## 2020-06-11 PROCEDURE — 3077F PR MOST RECENT SYSTOLIC BLOOD PRESSURE >= 140 MM HG: ICD-10-PCS | Mod: CPTII,S$GLB,, | Performed by: INTERNAL MEDICINE

## 2020-06-11 PROCEDURE — 3078F DIAST BP <80 MM HG: CPT | Mod: CPTII,S$GLB,, | Performed by: INTERNAL MEDICINE

## 2020-06-11 PROCEDURE — 1101F PT FALLS ASSESS-DOCD LE1/YR: CPT | Mod: CPTII,S$GLB,, | Performed by: INTERNAL MEDICINE

## 2020-06-11 PROCEDURE — 99214 OFFICE O/P EST MOD 30 MIN: CPT | Mod: S$GLB,,, | Performed by: INTERNAL MEDICINE

## 2020-06-11 PROCEDURE — 1125F PR PAIN SEVERITY QUANTIFIED, PAIN PRESENT: ICD-10-PCS | Mod: S$GLB,,, | Performed by: INTERNAL MEDICINE

## 2020-06-11 PROCEDURE — 99999 PR PBB SHADOW E&M-EST. PATIENT-LVL III: CPT | Mod: PBBFAC,,, | Performed by: INTERNAL MEDICINE

## 2020-06-11 PROCEDURE — 99214 PR OFFICE/OUTPT VISIT, EST, LEVL IV, 30-39 MIN: ICD-10-PCS | Mod: S$GLB,,, | Performed by: INTERNAL MEDICINE

## 2020-06-11 PROCEDURE — 1101F PR PT FALLS ASSESS DOC 0-1 FALLS W/OUT INJ PAST YR: ICD-10-PCS | Mod: CPTII,S$GLB,, | Performed by: INTERNAL MEDICINE

## 2020-06-11 PROCEDURE — 99999 PR PBB SHADOW E&M-EST. PATIENT-LVL III: ICD-10-PCS | Mod: PBBFAC,,, | Performed by: INTERNAL MEDICINE

## 2020-06-11 PROCEDURE — 3077F SYST BP >= 140 MM HG: CPT | Mod: CPTII,S$GLB,, | Performed by: INTERNAL MEDICINE

## 2020-06-11 NOTE — PROGRESS NOTES
RHEUMATOLOGY OUTPATIENT CLINIC NOTE    6/11/2020    Attending Rheumatologist: Nicholas Brunson  Primary Care Provider: Leonarod Khan MD   Physician Requesting Consultation: No referring provider defined for this encounter.  Chief Complaint/Reason For Consultation:  Osteoporosis    Subjective:       HPI  Brett Nugent is a 69 y.o. White male with osteoporosis comes for follow-up.    Today  Last seen on December.  Received Prolia but referred ongoing musculoskeletal pain after injection.  Recommended to switch therapy to Forteo, awaiting to receive medication.  Planned for tooth extraction next week.  Voices no acute complaints.  No episodes of falls or fractures since last visit.      Review of Systems   Constitutional: Negative for chills, fever and malaise/fatigue.   Eyes: Negative for pain and redness.   Respiratory: Negative for cough, hemoptysis and shortness of breath.    Cardiovascular: Negative for chest pain and leg swelling.   Gastrointestinal: Negative for abdominal pain, blood in stool and melena.   Genitourinary: Negative for dysuria and hematuria.   Musculoskeletal: Negative for falls and joint pain.   Skin: Negative for rash.   Neurological: Negative for tingling and focal weakness.   Psychiatric/Behavioral: Negative for memory loss. The patient does not have insomnia.      Chronic comorbid conditions affecting medical decision making today:  Past Medical History:   Diagnosis Date    Anxiety     Enlarged prostate     General anesthetics causing adverse effect in therapeutic use     Slow to wake    GERD (gastroesophageal reflux disease)     [place] (hard of hearing)     has hearing aids    Hyperlipidemia     Osteoporosis     White coat syndrome without diagnosis of hypertension      Past Surgical History:   Procedure Laterality Date    FUSION OF POSTERIOR COLUMN OF CERVICAL SPINE USING COMPUTER AIDED NAVIGATION Bilateral 3/4/2019    Procedure: FUSION, SPINE, POSTERIOR SPINAL COLUMN, CERVICAL,  USING COMPUTER-ASSISTED NAVIGATION;  Surgeon: Helio Wolfe MD;  Location: [facility] OR;  Service: Neurosurgery;  Laterality: Bilateral;  C3-5    PROSTATE SURGERY      REPAIR OF CEREBROSPINAL FLUID LEAK OF SPINE Bilateral 3/14/2019    Procedure: REPAIR, CSF LEAK, SPINE;  Surgeon: Heilo Wolfe MD;  Location: [facility] OR;  Service: Neurosurgery;  Laterality: Bilateral;    SINUS SURGERY      SPINE SURGERY       History reviewed. No pertinent family history.  Social History     Substance and Sexual Activity   Alcohol Use No     Social History     Tobacco Use   Smoking Status Never Smoker   Smokeless Tobacco Never Used     Social History     Substance and Sexual Activity   Drug Use No       Current Outpatient Medications:     aspirin (ECOTRIN) 81 MG EC tablet, Take 81 mg by mouth once daily., Disp: , Rfl:     calcium carbonate (OS-JURGEN) 500 mg calcium (1,250 mg) tablet, Take 1 tablet by mouth once daily., Disp: , Rfl:     cycloSPORINE (RESTASIS) 0.05 % ophthalmic emulsion, Place 1 drop into both eyes 2 (two) times daily., Disp: , Rfl:     esomeprazole (NEXIUM) 40 MG capsule, TAKE ONE CAPSULE BY MOUTH EVERY DAY, Disp: 30 capsule, Rfl: 11    simvastatin (ZOCOR) 40 MG tablet, Take 1 tablet (40 mg total) by mouth every evening., Disp: 30 tablet, Rfl: 11    vitamin D (VITAMIN D3) 1000 units Tab, Take 5,000 Units by mouth once daily. , Disp: , Rfl:     teriparatide (FORTEO) 20 mcg/dose - 600 mcg/2.4 mL PnIj, Inject 0.08 mLs (20 mcg total) into the skin once daily. (Patient not taking: Reported on 6/11/2020), Disp: 28.8 mL, Rfl: 1    Current Facility-Administered Medications:     denosumab (PROLIA) injection 60 mg, 60 mg, Subcutaneous, Q6 Months, Nicholas Brunson MD, 60 mg at 05/29/19 0907    denosumab (PROLIA) injection 60 mg, 60 mg, Subcutaneous, Q6 Months, Nicholas Brunson MD    denosumab (PROLIA) injection 60 mg, 60 mg, Subcutaneous, Q6 Months, Nicholas Brunson MD    denosumab (PROLIA) injection 60 mg, 60 mg,  "Subcutaneous, Q6 Months, Nicholas Brunson MD, 60 mg at 12/11/19 1600    Facility-Administered Medications Ordered in Other Visits:     chlorhexidine 0.12 % solution 10 mL, 10 mL, Mouth/Throat, On Call Procedure, Kathy Elizabeth PA-C    lidocaine (PF) 10 mg/ml (1%) injection 10 mg, 1 mL, Intradermal, Once, Homer Johnson,     nozaseptin (NOZIN) nasal , , Each Nostril, On Call Procedure, Kathy Elizabeth PA-C       Objective:         Vitals:    06/11/20 1037   BP: (!) 155/79   Pulse: (!) 54     Physical Exam   Constitutional: He is oriented to person, place, and time. No distress.   Estimated body mass index is 21.03 kg/m² as calculated from the following:    Height as of this encounter: 6' 2" (1.88 m).    Weight as of this encounter: 74.3 kg (163 lb 12.8 oz).    Wt Readings from Last 1 Encounters:  06/11/20 1037 : 74.3 kg (163 lb 12.8 oz)     HENT:   Head: Normocephalic and atraumatic.   Eyes: Conjunctivae are normal. Pupils are equal, round, and reactive to light.   Neck: Normal range of motion.   Cardiovascular: Normal rate and intact distal pulses.    Pulmonary/Chest: Effort normal. No respiratory distress.   Abdominal: Soft. He exhibits no distension.   Neurological: He is alert and oriented to person, place, and time.   Skin: No rash noted. No erythema.     Musculoskeletal: Normal range of motion.   : strong  No synovitis       Reviewed old and all outside pertinent medical records available.    All lab results personally reviewed and interpreted by me.  Lab Results   Component Value Date    WBC 10.63 03/14/2019    HGB 13.4 (L) 03/14/2019    HCT 40.7 03/14/2019    MCV 90 03/14/2019    MCH 29.5 03/14/2019    MCHC 32.9 03/14/2019    RDW 14.3 03/14/2019     03/14/2019    MPV 9.0 (L) 03/14/2019    NEUTROABS 4.2 10/12/2018       Lab Results   Component Value Date     06/05/2020    K 4.0 06/05/2020     06/05/2020    CO2 26 06/05/2020    GLU 53 (L) 06/05/2020    BUN 22 " 06/05/2020    CALCIUM 8.8 06/05/2020    PROT 6.7 06/05/2020    ALBUMIN 3.5 06/05/2020    BILITOT 0.5 06/05/2020    AST 19 06/05/2020    ALKPHOS 40 (L) 06/05/2020    ALT 13 06/05/2020       Lab Results   Component Value Date    COLORU Yellow 03/04/2019    APPEARANCEUA Clear 03/04/2019    SPECGRAV 1.025 03/04/2019    PHUR 5.0 03/04/2019    PROTEINUA Negative 03/04/2019    KETONESU Negative 03/04/2019    LEUKOCYTESUR Negative 03/04/2019    NITRITE Negative 03/04/2019    UROBILINOGEN Negative 03/04/2019       No results found for: CRP    No results found for: SEDRATE, ERYTHROCYTES    No results found for: JAYLIN, RF, SEDRATE    No components found for: 25OHVITDTOT, 75JOQVBP0, 15GGKCJX2, METHODNOTE    No results found for: URICACID    No components found for: TSPOTTB    Rheum Labs:  n/a     Infectious Labs:  HCV: NR     Imaging:  All imaging reviewed and independently  interpreted by me.    DEXA scan  June 2020 October 2018  FN: -2.5 -2.6  LS: -3.6 -4.5     ASSESSMENT / PLAN:     Brett Nugent is a 69 y.o. White male with:    1. Osteoporosis  - previously on Fosamax.  Prolia August 2018-12/2020, discontinued due to ongoing MSK pain  - still with increased risk of fragility fracture  - awaiting to initiate therapy with Forteo.  Recommend starting 2 weeks after tooth extraction next week  - CMP prior next visit  - avoid immobility, fall prevention.  - adequate dietary calcium and vitamin-D intake  - Comprehensive metabolic panel; Future    2. Other specified counseling  - over 10 min spent on the following topics  - Limitation of alcohol consumption.  - Regular exercise:  Aerobic and resistance.  - Medication counseling provided.   - Avoid immobility, fall prevention      Follow up in about 4 months (around 10/11/2020).    Method of contact with patient concerns: [facility]    Nicholas Brunson M.D.  Rheumatology Department   [facility]

## 2020-06-24 ENCOUNTER — TELEPHONE (OUTPATIENT)
Dept: PHARMACY | Facility: CLINIC | Age: 70
End: 2020-06-24

## 2020-06-24 NOTE — TELEPHONE ENCOUNTER
Initial Forteo consult completed on 2020. Patient is now ready to being treatment - plans to do first dose on 2020. Forteo will be shipped via [employer] on  for delivery on . $75.00 copay at [address] [place] [address]. Reminded that [facility] will send with Sharps, Pen Needles, Alcohol Swabs, and [facility] Welcome Packet. Mr. West did not have any questions or concerns at this time. Offered to review medication again, but patient declined - he feels comfortable starting. Pt stated that he watched training video, but he lost the link - requested [facility] resend (done via [employer]). Confirmed 2 patient identifiers - name and .    Luis Reyes, PharmD  Clinical Pharmacist  [facility]  P: [phone]

## 2020-07-18 ENCOUNTER — LAB VISIT (OUTPATIENT)
Dept: PRIMARY CARE CLINIC | Facility: CLINIC | Age: 70
End: 2020-07-18
Payer: MEDICARE

## 2020-07-18 DIAGNOSIS — Z00.6 RESEARCH STUDY PATIENT: ICD-10-CM

## 2020-07-18 PROCEDURE — 86769 SARS-COV-2 COVID-19 ANTIBODY: CPT

## 2020-07-18 PROCEDURE — U0003 INFECTIOUS AGENT DETECTION BY NUCLEIC ACID (DNA OR RNA); SEVERE ACUTE RESPIRATORY SYNDROME CORONAVIRUS 2 (SARS-COV-2) (CORONAVIRUS DISEASE [COVID-19]), AMPLIFIED PROBE TECHNIQUE, MAKING USE OF HIGH THROUGHPUT TECHNOLOGIES AS DESCRIBED BY CMS-2020-01-R: HCPCS

## 2020-07-18 NOTE — RESEARCH
Date of Consent: 7/18/2020    Sponsor: [facility]    Study Title/IRB Number: Observational study of Sars-CoV2 Immunoglobulin G (IgG) seroprevalence among the [facility] population over time 2020.163  Principle Investigator: Bertha Gonzales, PhD    Did the patient need translation services? No   name: N/A    Prior to the Informed Consent (IC) being signed, or any study protocol required data collection, testing, procedure, or intervention being performed, the following was done and/or discussed:   Patient was given a paper copy of the IC for review    Patient was given study FAQ   Purpose of the study and qualifications to participate    Study design and tests or procedures done at this visit   Confidentiality and HIPAA Authorization for Release of Medical Records for the research trial/ subject's rights/research related injury   Risk, Benefits, Alternative Treatments, Compensation and Costs   Participation in the research trial is voluntary and patient may withdraw at anytime   Contact information for study related questions    Patient verbalizes understanding of the above: Yes  Contact information for PI and IRB given to patient: Yes  Patient able to adequately summarize: the purpose of the study, the risks associated with the study, and all procedures, testing, and follow-ups associated with the study: Yes    The consent was discussed verbally with the patient and all questions were answered satisfactorily. Patient gave verbal consent for the Seroprevalence research study with an IRB approval date of 06/23/2020.      The Consent, Consent Witness and name of Clinical Research Coordinator consenting was captured and documented in REDCap.    All Inclusion and Exclusion Criteria reviewed, subject meets all Inclusion criteria and does not meet any Exclusion Criteria at this time.     Patient Eligibility was confirmed.    Patient responded to survey questions.    The following biospecimen  collection procedures were collected:    -Nasopharyngeal Swab Collection  -Blood collection

## 2020-07-19 LAB — SARS-COV-2 IGG SERPLBLD QL IA.RAPID: NEGATIVE

## 2020-07-21 LAB — SARS-COV-2 RNA RESP QL NAA+PROBE: NOT DETECTED

## 2020-07-22 ENCOUNTER — TELEPHONE (OUTPATIENT)
Dept: PHARMACY | Facility: CLINIC | Age: 70
End: 2020-07-22

## 2020-08-20 ENCOUNTER — TELEPHONE (OUTPATIENT)
Dept: PHARMACY | Facility: CLINIC | Age: 70
End: 2020-08-20

## 2020-09-15 ENCOUNTER — TELEPHONE (OUTPATIENT)
Dept: PHARMACY | Facility: CLINIC | Age: 70
End: 2020-09-15

## 2020-10-15 ENCOUNTER — TELEPHONE (OUTPATIENT)
Dept: PHARMACY | Facility: CLINIC | Age: 70
End: 2020-10-15

## 2020-10-15 ENCOUNTER — LAB VISIT (OUTPATIENT)
Dept: LAB | Facility: HOSPITAL | Age: 70
End: 2020-10-15
Attending: INTERNAL MEDICINE
Payer: MEDICARE

## 2020-10-15 DIAGNOSIS — M81.0 AGE-RELATED OSTEOPOROSIS WITHOUT CURRENT PATHOLOGICAL FRACTURE: ICD-10-CM

## 2020-10-15 LAB
ALBUMIN SERPL BCP-MCNC: 3.6 G/DL (ref 3.5–5.2)
ALP SERPL-CCNC: 50 U/L (ref 55–135)
ALT SERPL W/O P-5'-P-CCNC: 13 U/L (ref 10–44)
ANION GAP SERPL CALC-SCNC: 8 MMOL/L (ref 8–16)
AST SERPL-CCNC: 21 U/L (ref 10–40)
BILIRUB SERPL-MCNC: 0.3 MG/DL (ref 0.1–1)
BUN SERPL-MCNC: 26 MG/DL (ref 8–23)
CALCIUM SERPL-MCNC: 9.1 MG/DL (ref 8.7–10.5)
CHLORIDE SERPL-SCNC: 109 MMOL/L (ref 95–110)
CO2 SERPL-SCNC: 27 MMOL/L (ref 23–29)
CREAT SERPL-MCNC: 0.8 MG/DL (ref 0.5–1.4)
EST. GFR  (AFRICAN AMERICAN): >60 ML/MIN/1.73 M^2
EST. GFR  (NON AFRICAN AMERICAN): >60 ML/MIN/1.73 M^2
GLUCOSE SERPL-MCNC: 74 MG/DL (ref 70–110)
POTASSIUM SERPL-SCNC: 4.2 MMOL/L (ref 3.5–5.1)
PROT SERPL-MCNC: 6.6 G/DL (ref 6–8.4)
SODIUM SERPL-SCNC: 144 MMOL/L (ref 136–145)

## 2020-10-15 PROCEDURE — 80053 COMPREHEN METABOLIC PANEL: CPT

## 2020-10-15 PROCEDURE — 36415 COLL VENOUS BLD VENIPUNCTURE: CPT | Mod: PO

## 2020-10-22 ENCOUNTER — OFFICE VISIT (OUTPATIENT)
Dept: RHEUMATOLOGY | Facility: CLINIC | Age: 70
End: 2020-10-22
Payer: MEDICARE

## 2020-10-22 VITALS
HEIGHT: 74 IN | BODY MASS INDEX: 20.54 KG/M2 | WEIGHT: 160.06 LBS | DIASTOLIC BLOOD PRESSURE: 80 MMHG | HEART RATE: 58 BPM | SYSTOLIC BLOOD PRESSURE: 143 MMHG

## 2020-10-22 DIAGNOSIS — Z71.89 COUNSELING ON HEALTH PROMOTION AND DISEASE PREVENTION: ICD-10-CM

## 2020-10-22 DIAGNOSIS — M81.0 OSTEOPOROSIS, UNSPECIFIED OSTEOPOROSIS TYPE, UNSPECIFIED PATHOLOGICAL FRACTURE PRESENCE: ICD-10-CM

## 2020-10-22 DIAGNOSIS — M51.36 DDD (DEGENERATIVE DISC DISEASE), LUMBAR: ICD-10-CM

## 2020-10-22 DIAGNOSIS — M81.0 AGE-RELATED OSTEOPOROSIS WITHOUT CURRENT PATHOLOGICAL FRACTURE: Primary | ICD-10-CM

## 2020-10-22 PROCEDURE — 1125F PR PAIN SEVERITY QUANTIFIED, PAIN PRESENT: ICD-10-PCS | Mod: S$GLB,,, | Performed by: INTERNAL MEDICINE

## 2020-10-22 PROCEDURE — 3077F SYST BP >= 140 MM HG: CPT | Mod: CPTII,S$GLB,, | Performed by: INTERNAL MEDICINE

## 2020-10-22 PROCEDURE — 99214 OFFICE O/P EST MOD 30 MIN: CPT | Mod: S$GLB,,, | Performed by: INTERNAL MEDICINE

## 2020-10-22 PROCEDURE — 99999 PR PBB SHADOW E&M-EST. PATIENT-LVL IV: ICD-10-PCS | Mod: PBBFAC,,, | Performed by: INTERNAL MEDICINE

## 2020-10-22 PROCEDURE — 3079F PR MOST RECENT DIASTOLIC BLOOD PRESSURE 80-89 MM HG: ICD-10-PCS | Mod: CPTII,S$GLB,, | Performed by: INTERNAL MEDICINE

## 2020-10-22 PROCEDURE — 3077F PR MOST RECENT SYSTOLIC BLOOD PRESSURE >= 140 MM HG: ICD-10-PCS | Mod: CPTII,S$GLB,, | Performed by: INTERNAL MEDICINE

## 2020-10-22 PROCEDURE — 1125F AMNT PAIN NOTED PAIN PRSNT: CPT | Mod: S$GLB,,, | Performed by: INTERNAL MEDICINE

## 2020-10-22 PROCEDURE — 1159F MED LIST DOCD IN RCRD: CPT | Mod: S$GLB,,, | Performed by: INTERNAL MEDICINE

## 2020-10-22 PROCEDURE — 3008F BODY MASS INDEX DOCD: CPT | Mod: CPTII,S$GLB,, | Performed by: INTERNAL MEDICINE

## 2020-10-22 PROCEDURE — 1101F PR PT FALLS ASSESS DOC 0-1 FALLS W/OUT INJ PAST YR: ICD-10-PCS | Mod: CPTII,S$GLB,, | Performed by: INTERNAL MEDICINE

## 2020-10-22 PROCEDURE — 1159F PR MEDICATION LIST DOCUMENTED IN MEDICAL RECORD: ICD-10-PCS | Mod: S$GLB,,, | Performed by: INTERNAL MEDICINE

## 2020-10-22 PROCEDURE — 3008F PR BODY MASS INDEX (BMI) DOCUMENTED: ICD-10-PCS | Mod: CPTII,S$GLB,, | Performed by: INTERNAL MEDICINE

## 2020-10-22 PROCEDURE — 99214 PR OFFICE/OUTPT VISIT, EST, LEVL IV, 30-39 MIN: ICD-10-PCS | Mod: S$GLB,,, | Performed by: INTERNAL MEDICINE

## 2020-10-22 PROCEDURE — 1101F PT FALLS ASSESS-DOCD LE1/YR: CPT | Mod: CPTII,S$GLB,, | Performed by: INTERNAL MEDICINE

## 2020-10-22 PROCEDURE — 99999 PR PBB SHADOW E&M-EST. PATIENT-LVL IV: CPT | Mod: PBBFAC,,, | Performed by: INTERNAL MEDICINE

## 2020-10-22 PROCEDURE — 3079F DIAST BP 80-89 MM HG: CPT | Mod: CPTII,S$GLB,, | Performed by: INTERNAL MEDICINE

## 2020-10-22 RX ORDER — CYCLOBENZAPRINE HCL 10 MG
10 TABLET ORAL NIGHTLY
Qty: 30 TABLET | Refills: 0 | Status: SHIPPED | OUTPATIENT
Start: 2020-10-22 | End: 2020-11-01

## 2020-10-22 NOTE — PATIENT INSTRUCTIONS
Cyclobenzaprine tablets  What is this medicine?  CYCLOBENZAPRINE (sye kloe CHHAYA za preen) is a muscle relaxer. It is used to treat muscle pain, spasms, and stiffness.  How should I use this medicine?  Take this medicine by mouth with a glass of water. Follow the directions on the prescription label. If this medicine upsets your stomach, take it with food or milk. Take your medicine at regular intervals. Do not take it more often than directed.  Talk to your pediatrician regarding the use of this medicine in children. Special care may be needed.  What side effects may I notice from receiving this medicine?  Side effects that you should report to your doctor or health care professional as soon as possible:  · allergic reactions like skin rash, itching or hives, swelling of the face, lips, or tongue  · breathing problems  · chest pain  · fast, irregular heartbeat  · hallucinations  · seizures  · unusually weak or tired  Side effects that usually do not require medical attention (report to your doctor or health care professional if they continue or are bothersome):  · headache  · nausea, vomiting  What may interact with this medicine?  Do not take this medicine with any of the following medications:  · certain medicines for fungal infections like fluconazole, itraconazole, ketoconazole, posaconazole, voriconazole  · cisapride  · dofetilide  · dronedarone  · halofantrine  · levomethadyl  · MAOIs like Carbex, Eldepryl, Marplan, Nardil, and Parnate  · narcotic medicines for cough  · pimozide  · thioridazine  · ziprasidone  This medicine may also interact with the following medications:  · alcohol  · antihistamines for allergy, cough and cold  · certain medicines for anxiety or sleep  · certain medicines for cancer  · certain medicines for depression like amitriptyline, fluoxetine, sertraline  · certain medicines for infection like alfuzosin, chloroquine, clarithromycin, levofloxacin, mefloquine, pentamidine,  troleandomycin  · certain medicines for irregular heart beat  · certain medicines for seizures like phenobarbital, primidone  · contrast dyes  · general anesthetics like halothane, isoflurane, methoxyflurane, propofol  · local anesthetics like lidocaine, pramoxine, tetracaine  · medicines that relax muscles for surgery  · narcotic medicines for pain  · other medicines that prolong the QT interval (cause an abnormal heart rhythm)  · phenothiazines like chlorpromazine, mesoridazine, prochlorperazine  What if I miss a dose?  If you miss a dose, take it as soon as you can. If it is almost time for your next dose, take only that dose. Do not take double or extra doses.  Where should I keep my medicine?  Keep out of the reach of children.  Store at room temperature between 15 and 30 degrees C (59 and 86 degrees F). Keep container tightly closed. Throw away any unused medicine after the expiration date.  What should I tell my health care provider before I take this medicine?  They need to know if you have any of these conditions:  · heart disease, irregular heartbeat, or previous heart attack  · liver disease  · thyroid problem  · an unusual or allergic reaction to cyclobenzaprine, tricyclic antidepressants, lactose, other medicines, foods, dyes, or preservatives  · pregnant or trying to get pregnant  · breast-feeding  What should I watch for while using this medicine?  Tell your doctor or health care professional if your symptoms do not start to get better or if they get worse.  You may get drowsy or dizzy. Do not drive, use machinery, or do anything that needs mental alertness until you know how this medicine affects you. Do not stand or sit up quickly, especially if you are an older patient. This reduces the risk of dizzy or fainting spells. Alcohol may interfere with the effect of this medicine. Avoid alcoholic drinks.  If you are taking another medicine that also causes drowsiness, you may have more side effects. Give  your health care provider a list of all medicines you use. Your doctor will tell you how much medicine to take. Do not take more medicine than directed. Call emergency for help if you have problems breathing or unusual sleepiness.  Your mouth may get dry. Chewing sugarless gum or sucking hard candy, and drinking plenty of water may help. Contact your doctor if the problem does not go away or is severe.  NOTE:This sheet is a summary. It may not cover all possible information. If you have questions about this medicine, talk to your doctor, pharmacist, or health care provider. Copyright© 2017 [employer]        Gabapentin capsules or tablets  What is this medicine?  GABAPENTIN (GA ba pen tin) is used to control partial seizures in adults with epilepsy. It is also used to treat certain types of nerve pain.  How should I use this medicine?  Take this medicine by mouth with a glass of water. Follow the directions on the prescription label. You can take it with or without food. If it upsets your stomach, take it with food.Take your medicine at regular intervals. Do not take it more often than directed. Do not stop taking except on your doctor's advice.  If you are directed to break the 600 or 800 mg tablets in half as part of your dose, the extra half tablet should be used for the next dose. If you have not used the extra half tablet within 28 days, it should be thrown away.  A special MedGuide will be given to you by the pharmacist with each prescription and refill. Be sure to read this information carefully each time.  Talk to your pediatrician regarding the use of this medicine in children. Special care may be needed.  What side effects may I notice from receiving this medicine?  Side effects that you should report to your doctor or health care professional as soon as possible:  · allergic reactions like skin rash, itching or hives, swelling of the face, lips, or tongue  · worsening of mood, thoughts or actions of  suicide or dying  Side effects that usually do not require medical attention (report to your doctor or health care professional if they continue or are bothersome):  · constipation  · difficulty walking or controlling muscle movements  · dizziness  · nausea  · slurred speech  · tiredness  · tremors  · weight gain  What may interact with this medicine?  Do not take this medicine with any of the following medications:  · other gabapentin products  This medicine may also interact with the following medications:  · alcohol  · antacids  · antihistamines for allergy, cough and cold  · certain medicines for anxiety or sleep  · certain medicines for depression or psychotic disturbances  · homatropine; hydrocodone  · naproxen  · narcotic medicines (opiates) for pain  · phenothiazines like chlorpromazine, mesoridazine, prochlorperazine, thioridazine  What if I miss a dose?  If you miss a dose, take it as soon as you can. If it is almost time for your next dose, take only that dose. Do not take double or extra doses.  Where should I keep my medicine?  Keep out of reach of children.  This medicine may cause accidental overdose and death if it taken by other adults, children, or pets. Mix any unused medicine with a substance like cat litter or coffee grounds. Then throw the medicine away in a sealed container like a sealed bag or a coffee can with a lid. Do not use the medicine after the expiration date.  Store at room temperature between 15 and 30 degrees C (59 and 86 degrees F).  What should I tell my health care provider before I take this medicine?  They need to know if you have any of these conditions:  · kidney disease  · suicidal thoughts, plans, or attempt; a previous suicide attempt by you or a family member  · an unusual or allergic reaction to gabapentin, other medicines, foods, dyes, or preservatives  · pregnant or trying to get pregnant  · breast-feeding  What should I watch for while using this medicine?  Visit  your doctor or health care professional for regular checks on your progress. You may want to keep a record at home of how you feel your condition is responding to treatment. You may want to share this information with your doctor or health care professional at each visit. You should contact your doctor or health care professional if your seizures get worse or if you have any new types of seizures. Do not stop taking this medicine or any of your seizure medicines unless instructed by your doctor or health care professional. Stopping your medicine suddenly can increase your seizures or their severity.  Wear a medical identification bracelet or chain if you are taking this medicine for seizures, and carry a card that lists all your medications.  You may get drowsy, dizzy, or have blurred vision. Do not drive, use machinery, or do anything that needs mental alertness until you know how this medicine affects you. To reduce dizzy or fainting spells, do not sit or stand up quickly, especially if you are an older patient. Alcohol can increase drowsiness and dizziness. Avoid alcoholic drinks.  Your mouth may get dry. Chewing sugarless gum or sucking hard candy, and drinking plenty of water will help.  The use of this medicine may increase the chance of suicidal thoughts or actions. Pay special attention to how you are responding while on this medicine. Any worsening of mood, or thoughts of suicide or dying should be reported to your health care professional right away.  Women who become pregnant while using this medicine may enroll in the [place] Antiepileptic Drug Pregnancy Registry by calling 1-985.226.6172. This registry collects information about the safety of antiepileptic drug use during pregnancy.  NOTE:This sheet is a summary. It may not cover all possible information. If you have questions about this medicine, talk to your doctor, pharmacist, or health care provider. Copyright© 2017 [employer]        Relieving Back Pain  Back pain is a common problem. You can strain back muscles by lifting too much weight or just by moving the wrong way. Back strain can be uncomfortable, even painful. And it can take weeks or months to improve. To help yourself feel better and prevent future back strains, try these tips.  Important Note: Do not give aspirin to children or teens without first discussing it with your healthcare provider.      ? Ice    Ice reduces muscle pain and swelling. It helps most during the first 24 to 48 hours after an injury.  · Wrap an ice pack or a bag of frozen peas in a thin towel. (Never place ice directly on your skin.)  · Place the ice where your back hurts the most.  · Dont ice for more than 20 minutes at a time.  · You can use ice several times a day.  ? Medicines  Over-the-counter pain relievers can include acetaminophen and anti-inflammatory medicines, which includes aspirin or ibuprofen. They can help ease discomfort. Some also reduce swelling.  · Tell your healthcare provider about any medicines you are already taking.  · Take medicines only as directed.  ? Heat  After the first 48 hours, heat can relax sore muscles and improve blood flow.  · Try a warm bath or shower. Or use a heating pad set on low. To prevent a burn, keep a cloth between you and the heating pad.  · Dont use a heating pad for more than 15 minutes at a time. Never sleep on a heating pad.  Date Last Reviewed: 9/1/2015  © 8649-9945 [employer] [address] [place] [address], [place] [address]. All rights reserved. This information is not intended as a substitute for professional medical care. Always follow your healthcare professional's instructions.

## 2020-10-22 NOTE — PROGRESS NOTES
RHEUMATOLOGY OUTPATIENT CLINIC NOTE    10/22/2020    Attending Rheumatologist: Nicholas Brunson  Primary Care Provider: Leonardo Khan MD   Physician Requesting Consultation: No referring provider defined for this encounter.  Chief Complaint/Reason For Consultation:  Osteoporosis    Subjective:       HPI  Brett Nugent is a 70 y.o. White male with osteoporosis comes for follow-up.    Today  Last seen on June.  May switch to Forteo, tolerating injections without side effects.  No acute complaints.  Denies falls or fractures since last visit.  Main complaint is episodic chronic lower back pain.  Worst in the evening, aggravated by range of motion/weight bearing, relieved somewhat by rest and OTC pain medication.  Denies association with prolonged morning stiffness, fever, or paresthesias/other focal neurological symptoms.      Review of Systems   Constitutional: Negative for chills, fever and malaise/fatigue.   Eyes: Negative for pain and redness.   Respiratory: Negative for cough, hemoptysis and shortness of breath.    Cardiovascular: Negative for chest pain and leg swelling.   Gastrointestinal: Negative for abdominal pain, blood in stool and melena.   Genitourinary: Negative for dysuria and hematuria.   Musculoskeletal: Positive for back pain (Chronic, intermittent.  Mechanical features.  No alarm signs or symptoms.). Negative for falls and joint pain.   Skin: Negative for rash.   Neurological: Negative for tingling and focal weakness.   Psychiatric/Behavioral: Negative for memory loss. The patient does not have insomnia.      Chronic comorbid conditions affecting medical decision making today:  Past Medical History:   Diagnosis Date    Anxiety     Enlarged prostate     General anesthetics causing adverse effect in therapeutic use     Slow to wake    GERD (gastroesophageal reflux disease)     [place] (hard of hearing)     has hearing aids    Hyperlipidemia     Osteoporosis     White coat syndrome without  diagnosis of hypertension      Past Surgical History:   Procedure Laterality Date    FUSION OF POSTERIOR COLUMN OF CERVICAL SPINE USING COMPUTER AIDED NAVIGATION Bilateral 3/4/2019    Procedure: FUSION, SPINE, POSTERIOR SPINAL COLUMN, CERVICAL, USING COMPUTER-ASSISTED NAVIGATION;  Surgeon: Helio Wolfe MD;  Location: [facility] OR;  Service: Neurosurgery;  Laterality: Bilateral;  C3-5    PROSTATE SURGERY      REPAIR OF CEREBROSPINAL FLUID LEAK OF SPINE Bilateral 3/14/2019    Procedure: REPAIR, CSF LEAK, SPINE;  Surgeon: Helio Wolfe MD;  Location: [facility] OR;  Service: Neurosurgery;  Laterality: Bilateral;    SINUS SURGERY      SPINE SURGERY       History reviewed. No pertinent family history.  Social History     Substance and Sexual Activity   Alcohol Use No     Social History     Tobacco Use   Smoking Status Never Smoker   Smokeless Tobacco Never Used     Social History     Substance and Sexual Activity   Drug Use No       Current Outpatient Medications:     aspirin (ECOTRIN) 81 MG EC tablet, Take 81 mg by mouth once daily., Disp: , Rfl:     calcium carbonate (OS-JURGEN) 500 mg calcium (1,250 mg) tablet, Take 1 tablet by mouth once daily., Disp: , Rfl:     cycloSPORINE (RESTASIS) 0.05 % ophthalmic emulsion, Place 1 drop into both eyes 2 (two) times daily., Disp: , Rfl:     esomeprazole (NEXIUM) 40 MG capsule, TAKE ONE CAPSULE BY MOUTH EVERY DAY, Disp: 30 capsule, Rfl: 1    simvastatin (ZOCOR) 40 MG tablet, Take 1 tablet (40 mg total) by mouth every evening., Disp: 30 tablet, Rfl: 3    teriparatide (FORTEO) 20 mcg/dose - 600 mcg/2.4 mL PnIj, Inject 0.08 mLs (20 mcg total) into the skin once daily., Disp: 28.8 mL, Rfl: 1    vitamin D (VITAMIN D3) 1000 units Tab, Take 5,000 Units by mouth once daily. , Disp: , Rfl:     cyclobenzaprine (FLEXERIL) 10 MG tablet, Take 1 tablet (10 mg total) by mouth every evening. for 10 days, Disp: 30 tablet, Rfl: 0    Current Facility-Administered Medications:     denosumab  "(PROLIA) injection 60 mg, 60 mg, Subcutaneous, Q6 Months, Nicholas Brunson MD, 60 mg at 05/29/19 0907    denosumab (PROLIA) injection 60 mg, 60 mg, Subcutaneous, Q6 Months, Nicholas Brunson MD    denosumab (PROLIA) injection 60 mg, 60 mg, Subcutaneous, Q6 Months, Nicholas Brunson MD    denosumab (PROLIA) injection 60 mg, 60 mg, Subcutaneous, Q6 Months, Nicholas Brunson MD, 60 mg at 12/11/19 1600    Facility-Administered Medications Ordered in Other Visits:     chlorhexidine 0.12 % solution 10 mL, 10 mL, Mouth/Throat, On Call Procedure, Kathy Elizabeth PA-C    lidocaine (PF) 10 mg/ml (1%) injection 10 mg, 1 mL, Intradermal, Once, Homer Johnson,     nozaseptin (NOZIN) nasal , , Each Nostril, On Call Procedure, Kathy Elizabeth PA-C       Objective:         Vitals:    10/22/20 1107   BP: (!) 143/80   Pulse: (!) 58     Physical Exam   Constitutional: He is oriented to person, place, and time. No distress.   Estimated body mass index is 20.55 kg/m² as calculated from the following:    Height as of this encounter: 6' 2" (1.88 m).    Weight as of this encounter: 72.6 kg (160 lb 0.9 oz).    Wt Readings from Last 1 Encounters:  10/22/20 1107 : 72.6 kg (160 lb 0.9 oz)     HENT:   Head: Normocephalic and atraumatic.   Eyes: Conjunctivae are normal. Pupils are equal, round, and reactive to light.   Neck: Normal range of motion.   Cardiovascular: Normal rate and intact distal pulses.    Pulmonary/Chest: Effort normal. No respiratory distress.   Abdominal: Soft. He exhibits no distension.   Neurological: He is alert and oriented to person, place, and time.   Skin: No rash noted. No erythema.     Musculoskeletal: Normal range of motion.      Comments: : strong  No synovitis or significant squeeze tenderness    AROM: intact  PROM: intact    Devices used by patient: none       Reviewed old and all outside pertinent medical records available.    All lab results personally reviewed and interpreted by me.  Lab " Results   Component Value Date    WBC 10.63 03/14/2019    HGB 13.4 (L) 03/14/2019    HCT 40.7 03/14/2019    MCV 90 03/14/2019    MCH 29.5 03/14/2019    MCHC 32.9 03/14/2019    RDW 14.3 03/14/2019     03/14/2019    MPV 9.0 (L) 03/14/2019    NEUTROABS 4.2 10/12/2018       Lab Results   Component Value Date     10/15/2020    K 4.2 10/15/2020     10/15/2020    CO2 27 10/15/2020    GLU 74 10/15/2020    BUN 26 (H) 10/15/2020    CALCIUM 9.1 10/15/2020    PROT 6.6 10/15/2020    ALBUMIN 3.6 10/15/2020    BILITOT 0.3 10/15/2020    AST 21 10/15/2020    ALKPHOS 50 (L) 10/15/2020    ALT 13 10/15/2020       Lab Results   Component Value Date    COLORU Yellow 03/04/2019    APPEARANCEUA Clear 03/04/2019    SPECGRAV 1.025 03/04/2019    PHUR 5.0 03/04/2019    PROTEINUA Negative 03/04/2019    KETONESU Negative 03/04/2019    LEUKOCYTESUR Negative 03/04/2019    NITRITE Negative 03/04/2019    UROBILINOGEN Negative 03/04/2019       No results found for: CRP    No results found for: SEDRATE, ERYTHROCYTES    No results found for: JAYLIN, RF, SEDRATE    No components found for: 25OHVITDTOT, 63CNTAZL9, 14VGVXRL1, METHODNOTE    No results found for: URICACID    No components found for: TSPOTTB    Rheum Labs:  n/a     Infectious Labs:  HCV: NR     Imaging:  All imaging reviewed and independently  interpreted by me.    MRI lumbar spine February 2019  Thoracolumbar scoliosis.  L4-5 spondylolisthesis. Multilevel degenerative disc disease with facet arthritis resulting in central and high-grade foraminal stenosis as described above in detail. L2-3, L3-4 and L4-5 foraminal stenosis with possible nerve root impingement     DEXA scan  June 2020 October 2018  FN: -2.5 -2.6  LS: -3.6 -4.5     ASSESSMENT / PLAN:     Brett Nugent is a 70 y.o. White male with:    1. Osteoporosis  - previously on Fosamax and Prolia August 2018-12/2020, discontinued due to ongoing MSK pain  - initiate Forteo last June, tolerating without side effects.   Complete total 24 months of therapy  - avoid immobility, fall prevention.  - adequate dietary calcium and vitamin-D intake    2. Chronic lower back pain  - mechanical features, follows with Neurosurgery.  No alarm signs or symptoms.  - Discussed and recommended exercise (alberto non wt-bearing/swimming)  - resting affected joint for brief periods (<12 h),   - Acetaminophen prn -> standing -> NSAIDs short course (if persistent pain)  - Topicals therapy: Capsaicin / NSAIDs   - short trial of muscle relaxants.  Consider gabapentin and physical therapy    3. Other specified counseling  - Regular exercise:  Aerobic and resistance.  - Medication counseling provided.   - Avoid immobility, fall prevention      Follow up in about 1 year (around 10/22/2021).    Method of contact with patient concerns: [facility]    Time spent: 25 minutes in face to face discussion concerning diagnosis, prognosis, review of lab and test results, benefits of treatment as well as management of disease, counseling of patient and coordination of care between various health care providers.  Greater than half the time spent was used for coordination of care and counseling of patient.    Nicholas Brunson M.D.  Rheumatology Department   [facility]

## 2020-10-27 ENCOUNTER — SPECIALTY PHARMACY (OUTPATIENT)
Dept: PHARMACY | Facility: CLINIC | Age: 70
End: 2020-10-27

## 2020-10-27 NOTE — TELEPHONE ENCOUNTER
Specialty Pharmacy - Clinical Reassessment    Specialty Medication Orders Linked to Encounter      Most Recent Value   Medication #1  teriparatide (FORTEO) 20 mcg/dose - 600 mcg/2.4 mL PnIj (Order#[id number], Rx#[id number])        Subjective    Brett Nugent is a 70 y.o. male, who is followed by the specialty pharmacy service for management and education.    Encounters since last clinical assessment   No encounters found.   Clinical call attempts since last clinical assessment   No call attempts found.     Today he received follow up education for his specialty medication(s).    Current Outpatient Medications   Medication Sig    aspirin (ECOTRIN) 81 MG EC tablet Take 81 mg by mouth once daily.    calcium carbonate (OS-JURGEN) 500 mg calcium (1,250 mg) tablet Take 1 tablet by mouth once daily.    cyclobenzaprine (FLEXERIL) 10 MG tablet Take 1 tablet (10 mg total) by mouth every evening. for 10 days    cycloSPORINE (RESTASIS) 0.05 % ophthalmic emulsion Place 1 drop into both eyes 2 (two) times daily.    esomeprazole (NEXIUM) 40 MG capsule Take 1 capsule (40 mg total) by mouth once daily.    simvastatin (ZOCOR) 40 MG tablet Take 1 tablet (40 mg total) by mouth every evening.    teriparatide (FORTEO) 20 mcg/dose - 600 mcg/2.4 mL PnIj Inject 0.08 mLs (20 mcg total) into the skin once daily.    vitamin D (VITAMIN D3) 1000 units Tab Take 5,000 Units by mouth once daily.    Last reviewed on 10/27/2020  1:35 PM by Suzy Vega, PharmD    Review of patient's allergies indicates:   Allergen Reactions    Tetanus vaccines and toxoid Other (See Comments)     Always told he had a reaction to it   Last reviewed on  10/27/2020 1:35 PM by Suzy Vega      Medication Adherence    What concerns does the patient have in regards to their medications: Patient states he does not have any concerns. Everything is going well.   Patient reported X missed doses in the last month: 0  Any gaps in refill history greater than 2 weeks  "in the last 3 months: no  Demonstrates understanding of importance of adherence: yes  Informant: patient  Reliability of informant: reliable  Provider-estimated medication adherence level: good  Reasons for non-adherence: no problems identified   Other adherence tool: States it is routine and he remembers to take it daily.    Support network for adherence: family member, healthcare provider  Confirmed plan for next specialty medication refill: delivery by pharmacy  Refills needed for supportive medications: not needed       Adverse Effects    *All other systems reviewed and are negative           Objective    He has a past medical history of Anxiety, Enlarged prostate, General anesthetics causing adverse effect in therapeutic use, GERD (gastroesophageal reflux disease), [place] (hard of hearing), Hyperlipidemia, Osteoporosis, and White coat syndrome without diagnosis of hypertension.        BP Readings from Last 4 Encounters:   10/26/20 132/72   10/22/20 (!) 143/80   06/11/20 (!) 155/79   12/11/19 126/76     Ht Readings from Last 4 Encounters:   10/26/20 6' 2" (1.88 m)   10/22/20 6' 2" (1.88 m)   06/11/20 6' 2" (1.88 m)   12/11/19 6' 2" (1.88 m)     Wt Readings from Last 4 Encounters:   10/26/20 72.1 kg (159 lb)   10/22/20 72.6 kg (160 lb 0.9 oz)   06/11/20 74.3 kg (163 lb 12.8 oz)   12/11/19 74.3 kg (163 lb 12.8 oz)       The goals of prescribed drug therapy management include:  · Supporting patient to meet the prescriber's medical treatment objectives  · Improving or maintaining quality of life  · Maintaining optimal therapy adherence  · Minimizing and managing side effects           Assessment/Plan  Patient plans to continue therapy without changes      Indication, dosage, appropriateness, effectiveness, safety and convenience of his specialty medication(s) were reviewed today.     Patient Counseling    Counseled the patient on the following: doses and administration discussed, safe handling, storage, and disposal " discussed, possible adverse effects and management discussed, lab monitoring and follow-up discussed, pharmacy contact information discussed       Patient states he is doing well on Forteo. He takes his injections daily without fail. Denies any side effects or site reactions. Reviewed side effects with patient. Patient states he doesn't need a review of the medication. He feels he is doing fine on therapy. NO pain to report and is exercising 4 x a week. He does weight training and Cardio. No questions or concerns at this time. Patient should continue on Forteo therapy.     Tasks added this encounter   4/18/2021 - Clinical - Follow Up Assesement (180 day)   Tasks due within next 3 months   11/10/2020 - Refill Call     Suzy Vega PharmD  [facility]  [address] [place] [address]  Phone: [phone]  Fax: [phone]

## 2020-11-16 ENCOUNTER — PATIENT MESSAGE (OUTPATIENT)
Dept: RHEUMATOLOGY | Facility: CLINIC | Age: 70
End: 2020-11-16

## 2020-11-17 ENCOUNTER — SPECIALTY PHARMACY (OUTPATIENT)
Dept: PHARMACY | Facility: CLINIC | Age: 70
End: 2020-11-17

## 2020-11-18 ENCOUNTER — PATIENT MESSAGE (OUTPATIENT)
Dept: RHEUMATOLOGY | Facility: CLINIC | Age: 70
End: 2020-11-18

## 2020-11-19 ENCOUNTER — SPECIALTY PHARMACY (OUTPATIENT)
Dept: PHARMACY | Facility: CLINIC | Age: 70
End: 2020-11-19

## 2020-11-19 DIAGNOSIS — M81.0 OSTEOPOROSIS, UNSPECIFIED OSTEOPOROSIS TYPE, UNSPECIFIED PATHOLOGICAL FRACTURE PRESENCE: ICD-10-CM

## 2020-11-19 RX ORDER — TERIPARATIDE 250 UG/ML
20 INJECTION, SOLUTION SUBCUTANEOUS DAILY
Qty: 28.8 ML | Refills: 1 | Status: CANCELLED | OUTPATIENT
Start: 2020-11-19 | End: 2021-11-19

## 2020-11-19 RX ORDER — TERIPARATIDE 250 UG/ML
20 INJECTION, SOLUTION SUBCUTANEOUS DAILY
Qty: 28.8 ML | Refills: 1 | Status: SHIPPED | OUTPATIENT
Start: 2020-11-19 | End: 2021-12-03 | Stop reason: SDUPTHER

## 2020-11-19 NOTE — TELEPHONE ENCOUNTER
Spoke with patient, Forteo script faxed to [facility] per request. Patient does not qualify for any patient assistance.

## 2020-11-20 NOTE — TELEPHONE ENCOUNTER
Specialty Pharmacy - Refill Coordination    Specialty Medication Orders Linked to Encounter      Most Recent Value   Medication #1  teriparatide (FORTEO) 20 mcg/dose - 600 mcg/2.4 mL PnIj (Order#[id number], Rx#[id number])          Refill Questions - Documented Responses      Most Recent Value   Relationship to patient of person spoken to?  Self   HIPAA/medical authority confirmed?  Yes   Any changes in contact preferences or allowed representatives?  No   Has the patient had any insurance changes?  No   Has the patient had any changes to specialty medication, dose, or instructions?  No   Has the patient started taking any new medications, herbals, or supplements?  No   Has the patient been diagnosed with any new medical conditions?  No   Does the patient have any new allergies to medications or foods?  No   Does the patient have any concerns about side effects?  No   Can the patient store medication/sharps container properly (at the correct temperature, away from children/pets, etc.)?  Yes   Can the patient call emergency services (911) in the event of an emergency?  Yes   Does the patient have any concerns or questions about taking or administering this medication as prescribed?  No   How many doses did the patient miss in the past 4 weeks or since the last fill?  (!) > 5   Reported reason for missed doses:  Cost [Missed doses due to pending FA, high copay]   How many doses does the patient have on hand?  0   Does the number of doses/days supply remaining match pharmacy expected amounts?  Yes   Does the patient feel that this medication is effective?  Yes   During the past 4 weeks, has patient missed any activities due to condition or medication?  No   During the past 4 weeks, did patient have any of the following urgent care visits?  None   How will the patient receive the medication?  Mail   When does the patient need to receive the medication?  11/23/20   Shipping Address  Home   Address in [facility]  confirmed and updated if neccessary?  Yes   Expected Copay ($)  188.92   Is the patient able to afford the medication copay?  Yes   Payment Method  CC on file   Days supply of Refill  28   Would patient like to speak to a pharmacist?  No   Do you want to trigger an intervention?  No   Do you want to trigger an additional referral task?  No   Refill activity completed?  Yes   Refill activity plan  Refill scheduled   Shipment/Pickup Date:  11/23/20          Current Outpatient Medications   Medication Sig    aspirin (ECOTRIN) 81 MG EC tablet Take 81 mg by mouth once daily.    calcium carbonate (OS-JURGEN) 500 mg calcium (1,250 mg) tablet Take 1 tablet by mouth once daily.    cycloSPORINE (RESTASIS) 0.05 % ophthalmic emulsion Place 1 drop into both eyes 2 (two) times daily.    esomeprazole (NEXIUM) 40 MG capsule Take 1 capsule (40 mg total) by mouth once daily.    simvastatin (ZOCOR) 40 MG tablet Take 1 tablet (40 mg total) by mouth every evening.    teriparatide (FORTEO) 20 mcg/dose - 600 mcg/2.4 mL PnIj Inject 0.08 mLs (20 mcg total) into the skin once daily.    vitamin D (VITAMIN D3) 1000 units Tab Take 5,000 Units by mouth once daily.    Last reviewed on 10/27/2020  1:35 PM by Suzy Vega, PharmD    Review of patient's allergies indicates:   Allergen Reactions    Tetanus vaccines and toxoid Other (See Comments)     Always told he had a reaction to it    Last reviewed on  11/19/2020 9:14 AM by Nicholas Brunson    Pt reports he will have missed one week of therapy when he receives his refill on 11/24. Pt reports he has been fighting insurance on recent escalation in copay to $188.92 (in Medicare donut hole). Pt is over the income limit to receive financial assistance for Forteo from [employer]. Pt agreeable to copay for the time being. Advised pt to resume therapy as soon as he receives shipment.     Tasks added this encounter   12/13/2020 - Refill Call (Auto Added)   Tasks due within next 3 months   No tasks  due.     Eloisa Orozco, PharmD  [facility]  [address] [facility] [address]  Phone: [phone]  Fax: [phone]

## 2021-01-04 ENCOUNTER — SPECIALTY PHARMACY (OUTPATIENT)
Dept: PHARMACY | Facility: CLINIC | Age: 71
End: 2021-01-04

## 2021-01-28 ENCOUNTER — SPECIALTY PHARMACY (OUTPATIENT)
Dept: PHARMACY | Facility: CLINIC | Age: 71
End: 2021-01-28

## 2021-02-25 ENCOUNTER — SPECIALTY PHARMACY (OUTPATIENT)
Dept: PHARMACY | Facility: CLINIC | Age: 71
End: 2021-02-25

## 2021-03-08 ENCOUNTER — TELEPHONE (OUTPATIENT)
Dept: PHARMACY | Facility: CLINIC | Age: 71
End: 2021-03-08

## 2021-03-16 ENCOUNTER — HOSPITAL ENCOUNTER (OUTPATIENT)
Dept: RADIOLOGY | Facility: HOSPITAL | Age: 71
Discharge: HOME | End: 2021-03-16
Attending: NURSE PRACTITIONER
Payer: MEDICARE

## 2021-03-16 DIAGNOSIS — S49.91XA INJURY OF RIGHT SHOULDER, INITIAL ENCOUNTER: ICD-10-CM

## 2021-03-16 PROCEDURE — 73030 X-RAY EXAM OF SHOULDER: CPT | Mod: 26,RT,, | Performed by: RADIOLOGY

## 2021-03-16 PROCEDURE — 73030 XR SHOULDER COMPLETE 2 OR MORE VIEWS RIGHT: ICD-10-PCS | Mod: 26,RT,, | Performed by: RADIOLOGY

## 2021-03-16 PROCEDURE — 73030 X-RAY EXAM OF SHOULDER: CPT | Mod: TC,PO,RT

## 2021-03-26 ENCOUNTER — SPECIALTY PHARMACY (OUTPATIENT)
Dept: PHARMACY | Facility: CLINIC | Age: 71
End: 2021-03-26

## 2021-04-06 ENCOUNTER — SPECIALTY PHARMACY (OUTPATIENT)
Dept: PHARMACY | Facility: CLINIC | Age: 71
End: 2021-04-06

## 2021-05-05 ENCOUNTER — SPECIALTY PHARMACY (OUTPATIENT)
Dept: PHARMACY | Facility: CLINIC | Age: 71
End: 2021-05-05

## 2021-06-02 ENCOUNTER — SPECIALTY PHARMACY (OUTPATIENT)
Dept: PHARMACY | Facility: CLINIC | Age: 71
End: 2021-06-02

## 2021-07-14 ENCOUNTER — SPECIALTY PHARMACY (OUTPATIENT)
Dept: PHARMACY | Facility: CLINIC | Age: 71
End: 2021-07-14

## 2021-07-14 ENCOUNTER — PATIENT MESSAGE (OUTPATIENT)
Dept: PHARMACY | Facility: CLINIC | Age: 71
End: 2021-07-14

## 2021-08-23 ENCOUNTER — SPECIALTY PHARMACY (OUTPATIENT)
Dept: PHARMACY | Facility: CLINIC | Age: 71
End: 2021-08-23

## 2021-10-05 ENCOUNTER — SPECIALTY PHARMACY (OUTPATIENT)
Dept: PHARMACY | Facility: CLINIC | Age: 71
End: 2021-10-05

## 2021-10-14 ENCOUNTER — PATIENT MESSAGE (OUTPATIENT)
Dept: RHEUMATOLOGY | Facility: CLINIC | Age: 71
End: 2021-10-14
Payer: MEDICARE

## 2021-10-18 ENCOUNTER — LAB VISIT (OUTPATIENT)
Dept: LAB | Facility: HOSPITAL | Age: 71
End: 2021-10-18
Attending: INTERNAL MEDICINE
Payer: MEDICARE

## 2021-10-18 DIAGNOSIS — M81.0 AGE-RELATED OSTEOPOROSIS WITHOUT CURRENT PATHOLOGICAL FRACTURE: ICD-10-CM

## 2021-10-18 LAB
25(OH)D3+25(OH)D2 SERPL-MCNC: 75 NG/ML (ref 30–96)
ALBUMIN SERPL BCP-MCNC: 3.8 G/DL (ref 3.5–5.2)
ALP SERPL-CCNC: 64 U/L (ref 55–135)
ALT SERPL W/O P-5'-P-CCNC: 12 U/L (ref 10–44)
ANION GAP SERPL CALC-SCNC: 9 MMOL/L (ref 8–16)
AST SERPL-CCNC: 17 U/L (ref 10–40)
BILIRUB SERPL-MCNC: 0.4 MG/DL (ref 0.1–1)
BUN SERPL-MCNC: 21 MG/DL (ref 8–23)
CALCIUM SERPL-MCNC: 9.5 MG/DL (ref 8.7–10.5)
CHLORIDE SERPL-SCNC: 103 MMOL/L (ref 95–110)
CO2 SERPL-SCNC: 27 MMOL/L (ref 23–29)
CREAT SERPL-MCNC: 0.8 MG/DL (ref 0.5–1.4)
EST. GFR  (AFRICAN AMERICAN): >60 ML/MIN/1.73 M^2
EST. GFR  (NON AFRICAN AMERICAN): >60 ML/MIN/1.73 M^2
GLUCOSE SERPL-MCNC: 72 MG/DL (ref 70–110)
POTASSIUM SERPL-SCNC: 4.4 MMOL/L (ref 3.5–5.1)
PROT SERPL-MCNC: 7.2 G/DL (ref 6–8.4)
SODIUM SERPL-SCNC: 139 MMOL/L (ref 136–145)

## 2021-10-18 PROCEDURE — 36415 COLL VENOUS BLD VENIPUNCTURE: CPT | Mod: PO | Performed by: INTERNAL MEDICINE

## 2021-10-18 PROCEDURE — 80053 COMPREHEN METABOLIC PANEL: CPT | Performed by: INTERNAL MEDICINE

## 2021-10-18 PROCEDURE — 82306 VITAMIN D 25 HYDROXY: CPT | Performed by: INTERNAL MEDICINE

## 2021-10-21 ENCOUNTER — OFFICE VISIT (OUTPATIENT)
Dept: RHEUMATOLOGY | Facility: CLINIC | Age: 71
End: 2021-10-21
Payer: MEDICARE

## 2021-10-21 VITALS
WEIGHT: 161.38 LBS | SYSTOLIC BLOOD PRESSURE: 157 MMHG | BODY MASS INDEX: 20.71 KG/M2 | DIASTOLIC BLOOD PRESSURE: 81 MMHG | HEART RATE: 52 BPM | HEIGHT: 74 IN

## 2021-10-21 DIAGNOSIS — Z71.89 COUNSELING ON HEALTH PROMOTION AND DISEASE PREVENTION: ICD-10-CM

## 2021-10-21 DIAGNOSIS — M81.0 OSTEOPOROSIS, UNSPECIFIED OSTEOPOROSIS TYPE, UNSPECIFIED PATHOLOGICAL FRACTURE PRESENCE: Primary | ICD-10-CM

## 2021-10-21 PROCEDURE — 1125F AMNT PAIN NOTED PAIN PRSNT: CPT | Mod: CPTII,S$GLB,, | Performed by: INTERNAL MEDICINE

## 2021-10-21 PROCEDURE — 1157F ADVNC CARE PLAN IN RCRD: CPT | Mod: CPTII,S$GLB,, | Performed by: INTERNAL MEDICINE

## 2021-10-21 PROCEDURE — 99214 OFFICE O/P EST MOD 30 MIN: CPT | Mod: S$GLB,,, | Performed by: INTERNAL MEDICINE

## 2021-10-21 PROCEDURE — 3077F SYST BP >= 140 MM HG: CPT | Mod: CPTII,S$GLB,, | Performed by: INTERNAL MEDICINE

## 2021-10-21 PROCEDURE — 3008F PR BODY MASS INDEX (BMI) DOCUMENTED: ICD-10-PCS | Mod: CPTII,S$GLB,, | Performed by: INTERNAL MEDICINE

## 2021-10-21 PROCEDURE — 1159F PR MEDICATION LIST DOCUMENTED IN MEDICAL RECORD: ICD-10-PCS | Mod: CPTII,S$GLB,, | Performed by: INTERNAL MEDICINE

## 2021-10-21 PROCEDURE — 1101F PR PT FALLS ASSESS DOC 0-1 FALLS W/OUT INJ PAST YR: ICD-10-PCS | Mod: CPTII,S$GLB,, | Performed by: INTERNAL MEDICINE

## 2021-10-21 PROCEDURE — 99999 PR PBB SHADOW E&M-EST. PATIENT-LVL IV: ICD-10-PCS | Mod: PBBFAC,,, | Performed by: INTERNAL MEDICINE

## 2021-10-21 PROCEDURE — 1125F PR PAIN SEVERITY QUANTIFIED, PAIN PRESENT: ICD-10-PCS | Mod: CPTII,S$GLB,, | Performed by: INTERNAL MEDICINE

## 2021-10-21 PROCEDURE — 1101F PT FALLS ASSESS-DOCD LE1/YR: CPT | Mod: CPTII,S$GLB,, | Performed by: INTERNAL MEDICINE

## 2021-10-21 PROCEDURE — 99214 PR OFFICE/OUTPT VISIT, EST, LEVL IV, 30-39 MIN: ICD-10-PCS | Mod: S$GLB,,, | Performed by: INTERNAL MEDICINE

## 2021-10-21 PROCEDURE — 3077F PR MOST RECENT SYSTOLIC BLOOD PRESSURE >= 140 MM HG: ICD-10-PCS | Mod: CPTII,S$GLB,, | Performed by: INTERNAL MEDICINE

## 2021-10-21 PROCEDURE — 3079F DIAST BP 80-89 MM HG: CPT | Mod: CPTII,S$GLB,, | Performed by: INTERNAL MEDICINE

## 2021-10-21 PROCEDURE — 3288F FALL RISK ASSESSMENT DOCD: CPT | Mod: CPTII,S$GLB,, | Performed by: INTERNAL MEDICINE

## 2021-10-21 PROCEDURE — 1159F MED LIST DOCD IN RCRD: CPT | Mod: CPTII,S$GLB,, | Performed by: INTERNAL MEDICINE

## 2021-10-21 PROCEDURE — 3288F PR FALLS RISK ASSESSMENT DOCUMENTED: ICD-10-PCS | Mod: CPTII,S$GLB,, | Performed by: INTERNAL MEDICINE

## 2021-10-21 PROCEDURE — 99999 PR PBB SHADOW E&M-EST. PATIENT-LVL IV: CPT | Mod: PBBFAC,,, | Performed by: INTERNAL MEDICINE

## 2021-10-21 PROCEDURE — 3008F BODY MASS INDEX DOCD: CPT | Mod: CPTII,S$GLB,, | Performed by: INTERNAL MEDICINE

## 2021-10-21 PROCEDURE — 3079F PR MOST RECENT DIASTOLIC BLOOD PRESSURE 80-89 MM HG: ICD-10-PCS | Mod: CPTII,S$GLB,, | Performed by: INTERNAL MEDICINE

## 2021-10-21 PROCEDURE — 1157F PR ADVANCE CARE PLAN OR EQUIV PRESENT IN MEDICAL RECORD: ICD-10-PCS | Mod: CPTII,S$GLB,, | Performed by: INTERNAL MEDICINE

## 2021-10-29 ENCOUNTER — SPECIALTY PHARMACY (OUTPATIENT)
Dept: PHARMACY | Facility: CLINIC | Age: 71
End: 2021-10-29
Payer: MEDICARE

## 2021-12-03 ENCOUNTER — SPECIALTY PHARMACY (OUTPATIENT)
Dept: PHARMACY | Facility: CLINIC | Age: 71
End: 2021-12-03
Payer: MEDICARE

## 2021-12-03 DIAGNOSIS — M81.0 OSTEOPOROSIS, UNSPECIFIED OSTEOPOROSIS TYPE, UNSPECIFIED PATHOLOGICAL FRACTURE PRESENCE: ICD-10-CM

## 2021-12-03 RX ORDER — TERIPARATIDE 250 UG/ML
20 INJECTION, SOLUTION SUBCUTANEOUS DAILY
Qty: 28.8 ML | Refills: 1 | Status: SHIPPED | OUTPATIENT
Start: 2021-12-03 | End: 2022-11-08

## 2021-12-09 ENCOUNTER — PATIENT MESSAGE (OUTPATIENT)
Dept: PHARMACY | Facility: CLINIC | Age: 71
End: 2021-12-09
Payer: MEDICARE

## 2021-12-14 ENCOUNTER — SPECIALTY PHARMACY (OUTPATIENT)
Dept: PHARMACY | Facility: CLINIC | Age: 71
End: 2021-12-14
Payer: MEDICARE

## 2022-02-02 ENCOUNTER — SPECIALTY PHARMACY (OUTPATIENT)
Dept: PHARMACY | Facility: CLINIC | Age: 72
End: 2022-02-02
Payer: MEDICARE

## 2022-02-02 NOTE — TELEPHONE ENCOUNTER
Outgoing call regarding Forteo Refill. Pt stated he is running behind and declined refill at this time. Routing to assigned [facility] Suzy KING

## 2022-02-10 NOTE — TELEPHONE ENCOUNTER
Specialty Pharmacy - Refill Coordination    Specialty Medication Orders Linked to Encounter    Flowsheet Row Most Recent Value   Medication #1 teriparatide (FORTEO) 20 mcg/dose (600mcg/2.4mL) PnIj (Order#[id number], Rx#[id number])          Refill Questions - Documented Responses    Flowsheet Row Most Recent Value   Patient Availability and HIPAA Verification    Does patient want to proceed with activity? Yes   HIPAA/medical authority confirmed? Yes   Relationship to patient of person spoken to? Self   Refill Screening Questions    Changes to allergies? No   Changes to medications? No   New conditions since last clinic visit? No   Unplanned office visit, urgent care, ED, or hospital admission in the last 4 weeks? No   How does patient/caregiver feel medication is working? Excellent   Financial problems or insurance changes? No   How many doses of your specialty medications were missed in the last 4 weeks? > 5  [The patient went out of town for a week, and he forgot to bring Forteo with him.]   Why were doses missed? Away from home   Would patient like to speak to a pharmacist? No   When does the patient need to receive the medication? 02/16/22   Refill Delivery Questions    How will the patient receive the medication? Delivery Brenna   When does the patient need to receive the medication? 02/16/22   Shipping Address Home   Address in [facility] confirmed and updated if neccessary? Yes   Expected Copay ($) 75   Is the patient able to afford the medication copay? Yes   Payment Method CC on file   Days supply of Refill 28   Supplies needed? No supplies needed   Refill activity completed? Yes   Refill activity plan Refill scheduled   Shipment/Pickup Date: 02/15/22          Current Outpatient Medications   Medication Sig    aspirin (ECOTRIN) 81 MG EC tablet Take 81 mg by mouth once daily.    calcium carbonate (OS-JURGEN) 500 mg calcium (1,250 mg) tablet Take 1 tablet by mouth once daily.    cycloSPORINE (RESTASIS)  0.05 % ophthalmic emulsion Place 1 drop into both eyes 2 (two) times daily.    esomeprazole (NEXIUM) 40 MG capsule Take 1 capsule (40 mg total) by mouth once daily.    simvastatin (ZOCOR) 40 MG tablet Take 1 tablet (40 mg total) by mouth every evening.    teriparatide (FORTEO) 20 mcg/dose (600mcg/2.4mL) PnIj Inject 0.08 mLs (20 mcg total) into the skin once daily.    vitamin D (VITAMIN D3) 1000 units Tab Take 5,000 Units by mouth once daily.    Last reviewed on 12/14/2021  8:04 AM by Darlin Beltre MA    Review of patient's allergies indicates:   Allergen Reactions    Tetanus vaccines and toxoid Other (See Comments)     Always told he had a reaction to it  Other reaction(s): Unknown    Last reviewed on  12/14/2021 8:03 AM by Darlin Beltre      Tasks added this encounter   No tasks added.   Tasks due within next 3 months   1/28/2022 - Refill Call (Auto Added)     Faye LandrumD  [facility]  [address] [place], [place] [address]  [facility] [address]  Phone: [phone]  Fax: [phone]

## 2022-03-09 ENCOUNTER — SPECIALTY PHARMACY (OUTPATIENT)
Dept: PHARMACY | Facility: CLINIC | Age: 72
End: 2022-03-09
Payer: MEDICARE

## 2022-03-09 NOTE — TELEPHONE ENCOUNTER
Called patient for Forteo refill. Patient reports about 2 weeks on hand and requests a call back next week. Denies missed doses. Will follow up.

## 2022-03-18 NOTE — TELEPHONE ENCOUNTER
Specialty Pharmacy - Refill Coordination    Specialty Medication Orders Linked to Encounter    Flowsheet Row Most Recent Value   Medication #1 teriparatide (FORTEO) 20 mcg/dose (600mcg/2.4mL) PnIj (Order#[id number], Rx#[id number])          Refill Questions - Documented Responses    Flowsheet Row Most Recent Value   Patient Availability and HIPAA Verification    Does patient want to proceed with activity? Yes   HIPAA/medical authority confirmed? Yes   Relationship to patient of person spoken to? Self   Refill Screening Questions    Changes to allergies? No   Changes to medications? No   New conditions since last clinic visit? No   Unplanned office visit, urgent care, ED, or hospital admission in the last 4 weeks? No   How does patient/caregiver feel medication is working? Excellent   Financial problems or insurance changes? No   How many doses of your specialty medications were missed in the last 4 weeks? 0   Would patient like to speak to a pharmacist? No   When does the patient need to receive the medication? 03/22/22   Refill Delivery Questions    How will the patient receive the medication? Delivery Brenna   When does the patient need to receive the medication? 03/22/22   Shipping Address Home   Address in [facility] confirmed and updated if neccessary? Yes   Expected Copay ($) 75   Is the patient able to afford the medication copay? Yes   Payment Method CC on file   Days supply of Refill 28   Supplies needed? No supplies needed   Refill activity completed? Yes   Refill activity plan Refill scheduled   Shipment/Pickup Date: 03/22/22          Current Outpatient Medications   Medication Sig    aspirin (ECOTRIN) 81 MG EC tablet Take 81 mg by mouth once daily.    calcium carbonate (OS-JURGEN) 500 mg calcium (1,250 mg) tablet Take 1 tablet by mouth once daily.    cycloSPORINE (RESTASIS) 0.05 % ophthalmic emulsion Place 1 drop into both eyes 2 (two) times daily.    esomeprazole (NEXIUM) 40 MG capsule Take 1  capsule (40 mg total) by mouth once daily.    simvastatin (ZOCOR) 40 MG tablet Take 1 tablet (40 mg total) by mouth every evening.    teriparatide (FORTEO) 20 mcg/dose (600mcg/2.4mL) PnIj Inject 0.08 mLs (20 mcg total) into the skin once daily.    vitamin D (VITAMIN D3) 1000 units Tab Take 5,000 Units by mouth once daily.    Last reviewed on 12/14/2021  8:04 AM by Darlin Beltre MA    Review of patient's allergies indicates:   Allergen Reactions    Tetanus vaccines and toxoid Other (See Comments)     Always told he had a reaction to it  Other reaction(s): Unknown    Last reviewed on  12/14/2021 8:03 AM by Darlin Beltre      Tasks added this encounter   4/12/2022 - Refill Call (Auto Added)   Tasks due within next 3 months   No tasks due.     Sima Hall, PharmD  [facility]  [address] [facility] [address]  Phone: [phone]  Fax: [phone]

## 2022-04-12 ENCOUNTER — SPECIALTY PHARMACY (OUTPATIENT)
Dept: PHARMACY | Facility: CLINIC | Age: 72
End: 2022-04-12
Payer: MEDICARE

## 2022-04-12 NOTE — TELEPHONE ENCOUNTER
Outgoing call regarding Forteo refill. Pt requested a call back on Tuesday 4/13/22 to set up refill. PT stated he had enough to last him till possibly Wednesday.

## 2022-04-18 ENCOUNTER — PATIENT MESSAGE (OUTPATIENT)
Dept: ADMINISTRATIVE | Facility: OTHER | Age: 72
End: 2022-04-18
Payer: MEDICARE

## 2022-04-20 NOTE — TELEPHONE ENCOUNTER
Specialty Pharmacy - Refill Coordination    Specialty Medication Orders Linked to Encounter    Flowsheet Row Most Recent Value   Medication #1 teriparatide (FORTEO) 20 mcg/dose (600mcg/2.4mL) PnIj (Order#[id number], Rx#[id number])          Refill Questions - Documented Responses    Flowsheet Row Most Recent Value   Patient Availability and HIPAA Verification    Does patient want to proceed with activity? Yes   HIPAA/medical authority confirmed? Yes   Relationship to patient of person spoken to? Self   Refill Screening Questions    Changes to allergies? No   Changes to medications? No   New conditions since last clinic visit? No   Unplanned office visit, urgent care, ED, or hospital admission in the last 4 weeks? No   How does patient/caregiver feel medication is working? Good   Financial problems or insurance changes? No   How many doses of your specialty medications were missed in the last 4 weeks? 0   Would patient like to speak to a pharmacist? No   When does the patient need to receive the medication? 04/28/22   Refill Delivery Questions    How will the patient receive the medication? Delivery Brenna   When does the patient need to receive the medication? 04/28/22   Shipping Address Home   Address in [facility] confirmed and updated if neccessary? Yes   Expected Copay ($) 75   Is the patient able to afford the medication copay? Yes   Payment Method CC on file   Days supply of Refill 28   Supplies needed? No supplies needed   Refill activity completed? Yes   Refill activity plan Refill scheduled   Shipment/Pickup Date: 04/27/22          Current Outpatient Medications   Medication Sig    aspirin (ECOTRIN) 81 MG EC tablet Take 81 mg by mouth once daily.    calcium carbonate (OS-JURGEN) 500 mg calcium (1,250 mg) tablet Take 1 tablet by mouth once daily.    cycloSPORINE (RESTASIS) 0.05 % ophthalmic emulsion Place 1 drop into both eyes 2 (two) times daily.    esomeprazole (NEXIUM) 40 MG capsule Take 1 capsule  (40 mg total) by mouth once daily.    simvastatin (ZOCOR) 40 MG tablet Take 1 tablet (40 mg total) by mouth every evening.    teriparatide (FORTEO) 20 mcg/dose (600mcg/2.4mL) PnIj Inject 0.08 mLs (20 mcg total) into the skin once daily.    vitamin D (VITAMIN D3) 1000 units Tab Take 5,000 Units by mouth once daily.    Last reviewed on 12/14/2021  8:04 AM by Darlin Beltre MA    Review of patient's allergies indicates:   Allergen Reactions    Tetanus vaccines and toxoid Other (See Comments)     Always told he had a reaction to it  Other reaction(s): Unknown    Last reviewed on  12/14/2021 8:03 AM by Darlin Beltre      Tasks added this encounter   5/19/2022 - Refill Call (Auto Added)   Tasks due within next 3 months   No tasks due.     [facility]  [address] [facility] [address] [address]  Phone: [phone]  Fax: [phone]

## 2022-05-24 ENCOUNTER — PATIENT MESSAGE (OUTPATIENT)
Dept: PHARMACY | Facility: CLINIC | Age: 72
End: 2022-05-24
Payer: MEDICARE

## 2022-05-27 ENCOUNTER — SPECIALTY PHARMACY (OUTPATIENT)
Dept: PHARMACY | Facility: CLINIC | Age: 72
End: 2022-05-27
Payer: MEDICARE

## 2022-05-27 NOTE — TELEPHONE ENCOUNTER
Specialty Pharmacy - Refill Coordination    Specialty Medication Orders Linked to Encounter    Flowsheet Row Most Recent Value   Medication #1 teriparatide (FORTEO) 20 mcg/dose (600mcg/2.4mL) PnIj (Order#[id number], Rx#[id number])          Refill Questions - Documented Responses    Flowsheet Row Most Recent Value   Patient Availability and HIPAA Verification    Does patient want to proceed with activity? Yes   HIPAA/medical authority confirmed? Yes   Relationship to patient of person spoken to? Self   Refill Screening Questions    Changes to allergies? No   Changes to medications? No   New conditions since last clinic visit? No   Unplanned office visit, urgent care, ED, or hospital admission in the last 4 weeks? No   How does patient/caregiver feel medication is working? Good   Financial problems or insurance changes? No   How many doses of your specialty medications were missed in the last 4 weeks? 0   Would patient like to speak to a pharmacist? No   When does the patient need to receive the medication? 05/31/22   Refill Delivery Questions    How will the patient receive the medication? Delivery Brenna   When does the patient need to receive the medication? 05/31/22   Shipping Address Home   Address in [facility] confirmed and updated if neccessary? Yes   Expected Copay ($) 75   Is the patient able to afford the medication copay? Yes   Payment Method CC on file   Days supply of Refill 28   Supplies needed? No supplies needed   Refill activity completed? Yes   Refill activity plan Refill scheduled   Shipment/Pickup Date: 05/31/22          Current Outpatient Medications   Medication Sig    aspirin (ECOTRIN) 81 MG EC tablet Take 81 mg by mouth once daily.    calcium carbonate (OS-JURGEN) 500 mg calcium (1,250 mg) tablet Take 1 tablet by mouth once daily.    cycloSPORINE (RESTASIS) 0.05 % ophthalmic emulsion Place 1 drop into both eyes 2 (two) times daily.    esomeprazole (NEXIUM) 40 MG capsule Take 1 capsule  (40 mg total) by mouth once daily.    simvastatin (ZOCOR) 40 MG tablet Take 1 tablet (40 mg total) by mouth every evening.    teriparatide (FORTEO) 20 mcg/dose (600mcg/2.4mL) PnIj Inject 0.08 mLs (20 mcg total) into the skin once daily.    vitamin D (VITAMIN D3) 1000 units Tab Take 5,000 Units by mouth once daily.    Last reviewed on 12/14/2021  8:04 AM by Darlin Beltre MA    Review of patient's allergies indicates:   Allergen Reactions    Tetanus vaccines and toxoid Other (See Comments)     Always told he had a reaction to it  Other reaction(s): Unknown    Last reviewed on  12/14/2021 8:03 AM by Darlin Beltre      Tasks added this encounter   6/21/2022 - Refill Call (Auto Added)   Tasks due within next 3 months   No tasks due.     Loni Randolph, Patient Care Assistant  [facility]  [address]  [facility] [address]  Phone: [phone]  Fax: [phone]

## 2022-06-03 ENCOUNTER — SPECIALTY PHARMACY (OUTPATIENT)
Dept: PHARMACY | Facility: CLINIC | Age: 72
End: 2022-06-03
Payer: MEDICARE

## 2022-06-03 NOTE — TELEPHONE ENCOUNTER
Patient completed 2 years of Forteo therapy. Spoke with patient to patient to inform that he no longer needs any more refills from [facility]. Closing enrollment. [facility] notified.

## 2022-06-06 ENCOUNTER — TELEPHONE (OUTPATIENT)
Dept: RHEUMATOLOGY | Facility: CLINIC | Age: 72
End: 2022-06-06
Payer: MEDICARE

## 2022-06-06 ENCOUNTER — LAB VISIT (OUTPATIENT)
Dept: LAB | Facility: HOSPITAL | Age: 72
End: 2022-06-06
Attending: INTERNAL MEDICINE
Payer: MEDICARE

## 2022-06-06 DIAGNOSIS — M81.0 OSTEOPOROSIS, UNSPECIFIED OSTEOPOROSIS TYPE, UNSPECIFIED PATHOLOGICAL FRACTURE PRESENCE: ICD-10-CM

## 2022-06-06 DIAGNOSIS — M81.0 AGE-RELATED OSTEOPOROSIS WITHOUT CURRENT PATHOLOGICAL FRACTURE: ICD-10-CM

## 2022-06-06 LAB
25(OH)D3+25(OH)D2 SERPL-MCNC: 65 NG/ML (ref 30–96)
ALBUMIN SERPL BCP-MCNC: 4.2 G/DL (ref 3.5–5.2)
ALBUMIN SERPL BCP-MCNC: 4.2 G/DL (ref 3.5–5.2)
ALP SERPL-CCNC: 62 U/L (ref 55–135)
ALP SERPL-CCNC: 62 U/L (ref 55–135)
ALT SERPL W/O P-5'-P-CCNC: 13 U/L (ref 10–44)
ALT SERPL W/O P-5'-P-CCNC: 13 U/L (ref 10–44)
ANION GAP SERPL CALC-SCNC: 10 MMOL/L (ref 8–16)
ANION GAP SERPL CALC-SCNC: 10 MMOL/L (ref 8–16)
AST SERPL-CCNC: 18 U/L (ref 10–40)
AST SERPL-CCNC: 18 U/L (ref 10–40)
BILIRUB SERPL-MCNC: 0.5 MG/DL (ref 0.1–1)
BILIRUB SERPL-MCNC: 0.5 MG/DL (ref 0.1–1)
BUN SERPL-MCNC: 19 MG/DL (ref 8–23)
BUN SERPL-MCNC: 19 MG/DL (ref 8–23)
CALCIUM SERPL-MCNC: 9.5 MG/DL (ref 8.7–10.5)
CALCIUM SERPL-MCNC: 9.5 MG/DL (ref 8.7–10.5)
CHLORIDE SERPL-SCNC: 107 MMOL/L (ref 95–110)
CHLORIDE SERPL-SCNC: 107 MMOL/L (ref 95–110)
CO2 SERPL-SCNC: 27 MMOL/L (ref 23–29)
CO2 SERPL-SCNC: 27 MMOL/L (ref 23–29)
CREAT SERPL-MCNC: 0.8 MG/DL (ref 0.5–1.4)
CREAT SERPL-MCNC: 0.8 MG/DL (ref 0.5–1.4)
EST. GFR  (AFRICAN AMERICAN): >60 ML/MIN/1.73 M^2
EST. GFR  (AFRICAN AMERICAN): >60 ML/MIN/1.73 M^2
EST. GFR  (NON AFRICAN AMERICAN): >60 ML/MIN/1.73 M^2
EST. GFR  (NON AFRICAN AMERICAN): >60 ML/MIN/1.73 M^2
GLUCOSE SERPL-MCNC: 80 MG/DL (ref 70–110)
GLUCOSE SERPL-MCNC: 80 MG/DL (ref 70–110)
POTASSIUM SERPL-SCNC: 4.2 MMOL/L (ref 3.5–5.1)
POTASSIUM SERPL-SCNC: 4.2 MMOL/L (ref 3.5–5.1)
PROT SERPL-MCNC: 7.1 G/DL (ref 6–8.4)
PROT SERPL-MCNC: 7.1 G/DL (ref 6–8.4)
SODIUM SERPL-SCNC: 144 MMOL/L (ref 136–145)
SODIUM SERPL-SCNC: 144 MMOL/L (ref 136–145)

## 2022-06-06 PROCEDURE — 80053 COMPREHEN METABOLIC PANEL: CPT | Performed by: INTERNAL MEDICINE

## 2022-06-06 PROCEDURE — 82306 VITAMIN D 25 HYDROXY: CPT | Performed by: INTERNAL MEDICINE

## 2022-06-06 PROCEDURE — 36415 COLL VENOUS BLD VENIPUNCTURE: CPT | Performed by: INTERNAL MEDICINE

## 2022-06-06 NOTE — TELEPHONE ENCOUNTER
----- Message from Harshal Samuels PharmD sent at 6/3/2022  2:57 PM CDT -----  Regarding: Forteo Therapy Completion  Good morning,    [facility] has dispensed the patient's last Forteo refill. After he finishes this month, he will be completed with his 2 years of therapy. Just wanted to make you aware that [facility] is closing the patient's enrollment at [facility].     Thank you,    Harshal Samuels, Mary  Clinical Pharmacist   [facility]   P: [phone]

## 2022-06-07 ENCOUNTER — TELEPHONE (OUTPATIENT)
Dept: RHEUMATOLOGY | Facility: CLINIC | Age: 72
End: 2022-06-07
Payer: MEDICARE

## 2022-06-07 NOTE — TELEPHONE ENCOUNTER
----- Message from Nicholas Brunson MD sent at 6/7/2022 10:52 AM CDT -----  Regarding: FW: Forteo Therapy Completion  Planning on resuming Prolia after completing course of Forteo, will discuss in more detail on upcoming encounter.    ----- Message -----  From: Harshal Samuels PharmD  Sent: 6/3/2022   3:42 PM CDT  To: Nicholas Brunson MD, [facility] Staff  Subject: Forteo Therapy Completion                        Good morning,    [facility] has dispensed the patient's last Forteo refill. After he finishes this month, he will be completed with his 2 years of therapy. Just wanted to make you aware that [facility] is closing the patient's enrollment at [facility].     Thank you,    Harshal Samuels, PharmD  Clinical Pharmacist   [facility]   P: [phone]

## 2022-06-23 ENCOUNTER — OFFICE VISIT (OUTPATIENT)
Dept: RHEUMATOLOGY | Facility: CLINIC | Age: 72
End: 2022-06-23
Payer: MEDICARE

## 2022-06-23 VITALS
WEIGHT: 157.88 LBS | HEART RATE: 57 BPM | HEIGHT: 74 IN | SYSTOLIC BLOOD PRESSURE: 134 MMHG | BODY MASS INDEX: 20.26 KG/M2 | DIASTOLIC BLOOD PRESSURE: 78 MMHG

## 2022-06-23 DIAGNOSIS — M81.0 OSTEOPOROSIS, UNSPECIFIED OSTEOPOROSIS TYPE, UNSPECIFIED PATHOLOGICAL FRACTURE PRESENCE: Primary | ICD-10-CM

## 2022-06-23 DIAGNOSIS — Z71.89 COUNSELING ON HEALTH PROMOTION AND DISEASE PREVENTION: ICD-10-CM

## 2022-06-23 PROCEDURE — 99999 PR PBB SHADOW E&M-EST. PATIENT-LVL III: CPT | Mod: PBBFAC,,, | Performed by: INTERNAL MEDICINE

## 2022-06-23 PROCEDURE — 99214 OFFICE O/P EST MOD 30 MIN: CPT | Mod: S$GLB,,, | Performed by: INTERNAL MEDICINE

## 2022-06-23 PROCEDURE — 3075F SYST BP GE 130 - 139MM HG: CPT | Mod: CPTII,S$GLB,, | Performed by: INTERNAL MEDICINE

## 2022-06-23 PROCEDURE — 99999 PR PBB SHADOW E&M-EST. PATIENT-LVL III: ICD-10-PCS | Mod: PBBFAC,,, | Performed by: INTERNAL MEDICINE

## 2022-06-23 PROCEDURE — 3288F FALL RISK ASSESSMENT DOCD: CPT | Mod: CPTII,S$GLB,, | Performed by: INTERNAL MEDICINE

## 2022-06-23 PROCEDURE — 1157F ADVNC CARE PLAN IN RCRD: CPT | Mod: CPTII,S$GLB,, | Performed by: INTERNAL MEDICINE

## 2022-06-23 PROCEDURE — 1159F MED LIST DOCD IN RCRD: CPT | Mod: CPTII,S$GLB,, | Performed by: INTERNAL MEDICINE

## 2022-06-23 PROCEDURE — 1159F PR MEDICATION LIST DOCUMENTED IN MEDICAL RECORD: ICD-10-PCS | Mod: CPTII,S$GLB,, | Performed by: INTERNAL MEDICINE

## 2022-06-23 PROCEDURE — 3078F PR MOST RECENT DIASTOLIC BLOOD PRESSURE < 80 MM HG: ICD-10-PCS | Mod: CPTII,S$GLB,, | Performed by: INTERNAL MEDICINE

## 2022-06-23 PROCEDURE — 1101F PR PT FALLS ASSESS DOC 0-1 FALLS W/OUT INJ PAST YR: ICD-10-PCS | Mod: CPTII,S$GLB,, | Performed by: INTERNAL MEDICINE

## 2022-06-23 PROCEDURE — 99214 PR OFFICE/OUTPT VISIT, EST, LEVL IV, 30-39 MIN: ICD-10-PCS | Mod: S$GLB,,, | Performed by: INTERNAL MEDICINE

## 2022-06-23 PROCEDURE — 1157F PR ADVANCE CARE PLAN OR EQUIV PRESENT IN MEDICAL RECORD: ICD-10-PCS | Mod: CPTII,S$GLB,, | Performed by: INTERNAL MEDICINE

## 2022-06-23 PROCEDURE — 3008F BODY MASS INDEX DOCD: CPT | Mod: CPTII,S$GLB,, | Performed by: INTERNAL MEDICINE

## 2022-06-23 PROCEDURE — 1125F AMNT PAIN NOTED PAIN PRSNT: CPT | Mod: CPTII,S$GLB,, | Performed by: INTERNAL MEDICINE

## 2022-06-23 PROCEDURE — 1101F PT FALLS ASSESS-DOCD LE1/YR: CPT | Mod: CPTII,S$GLB,, | Performed by: INTERNAL MEDICINE

## 2022-06-23 PROCEDURE — 3008F PR BODY MASS INDEX (BMI) DOCUMENTED: ICD-10-PCS | Mod: CPTII,S$GLB,, | Performed by: INTERNAL MEDICINE

## 2022-06-23 PROCEDURE — 1125F PR PAIN SEVERITY QUANTIFIED, PAIN PRESENT: ICD-10-PCS | Mod: CPTII,S$GLB,, | Performed by: INTERNAL MEDICINE

## 2022-06-23 PROCEDURE — 3288F PR FALLS RISK ASSESSMENT DOCUMENTED: ICD-10-PCS | Mod: CPTII,S$GLB,, | Performed by: INTERNAL MEDICINE

## 2022-06-23 PROCEDURE — 3078F DIAST BP <80 MM HG: CPT | Mod: CPTII,S$GLB,, | Performed by: INTERNAL MEDICINE

## 2022-06-23 PROCEDURE — 3075F PR MOST RECENT SYSTOLIC BLOOD PRESS GE 130-139MM HG: ICD-10-PCS | Mod: CPTII,S$GLB,, | Performed by: INTERNAL MEDICINE

## 2022-06-23 NOTE — PROGRESS NOTES
RHEUMATOLOGY OUTPATIENT CLINIC NOTE    6/23/2022    Attending Rheumatologist: Nicholas Brunson  Primary Care Provider/Physician Requesting Consultation: Leonardo Khan MD   Chief Complaint/Reason For Consultation:  Osteoporosis and DDD      Subjective:     Brett Nugent is a 72 y.o. White male with osteoporosis for follow up.    No acute complaints.  Denies falls or fractures since last encounter.  Currently not planned for invasive dental procedures.    Review of Systems   Constitutional: Negative for fever.   Musculoskeletal: Negative for falls and joint pain.   Skin: Negative for rash.   Neurological: Negative for focal weakness.       Chronic comorbid conditions affecting medical decision making today:  Past Medical History:   Diagnosis Date    Anxiety     Enlarged prostate     General anesthetics causing adverse effect in therapeutic use     Slow to wake    GERD (gastroesophageal reflux disease)     [place] (hard of hearing)     has hearing aids    Hyperlipidemia     Osteoporosis     White coat syndrome without diagnosis of hypertension      Past Surgical History:   Procedure Laterality Date    FUSION OF POSTERIOR COLUMN OF CERVICAL SPINE USING COMPUTER AIDED NAVIGATION Bilateral 3/4/2019    Procedure: FUSION, SPINE, POSTERIOR SPINAL COLUMN, CERVICAL, USING COMPUTER-ASSISTED NAVIGATION;  Surgeon: Helio Wolfe MD;  Location: [facility] OR;  Service: Neurosurgery;  Laterality: Bilateral;  C3-5    PROSTATE SURGERY      REPAIR OF CEREBROSPINAL FLUID LEAK OF SPINE Bilateral 3/14/2019    Procedure: REPAIR, CSF LEAK, SPINE;  Surgeon: Helio Wolfe MD;  Location: [facility] OR;  Service: Neurosurgery;  Laterality: Bilateral;    SINUS SURGERY      SPINE SURGERY       History reviewed. No pertinent family history.  Social History     Substance and Sexual Activity   Alcohol Use No     Social History     Tobacco Use   Smoking Status Never Smoker   Smokeless Tobacco Never Used     Social History     Substance and  Sexual Activity   Drug Use No       Current Outpatient Medications:     aspirin (ECOTRIN) 81 MG EC tablet, Take 81 mg by mouth once daily., Disp: , Rfl:     calcium carbonate (OS-JURGEN) 500 mg calcium (1,250 mg) tablet, Take 1 tablet by mouth once daily., Disp: , Rfl:     cycloSPORINE (RESTASIS) 0.05 % ophthalmic emulsion, Place 1 drop into both eyes 2 (two) times daily., Disp: , Rfl:     esomeprazole (NEXIUM) 40 MG capsule, Take 1 capsule (40 mg total) by mouth once daily., Disp: 30 capsule, Rfl: 11    simvastatin (ZOCOR) 40 MG tablet, Take 1 tablet (40 mg total) by mouth every evening., Disp: 30 tablet, Rfl: 11    teriparatide (FORTEO) 20 mcg/dose (600mcg/2.4mL) PnIj, Inject 0.08 mLs (20 mcg total) into the skin once daily., Disp: 28.8 mL, Rfl: 1    vitamin D (VITAMIN D3) 1000 units Tab, Take 5,000 Units by mouth once daily. , Disp: , Rfl:   No current facility-administered medications for this visit.    Facility-Administered Medications Ordered in Other Visits:     chlorhexidine 0.12 % solution 10 mL, 10 mL, Mouth/Throat, On Call Procedure, Kathy Elizabeth PA-C    lidocaine (PF) 10 mg/ml (1%) injection 10 mg, 1 mL, Intradermal, Once, Homer Johnson DO    nozaseptin (NOZIN) nasal , , Each Nostril, On Call Procedure, Kathy Elizabeth PA-C, Given at 03/04/19 0826     Objective:     Vitals:    06/23/22 0959   BP: 134/78   Pulse: (!) 57     Physical Exam   Eyes: Conjunctivae are normal.   Pulmonary/Chest: Effort normal. No respiratory distress.   Musculoskeletal:         General: No swelling. Normal range of motion.   Skin: No rash noted.       Reviewed available old and all outside pertinent medical records available.    All lab results personally reviewed and interpreted by me.  Lab Results   Component Value Date    WBC 6.6 11/01/2021    HGB 12.9 (L) 11/01/2021    HCT 41.2 11/01/2021    MCV 88 11/01/2021    RDW 14.4 11/01/2021     11/01/2021    NEUTROABS 4.7 11/01/2021       Lab  Results   Component Value Date     06/06/2022     06/06/2022    K 4.2 06/06/2022    K 4.2 06/06/2022     06/06/2022     06/06/2022    CO2 27 06/06/2022    CO2 27 06/06/2022    GLU 80 06/06/2022    GLU 80 06/06/2022    BUN 19 06/06/2022    BUN 19 06/06/2022    CALCIUM 9.5 06/06/2022    CALCIUM 9.5 06/06/2022    PROT 7.1 06/06/2022    PROT 7.1 06/06/2022    ALBUMIN 4.2 06/06/2022    ALBUMIN 4.2 06/06/2022    BILITOT 0.5 06/06/2022    BILITOT 0.5 06/06/2022    AST 18 06/06/2022    AST 18 06/06/2022    ALKPHOS 62 06/06/2022    ALKPHOS 62 06/06/2022    ALT 13 06/06/2022    ALT 13 06/06/2022       Lab Results   Component Value Date    COLORU Yellow 03/04/2019    APPEARANCEUA Clear 03/04/2019    SPECGRAV 1.025 03/04/2019    PHUR 5.0 03/04/2019    PROTEINUA Negative 03/04/2019    KETONESU Negative 03/04/2019    LEUKOCYTESUR Negative 03/04/2019    NITRITE Negative 03/04/2019    UROBILINOGEN Negative 03/04/2019       No results found for: PTH    No results found for: URICACID    No results found for: CRP, ERYTHROCYTES    No results found for: ANATITER    No components found for: TSPOTTB,  QUANTIFERON     ASSESSMENT:     Osteoporosis per densitometry criteria for follow-up visit.  Inadequate response and musculoskeletal symptoms with bisphosphonates, switched to Prolia.  Placed on Forteo after in-adequate response to denosumab.  Will complete 2 year course of bone anabolic therapy by the end of the week.  Recommend to resume Prolia 1 month after last Forteo shot to keep gains achieved with therapy.  Continue adequate calcium and vitamin-D dietary intake, may continue with supplementation.  Avoid immobility, fall prevention.    PLAN:     1. Osteoporosis    2. Other specified counseling      Disclaimer: This note is prepared using voice recognition software and as such is likely to have errors and has not been proof read. Please contact me for questions.         Nicholas Brunson M.D.

## 2022-07-20 ENCOUNTER — LAB VISIT (OUTPATIENT)
Dept: LAB | Facility: HOSPITAL | Age: 72
End: 2022-07-20
Attending: INTERNAL MEDICINE
Payer: MEDICARE

## 2022-07-20 DIAGNOSIS — M81.0 OSTEOPOROSIS, UNSPECIFIED OSTEOPOROSIS TYPE, UNSPECIFIED PATHOLOGICAL FRACTURE PRESENCE: ICD-10-CM

## 2022-07-20 PROCEDURE — 36415 COLL VENOUS BLD VENIPUNCTURE: CPT | Mod: PO | Performed by: INTERNAL MEDICINE

## 2022-07-20 PROCEDURE — 80053 COMPREHEN METABOLIC PANEL: CPT | Performed by: INTERNAL MEDICINE

## 2022-07-21 LAB
ALBUMIN SERPL BCP-MCNC: 3.8 G/DL (ref 3.5–5.2)
ALP SERPL-CCNC: 52 U/L (ref 55–135)
ALT SERPL W/O P-5'-P-CCNC: 10 U/L (ref 10–44)
ANION GAP SERPL CALC-SCNC: 8 MMOL/L (ref 8–16)
AST SERPL-CCNC: 18 U/L (ref 10–40)
BILIRUB SERPL-MCNC: 0.5 MG/DL (ref 0.1–1)
BUN SERPL-MCNC: 22 MG/DL (ref 8–23)
CALCIUM SERPL-MCNC: 9.3 MG/DL (ref 8.7–10.5)
CHLORIDE SERPL-SCNC: 108 MMOL/L (ref 95–110)
CO2 SERPL-SCNC: 26 MMOL/L (ref 23–29)
CREAT SERPL-MCNC: 0.9 MG/DL (ref 0.5–1.4)
EST. GFR  (AFRICAN AMERICAN): >60 ML/MIN/1.73 M^2
EST. GFR  (NON AFRICAN AMERICAN): >60 ML/MIN/1.73 M^2
GLUCOSE SERPL-MCNC: 75 MG/DL (ref 70–110)
POTASSIUM SERPL-SCNC: 4.3 MMOL/L (ref 3.5–5.1)
PROT SERPL-MCNC: 6.8 G/DL (ref 6–8.4)
SODIUM SERPL-SCNC: 142 MMOL/L (ref 136–145)

## 2022-07-27 ENCOUNTER — INFUSION (OUTPATIENT)
Dept: INFUSION THERAPY | Facility: HOSPITAL | Age: 72
End: 2022-07-27
Attending: INTERNAL MEDICINE
Payer: MEDICARE

## 2022-07-27 VITALS
HEART RATE: 60 BPM | DIASTOLIC BLOOD PRESSURE: 70 MMHG | OXYGEN SATURATION: 98 % | TEMPERATURE: 98 F | SYSTOLIC BLOOD PRESSURE: 155 MMHG | RESPIRATION RATE: 16 BRPM

## 2022-07-27 DIAGNOSIS — M81.0 OSTEOPOROSIS, UNSPECIFIED OSTEOPOROSIS TYPE, UNSPECIFIED PATHOLOGICAL FRACTURE PRESENCE: Primary | ICD-10-CM

## 2022-07-27 PROCEDURE — 63600175 PHARM REV CODE 636 W HCPCS: Mod: JG | Performed by: INTERNAL MEDICINE

## 2022-07-27 PROCEDURE — 96372 THER/PROPH/DIAG INJ SC/IM: CPT

## 2022-07-27 RX ADMIN — DENOSUMAB 60 MG: 60 INJECTION SUBCUTANEOUS at 09:07

## 2023-01-25 ENCOUNTER — LAB VISIT (OUTPATIENT)
Dept: LAB | Facility: HOSPITAL | Age: 73
End: 2023-01-25
Attending: INTERNAL MEDICINE
Payer: MEDICARE

## 2023-01-25 DIAGNOSIS — M81.0 OSTEOPOROSIS, UNSPECIFIED OSTEOPOROSIS TYPE, UNSPECIFIED PATHOLOGICAL FRACTURE PRESENCE: ICD-10-CM

## 2023-01-25 LAB
ALBUMIN SERPL BCP-MCNC: 3.9 G/DL (ref 3.5–5.2)
ALP SERPL-CCNC: 45 U/L (ref 55–135)
ALT SERPL W/O P-5'-P-CCNC: 11 U/L (ref 10–44)
ANION GAP SERPL CALC-SCNC: 12 MMOL/L (ref 8–16)
AST SERPL-CCNC: 20 U/L (ref 10–40)
BILIRUB SERPL-MCNC: 0.5 MG/DL (ref 0.1–1)
BUN SERPL-MCNC: 22 MG/DL (ref 8–23)
CALCIUM SERPL-MCNC: 9.6 MG/DL (ref 8.7–10.5)
CHLORIDE SERPL-SCNC: 106 MMOL/L (ref 95–110)
CO2 SERPL-SCNC: 25 MMOL/L (ref 23–29)
CREAT SERPL-MCNC: 0.9 MG/DL (ref 0.5–1.4)
EST. GFR  (NO RACE VARIABLE): >60 ML/MIN/1.73 M^2
GLUCOSE SERPL-MCNC: 72 MG/DL (ref 70–110)
POTASSIUM SERPL-SCNC: 4.5 MMOL/L (ref 3.5–5.1)
PROT SERPL-MCNC: 7.1 G/DL (ref 6–8.4)
SODIUM SERPL-SCNC: 143 MMOL/L (ref 136–145)

## 2023-01-25 PROCEDURE — 36415 COLL VENOUS BLD VENIPUNCTURE: CPT | Mod: PO | Performed by: INTERNAL MEDICINE

## 2023-01-25 PROCEDURE — 80053 COMPREHEN METABOLIC PANEL: CPT | Performed by: INTERNAL MEDICINE

## 2023-02-01 ENCOUNTER — INFUSION (OUTPATIENT)
Dept: INFUSION THERAPY | Facility: HOSPITAL | Age: 73
End: 2023-02-01
Attending: INTERNAL MEDICINE
Payer: MEDICARE

## 2023-02-01 ENCOUNTER — OFFICE VISIT (OUTPATIENT)
Dept: RHEUMATOLOGY | Facility: CLINIC | Age: 73
End: 2023-02-01
Payer: MEDICARE

## 2023-02-01 VITALS
HEART RATE: 53 BPM | RESPIRATION RATE: 18 BRPM | TEMPERATURE: 98 F | DIASTOLIC BLOOD PRESSURE: 82 MMHG | SYSTOLIC BLOOD PRESSURE: 149 MMHG | OXYGEN SATURATION: 97 %

## 2023-02-01 VITALS
SYSTOLIC BLOOD PRESSURE: 149 MMHG | BODY MASS INDEX: 20.49 KG/M2 | DIASTOLIC BLOOD PRESSURE: 82 MMHG | WEIGHT: 159.63 LBS | HEIGHT: 74 IN | HEART RATE: 53 BPM

## 2023-02-01 DIAGNOSIS — M15.9 PRIMARY OSTEOARTHRITIS INVOLVING MULTIPLE JOINTS: ICD-10-CM

## 2023-02-01 DIAGNOSIS — M54.50 CHRONIC LOW BACK PAIN, UNSPECIFIED BACK PAIN LATERALITY, UNSPECIFIED WHETHER SCIATICA PRESENT: ICD-10-CM

## 2023-02-01 DIAGNOSIS — M81.0 OSTEOPOROSIS, UNSPECIFIED OSTEOPOROSIS TYPE, UNSPECIFIED PATHOLOGICAL FRACTURE PRESENCE: Primary | ICD-10-CM

## 2023-02-01 DIAGNOSIS — M48.061 SPINAL STENOSIS OF LUMBAR REGION, UNSPECIFIED WHETHER NEUROGENIC CLAUDICATION PRESENT: ICD-10-CM

## 2023-02-01 DIAGNOSIS — G89.29 CHRONIC LOW BACK PAIN, UNSPECIFIED BACK PAIN LATERALITY, UNSPECIFIED WHETHER SCIATICA PRESENT: ICD-10-CM

## 2023-02-01 DIAGNOSIS — Z51.81 MEDICATION MONITORING ENCOUNTER: ICD-10-CM

## 2023-02-01 DIAGNOSIS — E55.9 VITAMIN D INSUFFICIENCY: ICD-10-CM

## 2023-02-01 PROCEDURE — 99999 PR PBB SHADOW E&M-EST. PATIENT-LVL III: CPT | Mod: PBBFAC,,, | Performed by: INTERNAL MEDICINE

## 2023-02-01 PROCEDURE — 99214 OFFICE O/P EST MOD 30 MIN: CPT | Mod: S$GLB,,, | Performed by: INTERNAL MEDICINE

## 2023-02-01 PROCEDURE — 3077F SYST BP >= 140 MM HG: CPT | Mod: CPTII,S$GLB,, | Performed by: INTERNAL MEDICINE

## 2023-02-01 PROCEDURE — 1125F AMNT PAIN NOTED PAIN PRSNT: CPT | Mod: CPTII,S$GLB,, | Performed by: INTERNAL MEDICINE

## 2023-02-01 PROCEDURE — 99214 PR OFFICE/OUTPT VISIT, EST, LEVL IV, 30-39 MIN: ICD-10-PCS | Mod: S$GLB,,, | Performed by: INTERNAL MEDICINE

## 2023-02-01 PROCEDURE — 1159F PR MEDICATION LIST DOCUMENTED IN MEDICAL RECORD: ICD-10-PCS | Mod: CPTII,S$GLB,, | Performed by: INTERNAL MEDICINE

## 2023-02-01 PROCEDURE — 99999 PR PBB SHADOW E&M-EST. PATIENT-LVL III: ICD-10-PCS | Mod: PBBFAC,,, | Performed by: INTERNAL MEDICINE

## 2023-02-01 PROCEDURE — 1157F ADVNC CARE PLAN IN RCRD: CPT | Mod: CPTII,S$GLB,, | Performed by: INTERNAL MEDICINE

## 2023-02-01 PROCEDURE — 3077F PR MOST RECENT SYSTOLIC BLOOD PRESSURE >= 140 MM HG: ICD-10-PCS | Mod: CPTII,S$GLB,, | Performed by: INTERNAL MEDICINE

## 2023-02-01 PROCEDURE — 1157F PR ADVANCE CARE PLAN OR EQUIV PRESENT IN MEDICAL RECORD: ICD-10-PCS | Mod: CPTII,S$GLB,, | Performed by: INTERNAL MEDICINE

## 2023-02-01 PROCEDURE — 3288F FALL RISK ASSESSMENT DOCD: CPT | Mod: CPTII,S$GLB,, | Performed by: INTERNAL MEDICINE

## 2023-02-01 PROCEDURE — 1101F PR PT FALLS ASSESS DOC 0-1 FALLS W/OUT INJ PAST YR: ICD-10-PCS | Mod: CPTII,S$GLB,, | Performed by: INTERNAL MEDICINE

## 2023-02-01 PROCEDURE — 3288F PR FALLS RISK ASSESSMENT DOCUMENTED: ICD-10-PCS | Mod: CPTII,S$GLB,, | Performed by: INTERNAL MEDICINE

## 2023-02-01 PROCEDURE — 3079F PR MOST RECENT DIASTOLIC BLOOD PRESSURE 80-89 MM HG: ICD-10-PCS | Mod: CPTII,S$GLB,, | Performed by: INTERNAL MEDICINE

## 2023-02-01 PROCEDURE — 3008F BODY MASS INDEX DOCD: CPT | Mod: CPTII,S$GLB,, | Performed by: INTERNAL MEDICINE

## 2023-02-01 PROCEDURE — 1101F PT FALLS ASSESS-DOCD LE1/YR: CPT | Mod: CPTII,S$GLB,, | Performed by: INTERNAL MEDICINE

## 2023-02-01 PROCEDURE — 1125F PR PAIN SEVERITY QUANTIFIED, PAIN PRESENT: ICD-10-PCS | Mod: CPTII,S$GLB,, | Performed by: INTERNAL MEDICINE

## 2023-02-01 PROCEDURE — 96372 THER/PROPH/DIAG INJ SC/IM: CPT

## 2023-02-01 PROCEDURE — 1159F MED LIST DOCD IN RCRD: CPT | Mod: CPTII,S$GLB,, | Performed by: INTERNAL MEDICINE

## 2023-02-01 PROCEDURE — 3079F DIAST BP 80-89 MM HG: CPT | Mod: CPTII,S$GLB,, | Performed by: INTERNAL MEDICINE

## 2023-02-01 PROCEDURE — 3008F PR BODY MASS INDEX (BMI) DOCUMENTED: ICD-10-PCS | Mod: CPTII,S$GLB,, | Performed by: INTERNAL MEDICINE

## 2023-02-01 PROCEDURE — 63600175 PHARM REV CODE 636 W HCPCS: Mod: JG | Performed by: INTERNAL MEDICINE

## 2023-02-01 RX ORDER — CYCLOBENZAPRINE HCL 10 MG
10 TABLET ORAL NIGHTLY
Qty: 30 TABLET | Refills: 0 | Status: SHIPPED | OUTPATIENT
Start: 2023-02-01 | End: 2023-02-11

## 2023-02-01 RX ORDER — ASCORBIC ACID 500 MG
1 TABLET ORAL DAILY
COMMUNITY
Start: 2019-10-01

## 2023-02-01 RX ADMIN — DENOSUMAB 60 MG: 60 INJECTION SUBCUTANEOUS at 10:02

## 2023-02-01 NOTE — DISCHARGE INSTRUCTIONS
Remember to take your calcium with vitamin D supplement as directed.   It is not advisable to undergo invasive dental procedure, within 3 months of your injection, unless approved by your treating provider.

## 2023-02-01 NOTE — PROGRESS NOTES
RHEUMATOLOGY OUTPATIENT CLINIC NOTE    2/1/2023    Attending Rheumatologist: Nicholas Brunson  Primary Care Provider/Physician Requesting Consultation: Leonardo Khan MD   Chief Complaint/Reason For Consultation:  Osteoporosis and DDD      Subjective:     Brett Nugent is a 72 y.o. White male with OP for f/u.    No acute complaints.    Review of Systems   Constitutional:  Negative for fever.   Genitourinary:  Negative for dysuria and urgency.   Musculoskeletal:  Positive for back pain. Negative for falls and joint pain.   Skin:  Negative for rash.   Neurological:  Negative for tingling, focal weakness and weakness.     Chronic comorbid conditions affecting medical decision making today:  Past Medical History:   Diagnosis Date    Anxiety     Enlarged prostate     General anesthetics causing adverse effect in therapeutic use     Slow to wake    GERD (gastroesophageal reflux disease)     [place] (hard of hearing)     has hearing aids    Hyperlipidemia     Osteoporosis     White coat syndrome without diagnosis of hypertension      Past Surgical History:   Procedure Laterality Date    FUSION OF POSTERIOR COLUMN OF CERVICAL SPINE USING COMPUTER AIDED NAVIGATION Bilateral 3/4/2019    Procedure: FUSION, SPINE, POSTERIOR SPINAL COLUMN, CERVICAL, USING COMPUTER-ASSISTED NAVIGATION;  Surgeon: Helio Wolfe MD;  Location: [facility] OR;  Service: Neurosurgery;  Laterality: Bilateral;  C3-5    PROSTATE SURGERY      REPAIR OF CEREBROSPINAL FLUID LEAK OF SPINE Bilateral 3/14/2019    Procedure: REPAIR, CSF LEAK, SPINE;  Surgeon: Helio Wolfe MD;  Location: [facility] OR;  Service: Neurosurgery;  Laterality: Bilateral;    SINUS SURGERY      SPINE SURGERY       History reviewed. No pertinent family history.  Social History     Tobacco Use   Smoking Status Never   Smokeless Tobacco Never       Current Outpatient Medications:     ascorbic acid, vitamin C, (VITAMIN C) 500 MG tablet, Take 1 tablet by mouth once daily., Disp: , Rfl:      aspirin (ECOTRIN) 81 MG EC tablet, Take 81 mg by mouth once daily., Disp: , Rfl:     calcium carbonate (OS-JURGEN) 500 mg calcium (1,250 mg) tablet, Take 1 tablet by mouth once daily., Disp: , Rfl:     cycloSPORINE (RESTASIS) 0.05 % ophthalmic emulsion, Place 1 drop into both eyes 2 (two) times daily., Disp: , Rfl:     denosumab (PROLIA) 60 mg/mL Syrg, Inject 60 mg into the skin., Disp: , Rfl:     esomeprazole (NEXIUM) 40 MG capsule, Take 1 capsule (40 mg total) by mouth once daily., Disp: 30 capsule, Rfl: 11    simvastatin (ZOCOR) 40 MG tablet, Take 1 tablet (40 mg total) by mouth every evening., Disp: 30 tablet, Rfl: 11    vitamin D (VITAMIN D3) 1000 units Tab, Take 5,000 Units by mouth once daily. , Disp: , Rfl:     zinc 50 mg Tab, Take 1 tablet by mouth once daily., Disp: , Rfl:   No current facility-administered medications for this visit.    Facility-Administered Medications Ordered in Other Visits:     chlorhexidine 0.12 % solution 10 mL, 10 mL, Mouth/Throat, On Call Procedure, Kathy Elizabeth PA-C    lidocaine (PF) 10 mg/ml (1%) injection 10 mg, 1 mL, Intradermal, Once, Homer Johnson DO    nozaseptin (NOZIN) nasal , , Each Nostril, On Call Procedure, Kathy Elizabeth PA-C, Given at 03/04/19 0826     Objective:     Vitals:    02/01/23 0936   BP: (!) 149/82   Pulse: (!) 53     Physical Exam   Eyes: Conjunctivae are normal.   Pulmonary/Chest: Effort normal. No respiratory distress.   Musculoskeletal:         General: No swelling or tenderness. Normal range of motion.   Neurological: He displays no weakness.   Skin: No rash noted.     Reviewed available old and all outside pertinent medical records available.    All lab results personally reviewed and interpreted by me.       ASSESSMENT / PLAN       1. Osteoporosis, unspecified osteoporosis type, unspecified pathological fracture presence  - c/w Prolia q 6 months  - cyclobenzaprine (FLEXERIL) 10 MG tablet; Take 1 tablet (10 mg total) by mouth  every evening. for 10 days  Dispense: 30 tablet; Refill: 0  - Vitamin D; Standing  - Comprehensive Metabolic Panel; Standing    2. Chronic low back pain, unspecified back pain laterality, unspecified whether sciatica present  - repeat XR to monitor for compression deformities  - cyclobenzaprine (FLEXERIL) 10 MG tablet; Take 1 tablet (10 mg total) by mouth every evening. for 10 days  Dispense: 30 tablet; Refill: 0    3. Spinal stenosis of lumbar region, unspecified whether neurogenic claudication present      4. Primary osteoarthritis involving multiple joints    - cyclobenzaprine (FLEXERIL) 10 MG tablet; Take 1 tablet (10 mg total) by mouth every evening. for 10 days  Dispense: 30 tablet; Refill: 0    5. Vitamin D insufficiency    - cyclobenzaprine (FLEXERIL) 10 MG tablet; Take 1 tablet (10 mg total) by mouth every evening. for 10 days  Dispense: 30 tablet; Refill: 0  - Vitamin D; Standing  - Comprehensive Metabolic Panel; Standing    6. Medication monitoring encounter    - Comprehensive Metabolic Panel; Standing      Disclaimer: This note is prepared using voice recognition software and as such is likely to have errors and has not been proof read. Please contact me for questions.       Nicholas Brunson M.D.

## 2023-02-02 ENCOUNTER — HOSPITAL ENCOUNTER (OUTPATIENT)
Dept: RADIOLOGY | Facility: HOSPITAL | Age: 73
Discharge: HOME | End: 2023-02-02
Attending: INTERNAL MEDICINE
Payer: MEDICARE

## 2023-02-02 ENCOUNTER — HOSPITAL ENCOUNTER (OUTPATIENT)
Dept: RADIOLOGY | Facility: HOSPITAL | Age: 73
Discharge: HOME | End: 2023-02-02
Attending: FAMILY MEDICINE
Payer: MEDICARE

## 2023-02-02 ENCOUNTER — CLINICAL SUPPORT (OUTPATIENT)
Dept: CARDIOLOGY | Facility: CLINIC | Age: 73
End: 2023-02-02
Payer: MEDICARE

## 2023-02-02 DIAGNOSIS — Z01.810 PREOP CARDIOVASCULAR EXAM: ICD-10-CM

## 2023-02-02 DIAGNOSIS — M48.061 SPINAL STENOSIS OF LUMBAR REGION, UNSPECIFIED WHETHER NEUROGENIC CLAUDICATION PRESENT: ICD-10-CM

## 2023-02-02 DIAGNOSIS — M54.50 CHRONIC LOW BACK PAIN, UNSPECIFIED BACK PAIN LATERALITY, UNSPECIFIED WHETHER SCIATICA PRESENT: ICD-10-CM

## 2023-02-02 DIAGNOSIS — G89.29 CHRONIC LOW BACK PAIN, UNSPECIFIED BACK PAIN LATERALITY, UNSPECIFIED WHETHER SCIATICA PRESENT: Primary | ICD-10-CM

## 2023-02-02 DIAGNOSIS — G89.29 CHRONIC LOW BACK PAIN, UNSPECIFIED BACK PAIN LATERALITY, UNSPECIFIED WHETHER SCIATICA PRESENT: ICD-10-CM

## 2023-02-02 DIAGNOSIS — M81.0 OSTEOPOROSIS, UNSPECIFIED OSTEOPOROSIS TYPE, UNSPECIFIED PATHOLOGICAL FRACTURE PRESENCE: ICD-10-CM

## 2023-02-02 DIAGNOSIS — M54.50 CHRONIC LOW BACK PAIN, UNSPECIFIED BACK PAIN LATERALITY, UNSPECIFIED WHETHER SCIATICA PRESENT: Primary | ICD-10-CM

## 2023-02-02 PROCEDURE — 93010 EKG 12-LEAD: ICD-10-PCS | Mod: S$GLB,,, | Performed by: INTERNAL MEDICINE

## 2023-02-02 PROCEDURE — 72100 XR LUMBAR SPINE AP AND LATERAL: ICD-10-PCS | Mod: 26,,, | Performed by: RADIOLOGY

## 2023-02-02 PROCEDURE — 71046 X-RAY EXAM CHEST 2 VIEWS: CPT | Mod: 26,,, | Performed by: RADIOLOGY

## 2023-02-02 PROCEDURE — 93010 ELECTROCARDIOGRAM REPORT: CPT | Mod: S$GLB,,, | Performed by: INTERNAL MEDICINE

## 2023-02-02 PROCEDURE — 71046 X-RAY EXAM CHEST 2 VIEWS: CPT | Mod: TC,PO

## 2023-02-02 PROCEDURE — 72100 X-RAY EXAM L-S SPINE 2/3 VWS: CPT | Mod: TC,PO

## 2023-02-02 PROCEDURE — 72100 X-RAY EXAM L-S SPINE 2/3 VWS: CPT | Mod: 26,,, | Performed by: RADIOLOGY

## 2023-02-02 PROCEDURE — 71046 XR CHEST PA AND LATERAL: ICD-10-PCS | Mod: 26,,, | Performed by: RADIOLOGY

## 2023-02-02 RX ORDER — TIZANIDINE 4 MG/1
4 TABLET ORAL 2 TIMES DAILY PRN
Qty: 30 TABLET | Refills: 2 | Status: SHIPPED | OUTPATIENT
Start: 2023-02-02 | End: 2023-03-04

## 2023-02-15 ENCOUNTER — HOSPITAL ENCOUNTER (EMERGENCY)
Facility: HOSPITAL | Age: 73
Discharge: HOME | End: 2023-02-15
Attending: FAMILY MEDICINE
Payer: MEDICARE

## 2023-02-15 VITALS
DIASTOLIC BLOOD PRESSURE: 81 MMHG | OXYGEN SATURATION: 100 % | TEMPERATURE: 99 F | BODY MASS INDEX: 20.93 KG/M2 | WEIGHT: 163 LBS | HEART RATE: 81 BPM | RESPIRATION RATE: 18 BRPM | SYSTOLIC BLOOD PRESSURE: 168 MMHG

## 2023-02-15 DIAGNOSIS — R33.9 URINARY RETENTION: Primary | ICD-10-CM

## 2023-02-15 PROCEDURE — 51702 INSERT TEMP BLADDER CATH: CPT

## 2023-02-15 PROCEDURE — 99283 EMERGENCY DEPT VISIT LOW MDM: CPT | Mod: 25

## 2023-02-16 NOTE — ED PROVIDER NOTES
SCRIBE #1 NOTE: IKurt, am scribing for, and in the presence of, Shu Olmedo MD. I have scribed the entire note.       History     Chief Complaint   Patient presents with    Urinary Retention     Pt had hernia surgery this morning and reports that his last urination was before the surgery.     Review of patient's allergies indicates:   Allergen Reactions    Tetanus vaccines and toxoid Other (See Comments)     Always told he had a reaction to it  Other reaction(s): Unknown         History of Present Illness     HPI    2/15/2023, 7:29 PM  History obtained from the patient      History of Present Illness: Brett Nugent is a 72 y.o. male patient with a PMHx of HLD, HTN,a nd anxiety who presents to the Emergency Department for evaluation of Urinary Retention which onset gradually since this am's hernia surgery at [facility]. Symptoms are constant and moderate in severity. No mitigating or exacerbating factors reported. Associated sxs abd pain. Patient denies any SOB, fever, HA, CP, and all other sxs at this time. No prior Tx. Pt reports last urination was before surgery. No further complaints or concerns at this time.       Arrival mode: Ambulance Service    PCP: Leonardo Khan MD        Past Medical History:  Past Medical History:   Diagnosis Date    Anxiety     Enlarged prostate     General anesthetics causing adverse effect in therapeutic use     Slow to wake    GERD (gastroesophageal reflux disease)     [place] (hard of hearing)     has hearing aids    Hyperlipidemia     Osteoporosis     White coat syndrome without diagnosis of hypertension        Past Surgical History:  Past Surgical History:   Procedure Laterality Date    FUSION OF POSTERIOR COLUMN OF CERVICAL SPINE USING COMPUTER AIDED NAVIGATION Bilateral 3/4/2019    Procedure: FUSION, SPINE, POSTERIOR SPINAL COLUMN, CERVICAL, USING COMPUTER-ASSISTED NAVIGATION;  Surgeon: Helio Wolfe MD;  Location: [facility];  Service: Neurosurgery;  Laterality:  Bilateral;  C3-5    PROSTATE SURGERY      REPAIR OF CEREBROSPINAL FLUID LEAK OF SPINE Bilateral 3/14/2019    Procedure: REPAIR, CSF LEAK, SPINE;  Surgeon: Helio Wolfe MD;  Location: [facility];  Service: Neurosurgery;  Laterality: Bilateral;    SINUS SURGERY      SPINE SURGERY           Family History:  No family history on file.    Social History:  Social History     Tobacco Use    Smoking status: Never    Smokeless tobacco: Never   Substance and Sexual Activity    Alcohol use: No    Drug use: No    Sexual activity: Yes     Partners: Female        Review of Systems     Review of Systems   Constitutional:  Negative for chills and fever.   HENT:  Negative for congestion and sore throat.    Respiratory:  Negative for cough and shortness of breath.    Cardiovascular:  Negative for chest pain.   Gastrointestinal:  Positive for abdominal pain. Negative for nausea and vomiting.   Genitourinary:  Positive for difficulty urinating. Negative for dysuria.   Musculoskeletal:  Negative for back pain and myalgias.   Skin:  Negative for rash.   Neurological:  Negative for dizziness, weakness and headaches.   Hematological:  Does not bruise/bleed easily.   All other systems reviewed and are negative.     Physical Exam     Initial Vitals [02/15/23 1824]   BP Pulse Resp Temp SpO2   (!) 182/97 88 18 98.6 °F (37 °C) 98 %      MAP       --          Physical Exam  Nursing Notes and Vital Signs Reviewed.  Constitutional: Patient is in no acute distress. Well-developed and well-nourished. Anxious.  Head: Atraumatic. Normocephalic.  Eyes: PERRL. EOM intact. Conjunctivae are not pale. No scleral icterus.  ENT: Mucous membranes are moist. Oropharynx is clear and symmetric.    Neck: Supple. Full ROM. No lymphadenopathy.  Cardiovascular: Regular rate. Regular rhythm. No murmurs, rubs, or gallops. Distal pulses are 2+ and symmetric.  Pulmonary/Chest: No respiratory distress. Clear to auscultation bilaterally. No wheezing or  rales.  Abdominal: Soft and non-distended. Suprapubic tenderness.  No rebound, guarding, or rigidity. Good bowel sounds.  Genitourinary: No CVA tenderness  Musculoskeletal: Moves all extremities. No obvious deformities. No edema. No calf tenderness.  Skin: Warm and dry.  Neurological:  Alert, awake, and appropriate.  Normal speech.  No acute focal neurological deficits are appreciated.  Psychiatric: Normal affect. Good eye contact. Appropriate in content.     ED Course   Procedures  ED Vital Signs:  Vitals:    02/15/23 1824 02/15/23 1930 02/15/23 2010 02/15/23 2130   BP: (!) 182/97 (!) 170/86  (!) 180/88   Pulse: 88 86  88   Resp: 18 18  18   Temp: 98.6 °F (37 °C)      TempSrc: Oral      SpO2: 98% 99%  100%   Weight:   73.9 kg (163 lb)     02/15/23 2230   BP: (!) 168/81   Pulse: 81   Resp: 18   Temp:    TempSrc:    SpO2: 100%   Weight:        Abnormal Lab Results:  Labs Reviewed   URINALYSIS            Imaging Results:  Imaging Results    None                     The Emergency Provider reviewed the vital signs and test results, which are outlined above.     ED Discussion     9:38 PM: Reassessed pt at this time.  Discussed with pt all pertinent ED information and results. Discussed pt dx and plan of tx. Gave pt all f/u and return to the ED instructions. All questions and concerns were addressed at this time. Pt expresses understanding of information and instructions, and is comfortable with plan to discharge. Pt is stable for discharge.    I discussed with patient and/or family/caretaker that evaluation in the ED does not suggest any emergent or life threatening medical conditions requiring immediate intervention beyond what was provided in the ED, and I believe patient is safe for discharge.  Regardless, an unremarkable evaluation in the ED does not preclude the development or presence of a serious of life threatening condition. As such, patient was instructed to return immediately for any worsening or change in  current symptoms.          Medical Decision Making:   Clinical Tests:   Lab Tests: Ordered         ED Medication(s):  Medications - No data to display    Discharge Medication List as of 2/15/2023  9:37 PM           Follow-up Information       Schedule an appointment as soon as possible for a visit  with Leonardo Khan MD.    Specialty: Family Medicine  Why: As needed  Contact information:  [address]  [facility] [address]  [phone]                                 Scribe Attestation:   Scribe #1: I performed the above scribed service and the documentation accurately describes the services I performed. I attest to the accuracy of the note.     Attending:   Physician Attestation Statement for Scribe #1: I, Shu Olmedo MD, personally performed the services described in this documentation, as scribed by Kurt Power, in my presence, and it is both accurate and complete.           Clinical Impression       ICD-10-CM ICD-9-CM   1. Urinary retention  R33.9 788.20       Disposition:   Disposition: Discharged  Condition: Stable      Shu Olmedo MD  02/18/23 1541

## 2023-05-23 ENCOUNTER — PATIENT MESSAGE (OUTPATIENT)
Dept: RESEARCH | Facility: HOSPITAL | Age: 73
End: 2023-05-23
Payer: MEDICARE

## 2023-08-01 ENCOUNTER — PATIENT MESSAGE (OUTPATIENT)
Dept: RHEUMATOLOGY | Facility: CLINIC | Age: 73
End: 2023-08-01
Payer: MEDICARE

## 2023-08-09 ENCOUNTER — TELEPHONE (OUTPATIENT)
Dept: RHEUMATOLOGY | Facility: CLINIC | Age: 73
End: 2023-08-09
Payer: MEDICARE

## 2023-08-09 ENCOUNTER — LAB VISIT (OUTPATIENT)
Dept: LAB | Facility: HOSPITAL | Age: 73
End: 2023-08-09
Attending: INTERNAL MEDICINE
Payer: MEDICARE

## 2023-08-09 DIAGNOSIS — M81.0 OSTEOPOROSIS, UNSPECIFIED OSTEOPOROSIS TYPE, UNSPECIFIED PATHOLOGICAL FRACTURE PRESENCE: ICD-10-CM

## 2023-08-09 DIAGNOSIS — E55.9 VITAMIN D INSUFFICIENCY: ICD-10-CM

## 2023-08-09 LAB
25(OH)D3+25(OH)D2 SERPL-MCNC: 77 NG/ML (ref 30–96)
ALBUMIN SERPL BCP-MCNC: 4 G/DL (ref 3.5–5.2)
ALP SERPL-CCNC: 48 U/L (ref 55–135)
ALT SERPL W/O P-5'-P-CCNC: 12 U/L (ref 10–44)
ANION GAP SERPL CALC-SCNC: 10 MMOL/L (ref 8–16)
AST SERPL-CCNC: 19 U/L (ref 10–40)
BILIRUB SERPL-MCNC: 0.4 MG/DL (ref 0.1–1)
BUN SERPL-MCNC: 20 MG/DL (ref 8–23)
CALCIUM SERPL-MCNC: 9.5 MG/DL (ref 8.7–10.5)
CHLORIDE SERPL-SCNC: 106 MMOL/L (ref 95–110)
CO2 SERPL-SCNC: 26 MMOL/L (ref 23–29)
CREAT SERPL-MCNC: 0.9 MG/DL (ref 0.5–1.4)
EST. GFR  (NO RACE VARIABLE): >60 ML/MIN/1.73 M^2
GLUCOSE SERPL-MCNC: 45 MG/DL (ref 70–110)
POTASSIUM SERPL-SCNC: 3.7 MMOL/L (ref 3.5–5.1)
PROT SERPL-MCNC: 7.2 G/DL (ref 6–8.4)
SODIUM SERPL-SCNC: 142 MMOL/L (ref 136–145)

## 2023-08-09 PROCEDURE — 36415 COLL VENOUS BLD VENIPUNCTURE: CPT | Mod: PO | Performed by: INTERNAL MEDICINE

## 2023-08-09 PROCEDURE — 80053 COMPREHEN METABOLIC PANEL: CPT | Performed by: INTERNAL MEDICINE

## 2023-08-09 PROCEDURE — 82306 VITAMIN D 25 HYDROXY: CPT | Performed by: INTERNAL MEDICINE

## 2023-08-09 NOTE — TELEPHONE ENCOUNTER
"      Contacted patient to discuss this with him. Patient stated that he feels fine and stated that he will contact his primary care physician to let them know what is going on. Patient thanked me for the phone call and verbalized understanding. All questions answered.     Dayna Gonzalez (Allye), [facility]  Rheumatology Department   "

## 2023-08-17 ENCOUNTER — INFUSION (OUTPATIENT)
Dept: INFUSION THERAPY | Facility: HOSPITAL | Age: 73
End: 2023-08-17
Attending: INTERNAL MEDICINE
Payer: MEDICARE

## 2023-08-17 ENCOUNTER — OFFICE VISIT (OUTPATIENT)
Dept: RHEUMATOLOGY | Facility: CLINIC | Age: 73
End: 2023-08-17
Payer: MEDICARE

## 2023-08-17 VITALS
RESPIRATION RATE: 18 BRPM | OXYGEN SATURATION: 99 % | HEART RATE: 61 BPM | DIASTOLIC BLOOD PRESSURE: 72 MMHG | TEMPERATURE: 98 F | SYSTOLIC BLOOD PRESSURE: 132 MMHG

## 2023-08-17 VITALS
HEIGHT: 74 IN | SYSTOLIC BLOOD PRESSURE: 124 MMHG | DIASTOLIC BLOOD PRESSURE: 67 MMHG | BODY MASS INDEX: 20.67 KG/M2 | HEART RATE: 61 BPM

## 2023-08-17 DIAGNOSIS — M50.30 DEGENERATIVE DISC DISEASE, CERVICAL: ICD-10-CM

## 2023-08-17 DIAGNOSIS — M81.0 OSTEOPOROSIS, UNSPECIFIED OSTEOPOROSIS TYPE, UNSPECIFIED PATHOLOGICAL FRACTURE PRESENCE: Primary | ICD-10-CM

## 2023-08-17 DIAGNOSIS — M51.36 DDD (DEGENERATIVE DISC DISEASE), LUMBAR: ICD-10-CM

## 2023-08-17 DIAGNOSIS — R73.03 PRE-DIABETES: ICD-10-CM

## 2023-08-17 DIAGNOSIS — K21.9 GASTROESOPHAGEAL REFLUX DISEASE WITHOUT ESOPHAGITIS: ICD-10-CM

## 2023-08-17 DIAGNOSIS — Z51.81 MEDICATION MONITORING ENCOUNTER: ICD-10-CM

## 2023-08-17 DIAGNOSIS — E55.9 VITAMIN D INSUFFICIENCY: ICD-10-CM

## 2023-08-17 DIAGNOSIS — M81.0 AGE-RELATED OSTEOPOROSIS WITHOUT CURRENT PATHOLOGICAL FRACTURE: ICD-10-CM

## 2023-08-17 PROCEDURE — 3044F HG A1C LEVEL LT 7.0%: CPT | Mod: CPTII,S$GLB,, | Performed by: INTERNAL MEDICINE

## 2023-08-17 PROCEDURE — 3044F PR MOST RECENT HEMOGLOBIN A1C LEVEL <7.0%: ICD-10-PCS | Mod: CPTII,S$GLB,, | Performed by: INTERNAL MEDICINE

## 2023-08-17 PROCEDURE — 3288F FALL RISK ASSESSMENT DOCD: CPT | Mod: CPTII,S$GLB,, | Performed by: INTERNAL MEDICINE

## 2023-08-17 PROCEDURE — 1101F PR PT FALLS ASSESS DOC 0-1 FALLS W/OUT INJ PAST YR: ICD-10-PCS | Mod: CPTII,S$GLB,, | Performed by: INTERNAL MEDICINE

## 2023-08-17 PROCEDURE — 3288F PR FALLS RISK ASSESSMENT DOCUMENTED: ICD-10-PCS | Mod: CPTII,S$GLB,, | Performed by: INTERNAL MEDICINE

## 2023-08-17 PROCEDURE — 1101F PT FALLS ASSESS-DOCD LE1/YR: CPT | Mod: CPTII,S$GLB,, | Performed by: INTERNAL MEDICINE

## 2023-08-17 PROCEDURE — 3078F PR MOST RECENT DIASTOLIC BLOOD PRESSURE < 80 MM HG: ICD-10-PCS | Mod: CPTII,S$GLB,, | Performed by: INTERNAL MEDICINE

## 2023-08-17 PROCEDURE — 3074F SYST BP LT 130 MM HG: CPT | Mod: CPTII,S$GLB,, | Performed by: INTERNAL MEDICINE

## 2023-08-17 PROCEDURE — 99214 PR OFFICE/OUTPT VISIT, EST, LEVL IV, 30-39 MIN: ICD-10-PCS | Mod: S$GLB,,, | Performed by: INTERNAL MEDICINE

## 2023-08-17 PROCEDURE — 1157F ADVNC CARE PLAN IN RCRD: CPT | Mod: CPTII,S$GLB,, | Performed by: INTERNAL MEDICINE

## 2023-08-17 PROCEDURE — 3074F PR MOST RECENT SYSTOLIC BLOOD PRESSURE < 130 MM HG: ICD-10-PCS | Mod: CPTII,S$GLB,, | Performed by: INTERNAL MEDICINE

## 2023-08-17 PROCEDURE — 1157F PR ADVANCE CARE PLAN OR EQUIV PRESENT IN MEDICAL RECORD: ICD-10-PCS | Mod: CPTII,S$GLB,, | Performed by: INTERNAL MEDICINE

## 2023-08-17 PROCEDURE — 63600175 PHARM REV CODE 636 W HCPCS: Mod: JZ,JG | Performed by: INTERNAL MEDICINE

## 2023-08-17 PROCEDURE — 96372 THER/PROPH/DIAG INJ SC/IM: CPT

## 2023-08-17 PROCEDURE — 99214 OFFICE O/P EST MOD 30 MIN: CPT | Mod: S$GLB,,, | Performed by: INTERNAL MEDICINE

## 2023-08-17 PROCEDURE — 3078F DIAST BP <80 MM HG: CPT | Mod: CPTII,S$GLB,, | Performed by: INTERNAL MEDICINE

## 2023-08-17 PROCEDURE — 1159F MED LIST DOCD IN RCRD: CPT | Mod: CPTII,S$GLB,, | Performed by: INTERNAL MEDICINE

## 2023-08-17 PROCEDURE — 99999 PR PBB SHADOW E&M-EST. PATIENT-LVL III: CPT | Mod: PBBFAC,,, | Performed by: INTERNAL MEDICINE

## 2023-08-17 PROCEDURE — 1159F PR MEDICATION LIST DOCUMENTED IN MEDICAL RECORD: ICD-10-PCS | Mod: CPTII,S$GLB,, | Performed by: INTERNAL MEDICINE

## 2023-08-17 PROCEDURE — 99999 PR PBB SHADOW E&M-EST. PATIENT-LVL III: ICD-10-PCS | Mod: PBBFAC,,, | Performed by: INTERNAL MEDICINE

## 2023-08-17 RX ADMIN — DENOSUMAB 60 MG: 60 INJECTION SUBCUTANEOUS at 01:08

## 2023-08-17 NOTE — DISCHARGE INSTRUCTIONS
.[facility]  [address] [address]  [address]  [phone] phone     [phone] fax  Hours of Operation: Monday- Friday 8:00am- 5:00pm  After hours phone  [phone]  Hematology / Oncology Physicians on call    Dr. Brian Polanco      Nurse Practitioners:    Griselda Fagan, JACY Archibald, JACY Ya, JACY Chino, JACY Moseley, PA      Please don't hesitate to call if you have any concerns.     .Remember to take your calcium with vitamin D supplement as directed.   It is not advisable to undergo invasive dental procedure, within 3 months of your injection, unless approved by your treating provider.

## 2023-08-17 NOTE — PROGRESS NOTES
RHEUMATOLOGY OUTPATIENT CLINIC NOTE    8/17/2023    Attending Rheumatologist: Nicholas Brunson  Primary Care Provider/Physician Requesting Consultation: Leonardo Khan MD   Chief Complaint/Reason For Consultation:  Osteoporosis      Subjective:     Brett Nugent is a 73 y.o. White male with OP     No acute complaints.     Review of Systems   Genitourinary:  Negative for dysuria.   Musculoskeletal:  Positive for falls. Negative for back pain and joint pain.   Neurological:  Negative for focal weakness and weakness.       Chronic comorbid conditions affecting medical decision making today:  Past Medical History:   Diagnosis Date    Anxiety     Enlarged prostate     General anesthetics causing adverse effect in therapeutic use     Slow to wake    GERD (gastroesophageal reflux disease)     [place] (hard of hearing)     has hearing aids    Hyperlipidemia     Osteoporosis     White coat syndrome without diagnosis of hypertension      Past Surgical History:   Procedure Laterality Date    FUSION OF POSTERIOR COLUMN OF CERVICAL SPINE USING COMPUTER AIDED NAVIGATION Bilateral 3/4/2019    Procedure: FUSION, SPINE, POSTERIOR SPINAL COLUMN, CERVICAL, USING COMPUTER-ASSISTED NAVIGATION;  Surgeon: Helio Wolfe MD;  Location: [facility] OR;  Service: Neurosurgery;  Laterality: Bilateral;  C3-5    PROSTATE SURGERY      REPAIR OF CEREBROSPINAL FLUID LEAK OF SPINE Bilateral 3/14/2019    Procedure: REPAIR, CSF LEAK, SPINE;  Surgeon: Helio Wolfe MD;  Location: [facility] OR;  Service: Neurosurgery;  Laterality: Bilateral;    SINUS SURGERY      SPINE SURGERY       History reviewed. No pertinent family history.  Social History     Tobacco Use   Smoking Status Never   Smokeless Tobacco Never       Current Outpatient Medications:     ascorbic acid, vitamin C, (VITAMIN C) 500 MG tablet, Take 1 tablet by mouth once daily., Disp: , Rfl:     calcium carbonate (OS-JURGEN) 500 mg calcium (1,250 mg) tablet, Take 1 tablet by mouth once daily., Disp: ,  Rfl:     cycloSPORINE (RESTASIS) 0.05 % ophthalmic emulsion, Place 1 drop into both eyes 2 (two) times daily., Disp: , Rfl:     denosumab (PROLIA) 60 mg/mL Syrg, Inject 60 mg into the skin., Disp: , Rfl:     esomeprazole (NEXIUM) 40 MG capsule, Take 1 capsule (40 mg total) by mouth once daily., Disp: 30 capsule, Rfl: 11    simvastatin (ZOCOR) 40 MG tablet, Take 1 tablet (40 mg total) by mouth every evening., Disp: 30 tablet, Rfl: 11    vitamin D (VITAMIN D3) 1000 units Tab, Take 5,000 Units by mouth once daily. , Disp: , Rfl:     zinc 50 mg Tab, Take 1 tablet by mouth once daily., Disp: , Rfl:   No current facility-administered medications for this visit.    Facility-Administered Medications Ordered in Other Visits:     chlorhexidine 0.12 % solution 10 mL, 10 mL, Mouth/Throat, On Call Procedure, Kathy Elizabeth PA-C    lidocaine (PF) 10 mg/ml (1%) injection 10 mg, 1 mL, Intradermal, Once, Homer Johnson DO    nozaseptin (NOZIN) nasal , , Each Nostril, On Call Procedure, Kathy Elizabeth PA-C, Given at 03/04/19 0826     Objective:     Vitals:    08/17/23 1232   BP: 124/67   Pulse: 61     Physical Exam   Eyes: Conjunctivae are normal.   Pulmonary/Chest: Effort normal. No respiratory distress.   Musculoskeletal:         General: No swelling or deformity. Normal range of motion.   Skin: No rash noted.       Reviewed available old and all outside pertinent medical records available.    All lab results personally reviewed and interpreted by me.       ASSESSMENT      Encounter Diagnoses   Name Primary?    Osteoporosis, unspecified osteoporosis type, unspecified pathological fracture presence Yes    DDD (degenerative disc disease), lumbar     Degenerative disc disease, cervical     Age-related osteoporosis without current pathological fracture     Gastroesophageal reflux disease without esophagitis     Medication monitoring encounter     Vitamin D insufficiency     Pre-diabetes       PLAN      Tolerating Prolia after bone anabolic course (8004-7730).  Exam non focal w/o reproducible weakness or active synovitis.  Kidney function stable.  No hypocalcemia.  Last vit D measured WNR.  No vertebral compression deformities reported on imaging, prominent DDD.  DXA w/ osteoporotic T scores.  Plan to c/w Prolia indefinitely.  At risk of fragility fractures with Prolia discontinuation.  C/w Ca/vit D.  CMP within 2 weeks of Prolia injections.    Nicholas Brunson M.D.

## 2023-10-24 NOTE — PROGRESS NOTES
Clinic Visit Note  Ann-Marie Quarles 34 y.o. male   MRN: [id number]    Sammy Lieberman DO  [facility]   [facility]

## 2023-11-16 PROBLEM — R73.03 PREDIABETES: Status: ACTIVE | Noted: 2023-11-16

## 2024-02-12 ENCOUNTER — LAB VISIT (OUTPATIENT)
Dept: LAB | Facility: HOSPITAL | Age: 74
End: 2024-02-12
Attending: INTERNAL MEDICINE
Payer: MEDICARE

## 2024-02-12 DIAGNOSIS — M81.0 OSTEOPOROSIS, UNSPECIFIED OSTEOPOROSIS TYPE, UNSPECIFIED PATHOLOGICAL FRACTURE PRESENCE: ICD-10-CM

## 2024-02-12 LAB
ALBUMIN SERPL BCP-MCNC: 3.9 G/DL (ref 3.5–5.2)
ALP SERPL-CCNC: 42 U/L (ref 55–135)
ALT SERPL W/O P-5'-P-CCNC: 12 U/L (ref 10–44)
ANION GAP SERPL CALC-SCNC: 9 MMOL/L (ref 8–16)
AST SERPL-CCNC: 19 U/L (ref 10–40)
BILIRUB SERPL-MCNC: 0.4 MG/DL (ref 0.1–1)
BUN SERPL-MCNC: 20 MG/DL (ref 8–23)
CALCIUM SERPL-MCNC: 9.2 MG/DL (ref 8.7–10.5)
CHLORIDE SERPL-SCNC: 107 MMOL/L (ref 95–110)
CO2 SERPL-SCNC: 26 MMOL/L (ref 23–29)
CREAT SERPL-MCNC: 0.9 MG/DL (ref 0.5–1.4)
EST. GFR  (NO RACE VARIABLE): >60 ML/MIN/1.73 M^2
GLUCOSE SERPL-MCNC: 57 MG/DL (ref 70–110)
POTASSIUM SERPL-SCNC: 4 MMOL/L (ref 3.5–5.1)
PROT SERPL-MCNC: 7 G/DL (ref 6–8.4)
SODIUM SERPL-SCNC: 142 MMOL/L (ref 136–145)

## 2024-02-12 PROCEDURE — 80053 COMPREHEN METABOLIC PANEL: CPT | Performed by: INTERNAL MEDICINE

## 2024-02-12 PROCEDURE — 36415 COLL VENOUS BLD VENIPUNCTURE: CPT | Mod: PO | Performed by: INTERNAL MEDICINE

## 2024-02-19 ENCOUNTER — INFUSION (OUTPATIENT)
Dept: INFUSION THERAPY | Facility: HOSPITAL | Age: 74
End: 2024-02-19
Attending: INTERNAL MEDICINE
Payer: MEDICARE

## 2024-02-19 VITALS
TEMPERATURE: 99 F | OXYGEN SATURATION: 96 % | SYSTOLIC BLOOD PRESSURE: 118 MMHG | DIASTOLIC BLOOD PRESSURE: 74 MMHG | HEART RATE: 61 BPM | RESPIRATION RATE: 18 BRPM

## 2024-02-19 DIAGNOSIS — M81.0 OSTEOPOROSIS, UNSPECIFIED OSTEOPOROSIS TYPE, UNSPECIFIED PATHOLOGICAL FRACTURE PRESENCE: Primary | ICD-10-CM

## 2024-02-19 PROCEDURE — 96372 THER/PROPH/DIAG INJ SC/IM: CPT

## 2024-02-19 PROCEDURE — 63600175 PHARM REV CODE 636 W HCPCS: Mod: JZ,JG | Performed by: INTERNAL MEDICINE

## 2024-02-19 RX ADMIN — DENOSUMAB 60 MG: 60 INJECTION SUBCUTANEOUS at 02:02

## 2024-02-19 NOTE — NURSING
No recent/upcoming dental work.  Taking ca/vit d.     Injection given without difficulties.Bandaid applied. Patient instructed to stay in the clinic for 15 minutes. Patient verbalized understanding and will notify nurse with any complaints.

## 2024-07-31 ENCOUNTER — PATIENT MESSAGE (OUTPATIENT)
Dept: RESEARCH | Facility: HOSPITAL | Age: 74
End: 2024-07-31
Payer: MEDICARE

## 2024-08-15 ENCOUNTER — PATIENT MESSAGE (OUTPATIENT)
Dept: RHEUMATOLOGY | Facility: CLINIC | Age: 74
End: 2024-08-15
Payer: MEDICARE

## 2024-08-15 ENCOUNTER — LAB VISIT (OUTPATIENT)
Dept: LAB | Facility: HOSPITAL | Age: 74
End: 2024-08-15
Attending: INTERNAL MEDICINE
Payer: MEDICARE

## 2024-08-15 DIAGNOSIS — M81.0 OSTEOPOROSIS, UNSPECIFIED OSTEOPOROSIS TYPE, UNSPECIFIED PATHOLOGICAL FRACTURE PRESENCE: ICD-10-CM

## 2024-08-15 LAB
25(OH)D3+25(OH)D2 SERPL-MCNC: 81 NG/ML (ref 30–96)
ALBUMIN SERPL BCP-MCNC: 3.8 G/DL (ref 3.5–5.2)
ALP SERPL-CCNC: 47 U/L (ref 55–135)
ALT SERPL W/O P-5'-P-CCNC: 10 U/L (ref 10–44)
ANION GAP SERPL CALC-SCNC: 7 MMOL/L (ref 8–16)
AST SERPL-CCNC: 18 U/L (ref 10–40)
BILIRUB SERPL-MCNC: 0.4 MG/DL (ref 0.1–1)
BUN SERPL-MCNC: 21 MG/DL (ref 8–23)
CALCIUM SERPL-MCNC: 9 MG/DL (ref 8.7–10.5)
CHLORIDE SERPL-SCNC: 107 MMOL/L (ref 95–110)
CO2 SERPL-SCNC: 26 MMOL/L (ref 23–29)
CREAT SERPL-MCNC: 1 MG/DL (ref 0.5–1.4)
EST. GFR  (NO RACE VARIABLE): >60 ML/MIN/1.73 M^2
GLUCOSE SERPL-MCNC: 70 MG/DL (ref 70–110)
POTASSIUM SERPL-SCNC: 4.6 MMOL/L (ref 3.5–5.1)
PROT SERPL-MCNC: 6.7 G/DL (ref 6–8.4)
SODIUM SERPL-SCNC: 140 MMOL/L (ref 136–145)

## 2024-08-15 PROCEDURE — 82306 VITAMIN D 25 HYDROXY: CPT | Performed by: INTERNAL MEDICINE

## 2024-08-15 PROCEDURE — 36415 COLL VENOUS BLD VENIPUNCTURE: CPT | Mod: PO | Performed by: INTERNAL MEDICINE

## 2024-08-15 PROCEDURE — 80053 COMPREHEN METABOLIC PANEL: CPT | Performed by: INTERNAL MEDICINE

## 2024-08-22 ENCOUNTER — OFFICE VISIT (OUTPATIENT)
Dept: RHEUMATOLOGY | Facility: CLINIC | Age: 74
End: 2024-08-22
Payer: MEDICARE

## 2024-08-22 VITALS
HEIGHT: 74 IN | HEART RATE: 88 BPM | SYSTOLIC BLOOD PRESSURE: 138 MMHG | WEIGHT: 165 LBS | BODY MASS INDEX: 21.17 KG/M2 | DIASTOLIC BLOOD PRESSURE: 91 MMHG

## 2024-08-22 DIAGNOSIS — Z51.81 MEDICATION MONITORING ENCOUNTER: ICD-10-CM

## 2024-08-22 DIAGNOSIS — M81.0 AGE-RELATED OSTEOPOROSIS WITHOUT CURRENT PATHOLOGICAL FRACTURE: Primary | ICD-10-CM

## 2024-08-22 DIAGNOSIS — Z91.89 NEED FOR DENTAL CARE: ICD-10-CM

## 2024-08-22 DIAGNOSIS — E55.9 VITAMIN D INSUFFICIENCY: ICD-10-CM

## 2024-08-22 PROCEDURE — 99999 PR PBB SHADOW E&M-EST. PATIENT-LVL III: CPT | Mod: PBBFAC,,, | Performed by: INTERNAL MEDICINE

## 2024-08-22 RX ORDER — OMEPRAZOLE 40 MG/1
40 CAPSULE, DELAYED RELEASE ORAL
COMMUNITY

## 2024-08-22 RX ORDER — HYDROCODONE BITARTRATE AND ACETAMINOPHEN 7.5; 325 MG/1; MG/1
TABLET ORAL
COMMUNITY

## 2024-08-22 RX ORDER — METOPROLOL SUCCINATE 50 MG/1
50 TABLET, EXTENDED RELEASE ORAL
COMMUNITY
Start: 2024-07-18

## 2024-08-22 NOTE — PROGRESS NOTES
RHEUMATOLOGY OUTPATIENT CLINIC NOTE    8/22/2024    Attending Rheumatologist: Nicholas Brunson  Primary Care Provider/Physician Requesting Consultation: Leonardo Khan MD   Chief Complaint/Reason For Consultation:  Osteoporosis and DDD      Subjective:     Brett Nugent is a 74 y.o. White male with OP    No acute complaints.  Tentatively scheduled for dental implant in the near future.  No falls or fractures since last visit.     Review of Systems   Constitutional:  Negative for fever.   Eyes:  Negative for photophobia and pain.   Genitourinary:  Negative for dysuria, hematuria and urgency.   Musculoskeletal:  Negative for back pain and joint pain.   Skin:  Negative for rash.   Neurological:  Negative for focal weakness and weakness.       Chronic comorbid conditions affecting medical decision making today:  Past Medical History:   Diagnosis Date    Anxiety     Enlarged prostate     General anesthetics causing adverse effect in therapeutic use     Slow to wake    GERD (gastroesophageal reflux disease)     [place] (hard of hearing)     has hearing aids    Hyperlipidemia     Osteoporosis     White coat syndrome without diagnosis of hypertension      Past Surgical History:   Procedure Laterality Date    FUSION OF POSTERIOR COLUMN OF CERVICAL SPINE USING COMPUTER AIDED NAVIGATION Bilateral 03/04/2019    Procedure: FUSION, SPINE, POSTERIOR SPINAL COLUMN, CERVICAL, USING COMPUTER-ASSISTED NAVIGATION;  Surgeon: Helio Wolfe MD;  Location: [facility] OR;  Service: Neurosurgery;  Laterality: Bilateral;  C3-5    INSERTION OF PACEMAKER  06/2024    PROSTATE SURGERY      REPAIR OF CEREBROSPINAL FLUID LEAK OF SPINE Bilateral 03/14/2019    Procedure: REPAIR, CSF LEAK, SPINE;  Surgeon: Helio Wolfe MD;  Location: [facility] OR;  Service: Neurosurgery;  Laterality: Bilateral;    SINUS SURGERY      SPINE SURGERY       No family history on file.  Social History     Tobacco Use   Smoking Status Never   Smokeless Tobacco Never        Current Outpatient Medications:     ascorbic acid, vitamin C, (VITAMIN C) 500 MG tablet, Take 1 tablet by mouth once daily., Disp: , Rfl:     calcium carbonate (OS-JURGEN) 500 mg calcium (1,250 mg) tablet, Take 1 tablet by mouth once daily., Disp: , Rfl:     cycloSPORINE (RESTASIS) 0.05 % ophthalmic emulsion, Place 1 drop into both eyes 2 (two) times daily., Disp: , Rfl:     denosumab (PROLIA) 60 mg/mL Syrg, Inject 60 mg into the skin., Disp: , Rfl:     esomeprazole (NEXIUM) 40 MG capsule, Take 1 capsule (40 mg total) by mouth once daily., Disp: 90 capsule, Rfl: 3    HYDROcodone-acetaminophen (NORCO) 7.5-325 mg per tablet, Take by mouth., Disp: , Rfl:     metoprolol succinate (TOPROL-XL) 50 MG 24 hr tablet, Take 50 mg by mouth., Disp: , Rfl:     omeprazole (PRILOSEC) 40 MG capsule, Take 40 mg by mouth., Disp: , Rfl:     simvastatin (ZOCOR) 40 MG tablet, Take 1 tablet (40 mg total) by mouth every evening., Disp: 90 tablet, Rfl: 3    vitamin D (VITAMIN D3) 1000 units Tab, Take 5,000 Units by mouth once daily. , Disp: , Rfl:     zinc 50 mg Tab, Take 1 tablet by mouth once daily., Disp: , Rfl:   No current facility-administered medications for this visit.    Facility-Administered Medications Ordered in Other Visits:     chlorhexidine 0.12 % solution 10 mL, 10 mL, Mouth/Throat, On Call Procedure, Kathy Elizabeth PA-C    lidocaine (PF) 10 mg/ml (1%) injection 10 mg, 1 mL, Intradermal, Once, Homer Johnson,     nozaseptin (NOZIN) nasal , , Each Nostril, On Call Procedure, Kathy Elizabeth PA-C, Given at 03/04/19 0826     Objective:     Vitals:    08/22/24 1024   BP: (!) 138/91   Pulse: 88     Physical Exam   Pulmonary/Chest: Effort normal. No respiratory distress.   Musculoskeletal:         General: No swelling or tenderness. Normal range of motion.       Reviewed available old and all outside pertinent medical records available.    All lab results personally reviewed and interpreted by  me.       ASSESSMENT / PLAN     1. Age-related osteoporosis without current pathological fracture  Tolerating Prolia q6m w/o side effects.  Plan to update DXA:  determine benefit of Evenity (stagnant T scores of declining)  DXA Bone Density Axial Skeleton 1 or more sites      2. Need for dental care  Hold Prolia until completion of upcoming dental implant.       3. Medication monitoring encounter  Comprehensive Metabolic Panel      4. Vitamin D insufficiency  Ca/vit D supp OTC                Nicholas Brunson M.D.

## 2024-10-23 ENCOUNTER — PATIENT MESSAGE (OUTPATIENT)
Dept: RESEARCH | Facility: HOSPITAL | Age: 74
End: 2024-10-23
Payer: MEDICARE

## 2024-10-29 ENCOUNTER — LAB VISIT (OUTPATIENT)
Dept: LAB | Facility: HOSPITAL | Age: 74
End: 2024-10-29
Attending: INTERNAL MEDICINE
Payer: MEDICARE

## 2024-10-29 DIAGNOSIS — Z51.81 MEDICATION MONITORING ENCOUNTER: ICD-10-CM

## 2024-10-29 LAB
ALBUMIN SERPL BCP-MCNC: 3.7 G/DL (ref 3.5–5.2)
ALP SERPL-CCNC: 49 U/L (ref 40–150)
ALT SERPL W/O P-5'-P-CCNC: 12 U/L (ref 10–44)
ANION GAP SERPL CALC-SCNC: 8 MMOL/L (ref 8–16)
AST SERPL-CCNC: 21 U/L (ref 10–40)
BILIRUB SERPL-MCNC: 0.4 MG/DL (ref 0.1–1)
BUN SERPL-MCNC: 22 MG/DL (ref 8–23)
CALCIUM SERPL-MCNC: 9.1 MG/DL (ref 8.7–10.5)
CHLORIDE SERPL-SCNC: 108 MMOL/L (ref 95–110)
CO2 SERPL-SCNC: 27 MMOL/L (ref 23–29)
CREAT SERPL-MCNC: 1 MG/DL (ref 0.5–1.4)
EST. GFR  (NO RACE VARIABLE): >60 ML/MIN/1.73 M^2
GLUCOSE SERPL-MCNC: 69 MG/DL (ref 70–110)
POTASSIUM SERPL-SCNC: 4 MMOL/L (ref 3.5–5.1)
PROT SERPL-MCNC: 6.8 G/DL (ref 6–8.4)
SODIUM SERPL-SCNC: 143 MMOL/L (ref 136–145)

## 2024-10-29 PROCEDURE — 36415 COLL VENOUS BLD VENIPUNCTURE: CPT | Mod: PO | Performed by: INTERNAL MEDICINE

## 2024-10-29 PROCEDURE — 80053 COMPREHEN METABOLIC PANEL: CPT | Performed by: INTERNAL MEDICINE

## 2024-11-05 ENCOUNTER — INFUSION (OUTPATIENT)
Dept: INFUSION THERAPY | Facility: HOSPITAL | Age: 74
End: 2024-11-05
Attending: INTERNAL MEDICINE
Payer: MEDICARE

## 2024-11-05 NOTE — NURSING
"Patient is in the process of dental implant procedure. Sent the following message to Dr. Brunson,"Mr. Nugent said the implant procedure is in process, they have pulled the tooth then next step is the stent that they drill and put in the bone today. he said it will be at least another 3 months before it may be healed. So no prolia and hold indefinitel until cleared by dentist? " And received this response,"No prolia until completion of dental work ". Patient already has appointment for Dr. Brunson for when he says it should all be completed and healed..  "

## 2024-12-05 PROBLEM — Z95.0 PACEMAKER: Status: ACTIVE | Noted: 2024-12-05

## 2024-12-09 ENCOUNTER — TELEPHONE (OUTPATIENT)
Dept: SLEEP MEDICINE | Facility: CLINIC | Age: 74
End: 2024-12-09
Payer: MEDICARE

## 2024-12-09 NOTE — TELEPHONE ENCOUNTER
----- Message from Dorita Patterson sent at 12/5/2024 10:38 AM CST -----  [facility]

## 2025-01-16 ENCOUNTER — HOSPITAL ENCOUNTER (OUTPATIENT)
Dept: SLEEP MEDICINE | Facility: HOSPITAL | Age: 75
Discharge: HOME | End: 2025-01-16
Attending: FAMILY MEDICINE
Payer: MEDICARE

## 2025-01-16 DIAGNOSIS — G47.9 SLEEP DISORDER: ICD-10-CM

## 2025-01-16 DIAGNOSIS — G47.33 OSA (OBSTRUCTIVE SLEEP APNEA): Primary | ICD-10-CM

## 2025-01-16 DIAGNOSIS — F51.12 INSUFFICIENT SLEEP SYNDROME: ICD-10-CM

## 2025-01-16 PROCEDURE — 95806 SLEEP STUDY UNATT&RESP EFFT: CPT | Performed by: INTERNAL MEDICINE

## 2025-01-16 PROCEDURE — 95800 SLP STDY UNATTENDED: CPT | Mod: 26,,, | Performed by: INTERNAL MEDICINE

## 2025-01-16 NOTE — PROCEDURES
"PHYSICIAN INTERPRETATION AND COMMENTS: Findings are consistent with very mild, positional obstructive sleep  apnea(JOEY). In-lab polysomnography is indicated to validate diagnoses. Please refer to Sleep Disorder Clinic  CLINICAL HISTORY: 74 year old male. Stop bang 4. Has had sleep study in the past that did show sleep apnea but he could  not sleep well in the hospital setting, will try home study. BMI 21.06 kg/mB2  SLEEP STUDY FINDINGS: Patient underwent a 1 night Home Sleep Test and by behavioral criteria, slept for approximately  5.82 hours, with a sleep latency of 42 minutes and a sleep efficiency of 94%. The patient slept supine 10.7% of the night  based on valid recording time of 5.25 hours and is 9 times as likely to have apneas/hypopneas when supine. Snoring  occurs for 1.1% (30 dB) of the study. The mean pulse rate is 60.3 BPM.  TREATMENT CONSIDERATIONS: ENT evaluation, mandibular advancement device, trial of CPAP.      Dear Leonardo Khan MD  [address]  [address]  [place] [address]/Leonardo Khan MD         The sleep study that you ordered is complete.  You have ordered sleep LAB services to perform the sleep study for Brett Nugent .      Please find Sleep Study result in  the "Media tab" of Chart Review menu.        You can look  for the report in the  Media by the document type "Sleep Study Documents". Alphabetizing  "Document type" column helps to find the SLEEP STUDY report  Faster.       As the ordering provider, you are responsible for reviewing the results and implementing a treatment plan with your patient.    If you need a Sleep Medicine provider to explain the sleep study findings and arrange treatment for the patient, please refer patient for consultation to our Sleep Clinic via [facility] with Ambulatory Consult Sleep.     To do that please place an order for an  "Ambulatory Consult Sleep" -  order , it will go to our clinic work queue for our staff  to contact the patient " for an appointment.      For any questions, please contact our sleep lab  staff at [phone] to talk to clinical staff          Case Rondon MD

## 2025-01-23 NOTE — PROGRESS NOTES
The patient location is: [facility]  The chief complaint leading to consultation is:   Chief Complaint   Patient presents with    Apnea        Visit type: audiovisual    Face to Face time with patient: 14 min  45 minutes of total time spent on the encounter, which includes face to face time and non-face to face time preparing to see the patient (eg, review of tests), Obtaining and/or reviewing separately obtained history, Documenting clinical information in the electronic or other health record, Independently interpreting results (not separately reported) and communicating results to the patient/family/caregiver, or Care coordination (not separately reported).         Each patient to whom he or she provides medical services by telemedicine is:  (1) informed of the relationship between the physician and patient and the respective role of any other health care provider with respect to management of the patient; and (2) notified that he or she may decline to receive medical services by telemedicine and may withdraw from such care at any time.    Notes:                                               Pulmonary Outpatient  Visit     Subjective:       Patient ID: Brett Nugent is a 74 y.o. male.    Social History     Tobacco Use   Smoking Status Never   Smokeless Tobacco Never            Chief Complaint: Apnea          Brett Nugent is 74 y.o.  Referred by Leonardo Khan MD  [address]  [address]  [place] [address]   HSAT was +ve   MILD JOEY AHI 2.0/hr RDI 11, Supine AHI was 20/hr however says joseph[ine not his preferred sleep posture  Bed time: 9 pm  Wake time: 3:30 am  Snoring  Nocturia: for years, x 2-3  No DTS    Has seen urologist: medications suggested: Dr Dooley  Occupation: retired   No sleeping pills  Usually side sleeper      No interested in repeat test or trial of CPAP            Review of Systems   Genitourinary:         Nocturia       Outpatient Encounter Medications as of 1/25/2025    Medication Sig Dispense Refill    calcium carbonate (OS-JURGEN) 500 mg calcium (1,250 mg) tablet Take 1 tablet by mouth once daily.      cycloSPORINE (RESTASIS) 0.05 % ophthalmic emulsion Place 1 drop into both eyes 2 (two) times daily.      denosumab (PROLIA) 60 mg/mL Syrg Inject 60 mg into the skin.      esomeprazole (NEXIUM) 40 MG capsule Take 1 capsule (40 mg total) by mouth once daily. 90 capsule 3    HYDROcodone-acetaminophen (NORCO) 7.5-325 mg per tablet Take by mouth.      metoprolol succinate (TOPROL-XL) 50 MG 24 hr tablet Take 50 mg by mouth.      ondansetron (ZOFRAN-ODT) 4 MG TbDL Take 1 tablet (4 mg total) by mouth every 8 (eight) hours as needed (nausea/vomiting). 20 tablet 0    simvastatin (ZOCOR) 40 MG tablet Take 1 tablet (40 mg total) by mouth every evening. 90 tablet 3    vitamin D (VITAMIN D3) 1000 units Tab Take 5,000 Units by mouth once daily.        Facility-Administered Encounter Medications as of 1/25/2025   Medication Dose Route Frequency Provider Last Rate Last Admin    chlorhexidine 0.12 % solution 10 mL  10 mL Mouth/Throat On Call Procedure Kathy Elizabeth PA-C        lidocaine (PF) 10 mg/ml (1%) injection 10 mg  1 mL Intradermal Once Homer Johnson DO        nozaseptin (NOZIN) nasal    Each Nostril On Call Procedure Kathy Elizabeth PA-C   Given at 03/04/19 0826           Pertinent Work Up:      Pulmonary Interventions:      Smoking hx:    Environmental/Occupational hx:        Interval Hx:           The following portions of the patient's history were reviewed and updated as appropriate: He  has a past medical history of Anxiety, Enlarged prostate, General anesthetics causing adverse effect in therapeutic use, GERD (gastroesophageal reflux disease), [place] (hard of hearing), Hyperlipidemia, Osteoporosis, and White coat syndrome without diagnosis of hypertension.  He does not have any pertinent problems on file.  He  has a past surgical history that includes Sinus  surgery; Prostate surgery; Fusion of posterior column of cervical spine using computer aided navigation (Bilateral, 03/04/2019); Repair of cerebrospinal fluid leak of spine (Bilateral, 03/14/2019); Spine surgery; and Insertion of pacemaker (06/2024).  His family history is not on file.  He  reports that he has never smoked. He has never used smokeless tobacco. He reports that he does not drink alcohol and does not use drugs.  He has a current medication list which includes the following prescription(s): calcium carbonate, cyclosporine, prolia, esomeprazole, hydrocodone-acetaminophen, metoprolol succinate, ondansetron, simvastatin, and vitamin d, and the following Facility-Administered Medications: chlorhexidine, lidocaine (pf) 10 mg/ml (1%), and nozaseptin.  Current Outpatient Medications on File Prior to Visit   Medication Sig Dispense Refill    calcium carbonate (OS-JURGEN) 500 mg calcium (1,250 mg) tablet Take 1 tablet by mouth once daily.      cycloSPORINE (RESTASIS) 0.05 % ophthalmic emulsion Place 1 drop into both eyes 2 (two) times daily.      denosumab (PROLIA) 60 mg/mL Syrg Inject 60 mg into the skin.      esomeprazole (NEXIUM) 40 MG capsule Take 1 capsule (40 mg total) by mouth once daily. 90 capsule 3    HYDROcodone-acetaminophen (NORCO) 7.5-325 mg per tablet Take by mouth.      metoprolol succinate (TOPROL-XL) 50 MG 24 hr tablet Take 50 mg by mouth.      ondansetron (ZOFRAN-ODT) 4 MG TbDL Take 1 tablet (4 mg total) by mouth every 8 (eight) hours as needed (nausea/vomiting). 20 tablet 0    simvastatin (ZOCOR) 40 MG tablet Take 1 tablet (40 mg total) by mouth every evening. 90 tablet 3    vitamin D (VITAMIN D3) 1000 units Tab Take 5,000 Units by mouth once daily.        Current Facility-Administered Medications on File Prior to Visit   Medication Dose Route Frequency Provider Last Rate Last Admin    chlorhexidine 0.12 % solution 10 mL  10 mL Mouth/Throat On Call Procedure Kathy Elizabeth PA-C         lidocaine (PF) 10 mg/ml (1%) injection 10 mg  1 mL Intradermal Once Homer Johnson,         nozaseptin (NOZIN) nasal    Each Nostril On Call Procedure Kathy Elizabeth PA-C   Given at 03/04/19 0826     He is allergic to tetanus vaccines and toxoid..      BP Readings from Last 3 Encounters:   01/25/25 135/85   12/05/24 135/85   08/22/24 (!) 138/91     Snoring / Sleep:     Lyman Questionnaire (validated JOEY screening questionnaire)    YES -- Snoring/apnea    No -- Fatigue    Body mass index is 21.18 kg/m².  (>25 is overweight, >30 is obese)    Blood Pressure = normal blood pressure  (PreHTN 120-139/80-89, Stg1 140-159/90-99, Stg2 >160/>100)  Lyman = 1 of three JOEY categories are positive (high risk is 2-3 positive categories)     [place] Sleepiness Scale TOTAL =          1/25/2025   EPWORTH SLEEPINESS SCALE   Sitting and reading 1   Watching TV 1   Sitting, inactive in a public place (e.g. a theatre or a meeting) 0   As a passenger in a car for an hour without a break 3   Lying down to rest in the afternoon when circumstances permit 2   Sitting and talking to someone 0   Sitting quietly after a lunch without alcohol 0   In a car, while stopped for a few minutes in traffic 0   Total score 7      (validated sleepiness questionnaire with a higher score indicating greater sleepiness; range 0-24)  Failed to redirect to the Timeline version of the Mobile [employer] Consumer SmartLink.    STOP-Bang Questionnaire (validated JOEY screening questionnaire)  Negative unless checked off.  [x] Snoring    []  Tired/Fatigued/Sleepy  [] Obstruction (apneas/choking)  [] Pressure (HTN)  [] BMI >35  [x] Age >50  [] Neck >40 cm  [x] Gender male   STOP-Bang = 3 (low risk 0-2,high risk 3-8)         MMRC Dyspnea Scale (4 is worst)     [] MMRC 0: Dyspneic on strenuous excercise (0 points)    [] MMRC 1: Dyspneic on walking a slight hill (0 points)    [] MMRC 2: Dyspneic on walking level ground; must stop occasionally due to breathlessness (1  "point)    [] MMRC 3: Must stop for breathlessness after walking 100 yards or after a few minutes (2 points)    [] MMRC 4: Cannot leave house; breathless on dressing/undressing (3 points)                          No data to display                          Objective:     Vital Signs (Most Recent)  Vital Signs  BP: 135/85  Height and Weight  Height: 6' 2" (188 cm)  Weight: 74.8 kg (165 lb)  BSA (Calculated - sq m): 1.98 sq meters  BMI (Calculated): 21.2  Weight in (lb) to have BMI = 25: 194.3]  Wt Readings from Last 2 Encounters:   01/25/25 74.8 kg (165 lb)   12/05/24 74.4 kg (164 lb)       Physical Exam  Vitals and nursing note reviewed.   HENT:      Head: Normocephalic and atraumatic.   Eyes:      Pupils: Pupils are equal, round, and reactive to light.   Neurological:      General: No focal deficit present.      Mental Status: He is alert and oriented to person, place, and time.          Laboratory  Lab Results   Component Value Date    WBC 6.66 02/02/2023    RBC 4.74 02/02/2023    HGB 13.0 (L) 02/02/2023    HCT 43.0 02/02/2023    MCV 91 02/02/2023    MCH 27.4 02/02/2023    MCHC 30.2 (L) 02/02/2023    RDW 15.0 (H) 02/02/2023     02/02/2023    MPV 10.3 02/02/2023    GRAN 4.3 02/02/2023    GRAN 64.1 02/02/2023    LYMPH 1.6 02/02/2023    LYMPH 24.3 02/02/2023    MONO 0.6 02/02/2023    MONO 9.2 02/02/2023    EOS 0.1 02/02/2023    BASO 0.03 02/02/2023    EOSINOPHIL 1.4 02/02/2023    BASOPHIL 0.5 02/02/2023       BMP  Lab Results   Component Value Date     10/29/2024    K 4.0 10/29/2024     10/29/2024    CO2 27 10/29/2024    BUN 22 10/29/2024    CREATININE 1.0 10/29/2024    CALCIUM 9.1 10/29/2024    ANIONGAP 8 10/29/2024    ESTGFRAFRICA >60.0 07/20/2022    EGFRNONAA >60.0 07/20/2022    AST 21 10/29/2024    ALT 12 10/29/2024    PROT 6.8 10/29/2024          No results found for: "IGE"     No results found for: "ASPERGILLUS"  No results found for: "AFUMIGATUSCL"     No results found for: "ACE" "     Diagnostic Results:  I have personally reviewed today the following studies:    X-Ray Lumbar Spine AP And Lateral  Narrative: EXAM:  XR LUMBAR SPINE AP AND LATERAL    INDICATIONS: Osteoporosis    COMPARISONS: none    TECHNIQUE: 3 views of the lumbar spine    FINDINGS:      There are 5 nonrib-bearing lumbar type vertebral bodies present.  Spondylosis is prominent at L1-2, L2-3 and L3-4 with anterior osteophytes, significant disc height loss, facet hypertrophic changes and a resultant levo scoliosis primarily centered at L3.    There is a mild spondylolisthesis with L4 forward on L5.    Significant fecal burden is present throughout the entire visualized large bowel and extending into the rectal vault.  Impression: 1.  Severe degenerative lumbar spine changes with resultant levoscoliosis as described    2.  Significant total colonic fecal burden is present highly suggestive of clinical constipation    Finalized on: 2/2/2023 9:31 AM By:  Robert Robles MD  [facility]# [id number]      2023-02-02 09:33:43.925    [facility]  X-Ray Chest PA And Lateral  Narrative: EXAM:  XR CHEST PA AND LATERAL    CLINICAL HISTORY: Preoperative    COMPARISON: None available.    FINDINGS: No confluent airspace opacity or consolidation.  There is no evidence of pleural effusion, pneumothorax, or other acute pulmonary disease.  The cardiomediastinal silhouette is within normal limits.  No acute osseous abnormality is evident.      Mild constipation.  Scattered degenerative changes.  Impression:  No acute cardiopulmonary abnormality.    Finalized on: 2/2/2023 9:30 AM By:  Brett Whalen MD  [facility]# [id number]      2023-02-02 09:32:13.841    [facility]     PHYSICIAN INTERPRETATION AND COMMENTS: Findings are consistent with very mild, positional obstructive sleep  apnea(JOEY). In-lab polysomnography is indicated to validate diagnoses. Please refer to Sleep Disorder Clinic  CLINICAL HISTORY: 74 year old male. Stop bang 4. Has had sleep study in the past that did show  "sleep apnea but he could  not sleep well in the hospital setting, will try home study. BMI 21.06 kg/mB2  SLEEP STUDY FINDINGS: Patient underwent a 1 night Home Sleep Test and by behavioral criteria, slept for approximately  5.82 hours, with a sleep latency of 42 minutes and a sleep efficiency of 94%. The patient slept supine 10.7% of the night  based on valid recording time of 5.25 hours and is 9 times as likely to have apneas/hypopneas when supine. Snoring  occurs for 1.1% (30 dB) of the study. The mean pulse rate is 60.3 BPM.  TREATMENT CONSIDERATIONS: ENT evaluation, mandibular advancement device, trial of CPAP.      Dear Leonardo Khan MD  [address] [facility]  [address]  [place] [address]/Leonardo Khan MD         The sleep study that you ordered is complete.  You have ordered sleep LAB services to perform the sleep study for Brett Hagen .      Please find Sleep Study result in  the "Media tab" of Chart Review menu.        You can look  for the report in the  Media by the document type "Sleep Study Documents". Alphabetizing  "Document type" column helps to find the SLEEP STUDY report  Faster.       As the ordering provider, you are responsible for reviewing the results and implementing a treatment plan with your patient.    If you need a Sleep Medicine provider to explain the sleep study findings and arrange treatment for the patient, please refer patient for consultation to our Sleep Clinic via [facility] with Ambulatory Consult Sleep.     To do that please place an order for an  "Ambulatory Consult Sleep" -  order , it will go to our clinic work queue for our staff  to contact the patient for an appointment.      For any questions, please contact our sleep lab  staff at [phone] to talk to clinical staff      Patient Name BRETT HAGEN Study Ordered By Case Rondon  Date of Night 1 01/04/2025 07:55:00 AM Date of Birth 1950  Identification Number [id number]  Overall AHI (4%)* Overall " RDI % time < 90% Sp02 Mean Sp02 % time snoring > 30dB  2 6 0% 96.7% 1.1%  PHYSICIAN INTERPRETATION AND COMMENTS: Findings are consistent with very mild, positional obstructive sleep  apnea(JOEY). In-lab polysomnography is indicated to validate diagnoses. Please refer to Sleep Disorder Clinic  CLINICAL HISTORY: 74 year old male. Stop bang 4. Has had sleep study in the past that did show sleep apnea but he could  not sleep well in the hospital setting, will try home study. BMI 21.06 kg/mB2  SLEEP STUDY FINDINGS: Patient underwent a 1 night Home Sleep Test and by behavioral criteria, slept for approximately  5.82 hours, with a sleep latency of 42 minutes and a sleep efficiency of 94%. The patient slept supine 10.7% of the night  based on valid recording time of 5.25 hours and is 9 times as likely to have apneas/hypopneas when supine. Snoring  occurs for 1.1% (30 dB) of the study. The mean pulse rate is 60.3 BPM.  TREATMENT CONSIDERATIONS: ENT evaluation, mandibular advancement device, trial of CPAP.  DISEASE MANAGEMENT CONSIDERATIONS: None.  Assessment/Plan:          1. Nocturia associated with benign prostatic hyperplasia  Assessment & Plan:  Followup with Dr Dooley      2. JOEY (obstructive sleep apnea)  Assessment & Plan:  Very Mild JOEY  Discussed options: test may be true reflection or may underestimated    ENT eval  Dental medicine  Positional therapy: OCTAVIA Ramirez  Repeat testing    Orders:  -     Ambulatory referral/consult to Sleep Disorders  -     Ambulatory referral/consult to ENT; Future; Expected date: 02/01/2025  -     Ambulatory referral/consult to Dentistry; Future; Expected date: 02/01/2025      Followup with urology: based on this single test doubt JOEY contributor for his nocturia      Follow up in about 6 months (around 7/25/2025), or refe to ENT and Dental, consider repeat test if agreeable.    This note was prepared using voice recognition system and is likely to have sound alike errors that may have been  overlooked even after proof reading.  Please call me with any questions    Discussed diagnosis, its evaluation, treatment and usual course. All questions answered.    The patient was given open opportunity to ask questions and/or express concerns about treatment plan.   All questions/concerns were discussed.       Two patient identifiers used prior to evaluation.     Thank you for the courtesy of participating in the care of this patient    Case Rondon MD      Personal Diagnostic Review  []  CXR    []  ECHO    []  ONSAT    []  6MWD    []  LABS    []  CHEST CT    []  PET CT    []  Biopsy results

## 2025-01-25 ENCOUNTER — OFFICE VISIT (OUTPATIENT)
Dept: PULMONOLOGY | Facility: CLINIC | Age: 75
End: 2025-01-25
Payer: MEDICARE

## 2025-01-25 VITALS
BODY MASS INDEX: 21.17 KG/M2 | HEIGHT: 74 IN | DIASTOLIC BLOOD PRESSURE: 85 MMHG | WEIGHT: 165 LBS | SYSTOLIC BLOOD PRESSURE: 135 MMHG

## 2025-01-25 DIAGNOSIS — N40.1 NOCTURIA ASSOCIATED WITH BENIGN PROSTATIC HYPERPLASIA: Primary | ICD-10-CM

## 2025-01-25 DIAGNOSIS — R35.1 NOCTURIA ASSOCIATED WITH BENIGN PROSTATIC HYPERPLASIA: Primary | ICD-10-CM

## 2025-01-25 DIAGNOSIS — G47.33 OSA (OBSTRUCTIVE SLEEP APNEA): ICD-10-CM

## 2025-01-25 PROCEDURE — 3288F FALL RISK ASSESSMENT DOCD: CPT | Mod: CPTII,95,, | Performed by: INTERNAL MEDICINE

## 2025-01-25 PROCEDURE — 98001 SYNCH AUDIO-VIDEO NEW LOW 30: CPT | Mod: 95,,, | Performed by: INTERNAL MEDICINE

## 2025-01-25 PROCEDURE — 1159F MED LIST DOCD IN RCRD: CPT | Mod: CPTII,95,, | Performed by: INTERNAL MEDICINE

## 2025-01-25 PROCEDURE — 1157F ADVNC CARE PLAN IN RCRD: CPT | Mod: CPTII,95,, | Performed by: INTERNAL MEDICINE

## 2025-01-25 PROCEDURE — 1160F RVW MEDS BY RX/DR IN RCRD: CPT | Mod: CPTII,95,, | Performed by: INTERNAL MEDICINE

## 2025-01-25 PROCEDURE — 1101F PT FALLS ASSESS-DOCD LE1/YR: CPT | Mod: CPTII,95,, | Performed by: INTERNAL MEDICINE

## 2025-01-25 PROCEDURE — 3075F SYST BP GE 130 - 139MM HG: CPT | Mod: CPTII,95,, | Performed by: INTERNAL MEDICINE

## 2025-01-25 PROCEDURE — 3079F DIAST BP 80-89 MM HG: CPT | Mod: CPTII,95,, | Performed by: INTERNAL MEDICINE

## 2025-01-25 NOTE — ASSESSMENT & PLAN NOTE
Very Mild JOEY  Discussed options: test may be true reflection or may underestimated    ENT eval  Dental medicine  Positional therapy: Z Zoma  Repeat testing

## 2025-01-25 NOTE — LETTER
January 25, 2025        Arnold Dooley MD  [address], [address]  [place] [address]             [facility] [address]  [address] [facility] [place] [address]  Phone: [phone]  Fax: [phone]   Patient: Brett Nugent   MR Number: [id number]   YOB: 1950   Date of Visit: 1/25/2025       Dear Dr. Dooley:    Thank you for referring Brett Nugent to me for evaluation. Attached you will find relevant portions of my assessment and plan of care.    If you have questions, please do not hesitate to call me. I look forward to following Brett Nugent along with you.    Sincerely,      Case Rondon MD            CC  No Recipients    Enclosure

## 2025-04-22 ENCOUNTER — APPOINTMENT (OUTPATIENT)
Dept: RADIOLOGY | Facility: HOSPITAL | Age: 75
End: 2025-04-22
Attending: FAMILY MEDICINE
Payer: MEDICARE

## 2025-04-22 DIAGNOSIS — R42 DIZZINESS: ICD-10-CM

## 2025-04-22 DIAGNOSIS — R55 NEAR SYNCOPE: ICD-10-CM

## 2025-04-22 PROCEDURE — 93880 EXTRACRANIAL BILAT STUDY: CPT | Mod: TC,PO

## 2025-04-22 PROCEDURE — 93880 EXTRACRANIAL BILAT STUDY: CPT | Mod: 26,,, | Performed by: RADIOLOGY

## 2025-05-06 ENCOUNTER — APPOINTMENT (OUTPATIENT)
Dept: RADIOLOGY | Facility: HOSPITAL | Age: 75
End: 2025-05-06
Attending: INTERNAL MEDICINE
Payer: MEDICARE

## 2025-05-06 DIAGNOSIS — M81.0 AGE-RELATED OSTEOPOROSIS WITHOUT CURRENT PATHOLOGICAL FRACTURE: ICD-10-CM

## 2025-05-06 PROCEDURE — 77080 DXA BONE DENSITY AXIAL: CPT | Mod: TC

## 2025-05-06 PROCEDURE — 77080 DXA BONE DENSITY AXIAL: CPT | Mod: 26,,, | Performed by: STUDENT IN AN ORGANIZED HEALTH CARE EDUCATION/TRAINING PROGRAM

## 2025-05-07 ENCOUNTER — RESULTS FOLLOW-UP (OUTPATIENT)
Dept: RHEUMATOLOGY | Facility: CLINIC | Age: 75
End: 2025-05-07

## 2025-05-07 ENCOUNTER — LAB VISIT (OUTPATIENT)
Dept: LAB | Facility: HOSPITAL | Age: 75
End: 2025-05-07
Attending: FAMILY MEDICINE
Payer: MEDICARE

## 2025-05-07 DIAGNOSIS — Z51.81 MEDICATION MONITORING ENCOUNTER: ICD-10-CM

## 2025-05-07 LAB
ALBUMIN SERPL BCP-MCNC: 3.7 G/DL (ref 3.5–5.2)
ALP SERPL-CCNC: 48 UNIT/L (ref 40–150)
ALT SERPL W/O P-5'-P-CCNC: 11 UNIT/L (ref 10–44)
ANION GAP (OHS): 7 MMOL/L (ref 8–16)
AST SERPL-CCNC: 20 UNIT/L (ref 11–45)
BILIRUB SERPL-MCNC: 0.3 MG/DL (ref 0.1–1)
BUN SERPL-MCNC: 22 MG/DL (ref 8–23)
CALCIUM SERPL-MCNC: 8.8 MG/DL (ref 8.7–10.5)
CHLORIDE SERPL-SCNC: 110 MMOL/L (ref 95–110)
CO2 SERPL-SCNC: 25 MMOL/L (ref 23–29)
CREAT SERPL-MCNC: 1.1 MG/DL (ref 0.5–1.4)
GFR SERPLBLD CREATININE-BSD FMLA CKD-EPI: >60 ML/MIN/1.73/M2
GLUCOSE SERPL-MCNC: 105 MG/DL (ref 70–110)
POTASSIUM SERPL-SCNC: 4.3 MMOL/L (ref 3.5–5.1)
PROT SERPL-MCNC: 6.9 GM/DL (ref 6–8.4)
SODIUM SERPL-SCNC: 142 MMOL/L (ref 136–145)

## 2025-05-07 PROCEDURE — 36415 COLL VENOUS BLD VENIPUNCTURE: CPT | Mod: PO

## 2025-05-07 PROCEDURE — 80053 COMPREHEN METABOLIC PANEL: CPT

## 2025-05-14 ENCOUNTER — INFUSION (OUTPATIENT)
Dept: INFUSION THERAPY | Facility: HOSPITAL | Age: 75
End: 2025-05-14
Attending: INTERNAL MEDICINE
Payer: MEDICARE

## 2025-05-14 ENCOUNTER — OFFICE VISIT (OUTPATIENT)
Dept: RHEUMATOLOGY | Facility: CLINIC | Age: 75
End: 2025-05-14
Payer: MEDICARE

## 2025-05-14 VITALS
SYSTOLIC BLOOD PRESSURE: 137 MMHG | HEART RATE: 86 BPM | RESPIRATION RATE: 18 BRPM | HEIGHT: 74 IN | TEMPERATURE: 98 F | DIASTOLIC BLOOD PRESSURE: 88 MMHG | WEIGHT: 165.13 LBS | BODY MASS INDEX: 21.19 KG/M2

## 2025-05-14 DIAGNOSIS — Z95.0 PACEMAKER: ICD-10-CM

## 2025-05-14 DIAGNOSIS — M81.0 AGE-RELATED OSTEOPOROSIS WITHOUT CURRENT PATHOLOGICAL FRACTURE: Primary | ICD-10-CM

## 2025-05-14 DIAGNOSIS — Z71.89 COUNSELING ON HEALTH PROMOTION AND DISEASE PREVENTION: ICD-10-CM

## 2025-05-14 DIAGNOSIS — E55.9 VITAMIN D INSUFFICIENCY: ICD-10-CM

## 2025-05-14 DIAGNOSIS — Z51.81 MEDICATION MONITORING ENCOUNTER: ICD-10-CM

## 2025-05-14 PROCEDURE — 99214 OFFICE O/P EST MOD 30 MIN: CPT | Mod: S$GLB,,, | Performed by: INTERNAL MEDICINE

## 2025-05-14 PROCEDURE — 1157F ADVNC CARE PLAN IN RCRD: CPT | Mod: CPTII,S$GLB,, | Performed by: INTERNAL MEDICINE

## 2025-05-14 PROCEDURE — 1159F MED LIST DOCD IN RCRD: CPT | Mod: CPTII,S$GLB,, | Performed by: INTERNAL MEDICINE

## 2025-05-14 PROCEDURE — 99999 PR PBB SHADOW E&M-EST. PATIENT-LVL III: CPT | Mod: PBBFAC,,, | Performed by: INTERNAL MEDICINE

## 2025-05-14 PROCEDURE — 3288F FALL RISK ASSESSMENT DOCD: CPT | Mod: CPTII,S$GLB,, | Performed by: INTERNAL MEDICINE

## 2025-05-14 PROCEDURE — 63600175 PHARM REV CODE 636 W HCPCS: Mod: JZ,TB | Performed by: INTERNAL MEDICINE

## 2025-05-14 PROCEDURE — 1126F AMNT PAIN NOTED NONE PRSNT: CPT | Mod: CPTII,S$GLB,, | Performed by: INTERNAL MEDICINE

## 2025-05-14 PROCEDURE — 3079F DIAST BP 80-89 MM HG: CPT | Mod: CPTII,S$GLB,, | Performed by: INTERNAL MEDICINE

## 2025-05-14 PROCEDURE — 3075F SYST BP GE 130 - 139MM HG: CPT | Mod: CPTII,S$GLB,, | Performed by: INTERNAL MEDICINE

## 2025-05-14 PROCEDURE — G2211 COMPLEX E/M VISIT ADD ON: HCPCS | Mod: S$GLB,,, | Performed by: INTERNAL MEDICINE

## 2025-05-14 PROCEDURE — 1101F PT FALLS ASSESS-DOCD LE1/YR: CPT | Mod: CPTII,S$GLB,, | Performed by: INTERNAL MEDICINE

## 2025-05-14 PROCEDURE — 96372 THER/PROPH/DIAG INJ SC/IM: CPT

## 2025-05-14 RX ADMIN — DENOSUMAB 60 MG: 60 INJECTION SUBCUTANEOUS at 09:05

## 2025-05-14 NOTE — DISCHARGE INSTRUCTIONS
[facility]  [address]  [address]  [phone] phone     [phone] fax  Hours of Operation: Monday- Friday 8:00am- 5:00pm  After hours phone  [phone]  Hematology / Oncology Physicians on call      SOURAV Coley Dr., NP Phaon Dunbar, NP Khelsea Conley, FNP    Please call with any concerns regarding your appointment today.

## 2025-05-14 NOTE — NURSING
Injection given without difficulties.Bandaid applied. Patient instructed to stay in the clinic for 15 minutes. Patient refused wait time and instructed to report to the emergency room with any problems breathing, hives, or reaction to medication given. Patient verbalized understanding.

## 2025-05-14 NOTE — PROGRESS NOTES
RHEUMATOLOGY OUTPATIENT CLINIC NOTE    5/14/2025    Attending Rheumatologist: Nicholas Brunson  Primary Care Provider/Physician Requesting Consultation: Leonardo Khan MD   Chief Complaint/Reason For Consultation:  Osteoporosis and DDD      Subjective:     Brett Nugent is a 74 y.o. White male with OP    Interval completion of invasive dental w/u.  Denies upcoming procedures of this kind.  No fractures since last visit.    Review of Systems   Constitutional:  Negative for fever.   Genitourinary:  Negative for dysuria, hematuria and urgency.   Musculoskeletal:  Negative for back pain, falls and joint pain.   Skin:  Negative for rash.   Neurological:  Negative for focal weakness.       Chronic comorbid conditions affecting medical decision making today:  Past Medical History:   Diagnosis Date    Anxiety     Enlarged prostate     General anesthetics causing adverse effect in therapeutic use     Slow to wake    GERD (gastroesophageal reflux disease)     [place] (hard of hearing)     has hearing aids    Hyperlipidemia     Osteoporosis     White coat syndrome without diagnosis of hypertension      Past Surgical History:   Procedure Laterality Date    FUSION OF POSTERIOR COLUMN OF CERVICAL SPINE USING COMPUTER AIDED NAVIGATION Bilateral 03/04/2019    Procedure: FUSION, SPINE, POSTERIOR SPINAL COLUMN, CERVICAL, USING COMPUTER-ASSISTED NAVIGATION;  Surgeon: Helio Wolfe MD;  Location: [facility] OR;  Service: Neurosurgery;  Laterality: Bilateral;  C3-5    INSERTION OF PACEMAKER  06/2024    PROSTATE SURGERY      REPAIR OF CEREBROSPINAL FLUID LEAK OF SPINE Bilateral 03/14/2019    Procedure: REPAIR, CSF LEAK, SPINE;  Surgeon: Helio Wolfe MD;  Location: [facility] OR;  Service: Neurosurgery;  Laterality: Bilateral;    SINUS SURGERY      SPINE SURGERY       No family history on file.  Tobacco Use History[1]  Current Medications[2]     Objective:     Vitals:    05/14/25 0858   BP: 137/88   Pulse: 86     Physical Exam   Eyes:  Conjunctivae are normal.   Pulmonary/Chest: Effort normal. No respiratory distress.   Musculoskeletal:         General: No swelling or tenderness. Normal range of motion.   Skin: No rash noted.       Reviewed available old and all outside pertinent medical records available.    All lab results personally reviewed and interpreted by me.       ASSESSMENT / PLAN     1. Age-related osteoporosis without current pathological fracture  S/p 2y course of Forteo, back on Prolia.  DXA w/ statistically significant improvement.  Plan to c/ Prolia q6m  Will update DXA in 2y      2. Medication monitoring encounter  Comprehensive Metabolic Panel      3. Vitamin D insufficiency  Vitamin D level  Ca/vit D supp /400mg BID      4. Pacemaker  Increased CV risk w/ Evenity      5. Counseling on health promotion and disease prevention  Weight bearing and muscle strengthening exercise              Visit today included increased complexity associated with the care of the episodic problem Age-related osteoporosis without current pathological fracture addressed and managing the longitudinal care of the patient due to the serious and/or complex managed problem(s) Medication monitoring encounter, Counseling on health promotion and disease prevention.    Nicholas Brunson M.D.           [1]   Social History  Tobacco Use   Smoking Status Never   Smokeless Tobacco Never   [2]   Current Outpatient Medications:     calcium carbonate (OS-JURGEN) 500 mg calcium (1,250 mg) tablet, Take 1 tablet by mouth once daily., Disp: , Rfl:     cycloSPORINE (RESTASIS) 0.05 % ophthalmic emulsion, Place 1 drop into both eyes 2 (two) times daily., Disp: , Rfl:     denosumab (PROLIA) 60 mg/mL Syrg, Inject 60 mg into the skin., Disp: , Rfl:     esomeprazole (NEXIUM) 40 MG capsule, Take 1 capsule (40 mg total) by mouth once daily., Disp: 90 capsule, Rfl: 3    HYDROcodone-acetaminophen (NORCO) 7.5-325 mg per tablet, Take by mouth., Disp: , Rfl:     metoprolol succinate  (TOPROL-XL) 50 MG 24 hr tablet, Take 50 mg by mouth., Disp: , Rfl:     ondansetron (ZOFRAN-ODT) 4 MG TbDL, Take 1 tablet (4 mg total) by mouth every 8 (eight) hours as needed (nausea/vomiting)., Disp: 20 tablet, Rfl: 0    simvastatin (ZOCOR) 40 MG tablet, Take 1 tablet (40 mg total) by mouth every evening., Disp: 90 tablet, Rfl: 3    vitamin D (VITAMIN D3) 1000 units Tab, Take 5,000 Units by mouth once daily. , Disp: , Rfl:   No current facility-administered medications for this visit.    Facility-Administered Medications Ordered in Other Visits:     chlorhexidine 0.12 % solution 10 mL, 10 mL, Mouth/Throat, On Call Procedure, Kathy Elizabeth PA-C    lidocaine (PF) 10 mg/ml (1%) injection 10 mg, 1 mL, Intradermal, Once, Homer Johnson DO    nozaseptin (NOZIN) nasal , , Each Nostril, On Call Procedure, Kathy Elizabeth PA-C, Given at 03/04/19 0832

## 2025-06-09 PROBLEM — E78.49 OTHER HYPERLIPIDEMIA: Status: ACTIVE | Noted: 2025-06-09

## (undated) DEVICE — SEE MEDLINE ITEM 157027

## (undated) DEVICE — SUT 1671H ETHILON 3-0

## (undated) DEVICE — SEE MEDLINE ITEM 152622

## (undated) DEVICE — TUBE SPEC COLL&TRANSPORT 11ML

## (undated) DEVICE — KIT SPINAL PATIENT CARE JACK

## (undated) DEVICE — BUR LEGEND MIDAS REX 14CM 13MM

## (undated) DEVICE — GAUZE SPONGE 4X4 12PLY

## (undated) DEVICE — COVER OVERHEAD SURG LT BLUE

## (undated) DEVICE — NDL SAFETY 22G X 1.5 ECLIPSE

## (undated) DEVICE — SYR ONLY LUER LOCK 20CC

## (undated) DEVICE — SUT PROLENE 6-0 BV-1

## (undated) DEVICE — ADHESIVE DERMABOND ADVANCED

## (undated) DEVICE — MANIFOLD 4 PORT

## (undated) DEVICE — LOTION DURA PREP REMOVER 40Z

## (undated) DEVICE — Device

## (undated) DEVICE — SEE MEDLINE ITEM 152739

## (undated) DEVICE — BLADE CLIPPER NEURO / MDL 4411

## (undated) DEVICE — INSERT CUSHIONPRONE VIEW LARGE

## (undated) DEVICE — ALCOHOL 70% ISOP RUBBING 4OZ

## (undated) DEVICE — ADHESIVE MASTISOL VIAL 48/BX

## (undated) DEVICE — SYS DURASEAL SPINE

## (undated) DEVICE — STAPLER SKIN PROXIMATE WIDE

## (undated) DEVICE — CORD BIPOLAR ELECTROSURGICAL

## (undated) DEVICE — DURAPREP SURG SCRUB 26ML

## (undated) DEVICE — ELECTRODE REM PLYHSV RETURN 9

## (undated) DEVICE — SUT VICRYL PLUS 0 CT1 18IN

## (undated) DEVICE — GLOVE 8 PROTEXIS PI BLUE

## (undated) DEVICE — SPONGE PATTY SURGICAL .5X3IN

## (undated) DEVICE — DRAPE STERI INSTRUMENT 1018

## (undated) DEVICE — DRESSING AQUACEL AG 3.5X10IN

## (undated) DEVICE — DRESSING SURGICAL 1/2X1/2

## (undated) DEVICE — GLOVE 6.5 PROTEXIS PI BLUE

## (undated) DEVICE — GLOVE PROTEXIS HYDROGEL SZ6.5

## (undated) DEVICE — SUT ETHILON 3-0 PSL 30 BLK

## (undated) DEVICE — SUT VICRYL PLUS 3-0 SH 18IN

## (undated) DEVICE — SEE MEDLINE ITEM 157117

## (undated) DEVICE — SUT VICRYL+ 2-0 SH 18IN

## (undated) DEVICE — DRESSING ADH SQUARE 8X8CM

## (undated) DEVICE — SWAB CULTURETTE II DUAL

## (undated) DEVICE — SYR 10CC LUER LOCK

## (undated) DEVICE — GLOVE PROTEXIS LTX MICRO 8

## (undated) DEVICE — DURAPREP SURG SCRUB 6ML

## (undated) DEVICE — DRAPE INCISE IOBAN 2 23X17IN

## (undated) DEVICE — SUT 1 8-18IN VICRYL PLUS CT

## (undated) DEVICE — KIT SURGIFLO HEMOSTATIC MATRIX

## (undated) DEVICE — DRAPE MOBILE C-ARM

## (undated) DEVICE — DRESSING AQUACEL AG ADV 3.5X6

## (undated) DEVICE — SEE MEDLINE ITEM 146292

## (undated) DEVICE — KIT EVACUATOR 3-SPRING 1/8 DRN

## (undated) DEVICE — SKIN MARKER DEVON 160

## (undated) DEVICE — SYR B-D DISP CONTROL 10CC100/C

## (undated) DEVICE — PINS SKULL ADULT MAYFIELD
Type: IMPLANTABLE DEVICE | Site: SCALP | Status: NON-FUNCTIONAL
Removed: 2019-03-04

## (undated) DEVICE — NDL SPINAL 20GX3.5 HUB

## (undated) DEVICE — DRAPE SURG W/TWL 17 5/8X23

## (undated) DEVICE — WARMER DRAPE STERILE LF

## (undated) DEVICE — VISTA COLLAR

## (undated) DEVICE — SPHERE NDI PASSIVE

## (undated) DEVICE — DRESSING SURGICAL 1X1

## (undated) DEVICE — SEE MEDLINE ITEM 157194

## (undated) DEVICE — GAUZE SPONGE PEANUT STRL

## (undated) DEVICE — SEE MEDLINE ITEM 157131

## (undated) DEVICE — BLADE EZ CLEAN 2.5IN MODIFIED

## (undated) DEVICE — NDL SPINAL SPINOCAN 22GX3.5

## (undated) DEVICE — DRAPE C-ARMOR EQUIPMENT COVER

## (undated) DEVICE — SEE MEDLINE ITEM 154981

## (undated) DEVICE — GLOVE 8.0 PROTEXIS PI MICRO

## (undated) DEVICE — SEE MEDLINE ITEM 157148